# Patient Record
Sex: FEMALE | Race: BLACK OR AFRICAN AMERICAN | NOT HISPANIC OR LATINO | Employment: OTHER | URBAN - METROPOLITAN AREA
[De-identification: names, ages, dates, MRNs, and addresses within clinical notes are randomized per-mention and may not be internally consistent; named-entity substitution may affect disease eponyms.]

---

## 2017-03-02 ENCOUNTER — HISTORICAL (OUTPATIENT)
Dept: ADMINISTRATIVE | Facility: HOSPITAL | Age: 77
End: 2017-03-02

## 2017-08-07 ENCOUNTER — HISTORICAL (OUTPATIENT)
Dept: ADMINISTRATIVE | Facility: HOSPITAL | Age: 77
End: 2017-08-07

## 2017-08-07 LAB
ABS NEUT (OLG): 3.08 X10(3)/MCL (ref 2.1–9.2)
ALBUMIN SERPL-MCNC: 4 GM/DL (ref 3.4–5)
ALBUMIN/GLOB SERPL: 1 RATIO (ref 1–2)
ALP SERPL-CCNC: 96 UNIT/L (ref 45–117)
ALT SERPL-CCNC: 16 UNIT/L (ref 12–78)
AST SERPL-CCNC: 15 UNIT/L (ref 15–37)
BASOPHILS # BLD AUTO: 0.06 X10(3)/MCL
BASOPHILS NFR BLD AUTO: 1 % (ref 0–1)
BILIRUB SERPL-MCNC: 0.8 MG/DL (ref 0.2–1)
BILIRUBIN DIRECT+TOT PNL SERPL-MCNC: 0.2 MG/DL
BILIRUBIN DIRECT+TOT PNL SERPL-MCNC: 0.6 MG/DL
BUN SERPL-MCNC: 18 MG/DL (ref 7–18)
CALCIUM SERPL-MCNC: 9.6 MG/DL (ref 8.5–10.1)
CHLORIDE SERPL-SCNC: 106 MMOL/L (ref 98–107)
CO2 SERPL-SCNC: 27 MMOL/L (ref 21–32)
CREAT SERPL-MCNC: 1.1 MG/DL (ref 0.6–1.3)
EOSINOPHIL # BLD AUTO: 0.15 X10(3)/MCL
EOSINOPHIL NFR BLD AUTO: 3 % (ref 0–5)
ERYTHROCYTE [DISTWIDTH] IN BLOOD BY AUTOMATED COUNT: 11.8 % (ref 11.5–14.5)
GLOBULIN SER-MCNC: 4.3 GM/ML (ref 2.3–3.5)
GLUCOSE SERPL-MCNC: 129 MG/DL (ref 74–106)
HBV SURFACE AG SERPL QL IA: POSITIVE
HCT VFR BLD AUTO: 38.5 % (ref 35–46)
HGB BLD-MCNC: 12.6 GM/DL (ref 12–16)
IMM GRANULOCYTES # BLD AUTO: 0.02 10*3/UL
IMM GRANULOCYTES NFR BLD AUTO: 0 %
LYMPHOCYTES # BLD AUTO: 1.8 X10(3)/MCL
LYMPHOCYTES NFR BLD AUTO: 32 % (ref 15–40)
MCH RBC QN AUTO: 28.9 PG (ref 26–34)
MCHC RBC AUTO-ENTMCNC: 32.7 GM/DL (ref 31–37)
MCV RBC AUTO: 88.3 FL (ref 80–100)
MONOCYTES # BLD AUTO: 0.47 X10(3)/MCL
MONOCYTES NFR BLD AUTO: 8 % (ref 4–12)
NEUTROPHILS # BLD AUTO: 3.08 X10(3)/MCL
NEUTROPHILS NFR BLD AUTO: 55 X10(3)/MCL
PLATELET # BLD AUTO: 175 X10(3)/MCL (ref 130–400)
PMV BLD AUTO: 13.5 FL (ref 7.4–10.4)
POTASSIUM SERPL-SCNC: 4.5 MMOL/L (ref 3.5–5.1)
PROT SERPL-MCNC: 8.3 GM/DL (ref 6.4–8.2)
RBC # BLD AUTO: 4.36 X10(6)/MCL (ref 4–5.2)
SODIUM SERPL-SCNC: 141 MMOL/L (ref 136–145)
WBC # SPEC AUTO: 5.6 X10(3)/MCL (ref 4.5–11)

## 2017-09-18 ENCOUNTER — HISTORICAL (OUTPATIENT)
Dept: RADIOLOGY | Facility: HOSPITAL | Age: 77
End: 2017-09-18

## 2018-01-31 ENCOUNTER — HISTORICAL (OUTPATIENT)
Dept: INTERNAL MEDICINE | Facility: CLINIC | Age: 78
End: 2018-01-31

## 2018-02-09 LAB
COLOR STL: NORMAL
CONSISTENCY STL: NORMAL
HEMOCCULT SP1 STL QL: NEGATIVE

## 2018-02-11 LAB
COLOR STL: NORMAL
CONSISTENCY STL: NORMAL
HEMOCCULT SP2 STL QL: NEGATIVE

## 2018-02-12 ENCOUNTER — HISTORICAL (OUTPATIENT)
Dept: INTERNAL MEDICINE | Facility: CLINIC | Age: 78
End: 2018-02-12

## 2018-02-12 ENCOUNTER — HISTORICAL (OUTPATIENT)
Dept: ADMINISTRATIVE | Facility: HOSPITAL | Age: 78
End: 2018-02-12

## 2018-02-12 LAB
ABS NEUT (OLG): 3.15 X10(3)/MCL (ref 2.1–9.2)
ALBUMIN SERPL-MCNC: 3.8 GM/DL (ref 3.4–5)
ALBUMIN/GLOB SERPL: 1 RATIO (ref 1–2)
ALP SERPL-CCNC: 89 UNIT/L (ref 45–117)
ALT SERPL-CCNC: 14 UNIT/L (ref 12–78)
AST SERPL-CCNC: 16 UNIT/L (ref 15–37)
BASOPHILS # BLD AUTO: 0.04 X10(3)/MCL
BASOPHILS NFR BLD AUTO: 1 % (ref 0–1)
BILIRUB SERPL-MCNC: 0.8 MG/DL (ref 0.2–1)
BILIRUBIN DIRECT+TOT PNL SERPL-MCNC: 0.2 MG/DL
BILIRUBIN DIRECT+TOT PNL SERPL-MCNC: 0.6 MG/DL
BUN SERPL-MCNC: 17 MG/DL (ref 7–18)
CALCIUM SERPL-MCNC: 9 MG/DL (ref 8.5–10.1)
CHLORIDE SERPL-SCNC: 107 MMOL/L (ref 98–107)
CO2 SERPL-SCNC: 28 MMOL/L (ref 21–32)
COLOR STL: NORMAL
CONSISTENCY STL: NORMAL
CREAT SERPL-MCNC: 1 MG/DL (ref 0.6–1.3)
EOSINOPHIL # BLD AUTO: 0.17 X10(3)/MCL
EOSINOPHIL NFR BLD AUTO: 3 % (ref 0–5)
ERYTHROCYTE [DISTWIDTH] IN BLOOD BY AUTOMATED COUNT: 12 % (ref 11.5–14.5)
GLOBULIN SER-MCNC: 4.4 GM/ML (ref 2.3–3.5)
GLUCOSE SERPL-MCNC: 129 MG/DL (ref 74–106)
HBV SURFACE AG SERPL QL IA: POSITIVE
HCT VFR BLD AUTO: 37.6 % (ref 35–46)
HGB BLD-MCNC: 12.7 GM/DL (ref 12–16)
IMM GRANULOCYTES # BLD AUTO: 0.01 10*3/UL
IMM GRANULOCYTES NFR BLD AUTO: 0 %
LYMPHOCYTES # BLD AUTO: 1.4 X10(3)/MCL
LYMPHOCYTES NFR BLD AUTO: 27 % (ref 15–40)
MCH RBC QN AUTO: 30.2 PG (ref 26–34)
MCHC RBC AUTO-ENTMCNC: 33.8 GM/DL (ref 31–37)
MCV RBC AUTO: 89.5 FL (ref 80–100)
MONOCYTES # BLD AUTO: 0.41 X10(3)/MCL
MONOCYTES NFR BLD AUTO: 8 % (ref 4–12)
NEUTROPHILS # BLD AUTO: 3.15 X10(3)/MCL
NEUTROPHILS NFR BLD AUTO: 61 X10(3)/MCL
PLATELET # BLD AUTO: 150 X10(3)/MCL (ref 130–400)
PMV BLD AUTO: 13 FL (ref 7.4–10.4)
POTASSIUM SERPL-SCNC: 4.3 MMOL/L (ref 3.5–5.1)
PROT SERPL-MCNC: 8.2 GM/DL (ref 6.4–8.2)
RBC # BLD AUTO: 4.2 X10(6)/MCL (ref 4–5.2)
SODIUM SERPL-SCNC: 141 MMOL/L (ref 136–145)
WBC # SPEC AUTO: 5.2 X10(3)/MCL (ref 4.5–11)

## 2018-03-29 ENCOUNTER — HISTORICAL (OUTPATIENT)
Dept: ADMINISTRATIVE | Facility: HOSPITAL | Age: 78
End: 2018-03-29

## 2018-08-13 ENCOUNTER — HISTORICAL (OUTPATIENT)
Dept: ADMINISTRATIVE | Facility: HOSPITAL | Age: 78
End: 2018-08-13

## 2018-08-13 LAB
ABS NEUT (OLG): 3.53 X10(3)/MCL (ref 2.1–9.2)
ALBUMIN SERPL-MCNC: 3.7 GM/DL (ref 3.4–5)
ALBUMIN/GLOB SERPL: 1 RATIO (ref 1–2)
ALP SERPL-CCNC: 95 UNIT/L (ref 45–117)
ALT SERPL-CCNC: 15 UNIT/L (ref 12–78)
AST SERPL-CCNC: 17 UNIT/L (ref 15–37)
BASOPHILS # BLD AUTO: 0.03 X10(3)/MCL
BASOPHILS NFR BLD AUTO: 0 %
BILIRUB SERPL-MCNC: 0.6 MG/DL (ref 0.2–1)
BILIRUBIN DIRECT+TOT PNL SERPL-MCNC: 0.2 MG/DL
BILIRUBIN DIRECT+TOT PNL SERPL-MCNC: 0.4 MG/DL
BUN SERPL-MCNC: 13 MG/DL (ref 7–18)
CALCIUM SERPL-MCNC: 8.7 MG/DL (ref 8.5–10.1)
CHLORIDE SERPL-SCNC: 106 MMOL/L (ref 98–107)
CO2 SERPL-SCNC: 27 MMOL/L (ref 21–32)
CREAT SERPL-MCNC: 1.2 MG/DL (ref 0.6–1.3)
EOSINOPHIL # BLD AUTO: 0.15 X10(3)/MCL
EOSINOPHIL NFR BLD AUTO: 3 %
ERYTHROCYTE [DISTWIDTH] IN BLOOD BY AUTOMATED COUNT: 11.8 % (ref 11.5–14.5)
GLOBULIN SER-MCNC: 3.8 GM/ML (ref 2.3–3.5)
GLUCOSE SERPL-MCNC: 183 MG/DL (ref 74–106)
HBV SURFACE AG SERPL QL IA: POSITIVE
HCT VFR BLD AUTO: 36.7 % (ref 35–46)
HCV AB SERPL QL IA: NONREACTIVE
HGB BLD-MCNC: 12 GM/DL (ref 12–16)
HIV 1+2 AB+HIV1 P24 AG SERPL QL IA: NONREACTIVE
IMM GRANULOCYTES # BLD AUTO: 0.02 10*3/UL
IMM GRANULOCYTES NFR BLD AUTO: 0 %
INR PPP: 1.19 (ref 0.9–1.2)
LYMPHOCYTES # BLD AUTO: 1.45 X10(3)/MCL
LYMPHOCYTES NFR BLD AUTO: 26 % (ref 13–40)
MCH RBC QN AUTO: 29.6 PG (ref 26–34)
MCHC RBC AUTO-ENTMCNC: 32.7 GM/DL (ref 31–37)
MCV RBC AUTO: 90.6 FL (ref 80–100)
MONOCYTES # BLD AUTO: 0.44 X10(3)/MCL
MONOCYTES NFR BLD AUTO: 8 % (ref 4–12)
NEUTROPHILS # BLD AUTO: 3.53 X10(3)/MCL
NEUTROPHILS NFR BLD AUTO: 63 X10(3)/MCL
PLATELET # BLD AUTO: 159 X10(3)/MCL (ref 130–400)
PMV BLD AUTO: 13.2 FL (ref 7.4–10.4)
POTASSIUM SERPL-SCNC: 4.2 MMOL/L (ref 3.5–5.1)
PROT SERPL-MCNC: 7.5 GM/DL (ref 6.4–8.2)
PROTHROMBIN TIME: 14.3 SECOND(S) (ref 11.9–14.4)
RBC # BLD AUTO: 4.05 X10(6)/MCL (ref 4–5.2)
RPR SER QL: REACTIVE
SODIUM SERPL-SCNC: 140 MMOL/L (ref 136–145)
T PALLIDUM AB SER QL: REACTIVE
WBC # SPEC AUTO: 5.6 X10(3)/MCL (ref 4.5–11)

## 2018-10-05 ENCOUNTER — HISTORICAL (OUTPATIENT)
Dept: RADIOLOGY | Facility: HOSPITAL | Age: 78
End: 2018-10-05

## 2018-10-05 LAB
CHOLEST SERPL-MCNC: 145 MG/DL
CHOLEST/HDLC SERPL: 4.4 {RATIO} (ref 0–4.4)
DEPRECATED CALCIDIOL+CALCIFEROL SERPL-MC: 7.26 NG/ML (ref 30–80)
EST. AVERAGE GLUCOSE BLD GHB EST-MCNC: 143 MG/DL
HBA1C MFR BLD: 6.6 % (ref 4.2–6.3)
HDLC SERPL-MCNC: 33 MG/DL
LDLC SERPL CALC-MCNC: 89 MG/DL (ref 0–130)
TRIGL SERPL-MCNC: 114 MG/DL
VLDLC SERPL CALC-MCNC: 23 MG/DL

## 2018-11-05 ENCOUNTER — HISTORICAL (OUTPATIENT)
Dept: RADIOLOGY | Facility: HOSPITAL | Age: 78
End: 2018-11-05

## 2019-02-13 ENCOUNTER — HISTORICAL (OUTPATIENT)
Dept: ADMINISTRATIVE | Facility: HOSPITAL | Age: 79
End: 2019-02-13

## 2019-02-13 LAB
ABS NEUT (OLG): 3.8 X10(3)/MCL (ref 2.1–9.2)
ALBUMIN SERPL-MCNC: 3.9 GM/DL (ref 3.4–5)
ALBUMIN/GLOB SERPL: 0.9 RATIO (ref 1.1–2)
ALP SERPL-CCNC: 97 UNIT/L (ref 45–117)
ALT SERPL-CCNC: 16 UNIT/L (ref 12–78)
AST SERPL-CCNC: 13 UNIT/L (ref 15–37)
BASOPHILS # BLD AUTO: 0.04 X10(3)/MCL
BASOPHILS NFR BLD AUTO: 1 %
BILIRUB SERPL-MCNC: 0.9 MG/DL (ref 0.2–1)
BILIRUBIN DIRECT+TOT PNL SERPL-MCNC: 0.2 MG/DL
BILIRUBIN DIRECT+TOT PNL SERPL-MCNC: 0.7 MG/DL
BUN SERPL-MCNC: 20 MG/DL (ref 7–18)
CALCIUM SERPL-MCNC: 8.9 MG/DL (ref 8.5–10.1)
CHLORIDE SERPL-SCNC: 106 MMOL/L (ref 98–107)
CO2 SERPL-SCNC: 27 MMOL/L (ref 21–32)
CREAT SERPL-MCNC: 1.1 MG/DL (ref 0.6–1.3)
EOSINOPHIL # BLD AUTO: 0.11 X10(3)/MCL
EOSINOPHIL NFR BLD AUTO: 2 %
ERYTHROCYTE [DISTWIDTH] IN BLOOD BY AUTOMATED COUNT: 11.9 % (ref 11.5–14.5)
GLOBULIN SER-MCNC: 4.4 GM/ML (ref 2.3–3.5)
GLUCOSE SERPL-MCNC: 210 MG/DL (ref 74–106)
HBV SURFACE AG SERPL QL IA: POSITIVE
HCT VFR BLD AUTO: 39.9 % (ref 35–46)
HGB BLD-MCNC: 13 GM/DL (ref 12–16)
IMM GRANULOCYTES # BLD AUTO: 0.02 10*3/UL
IMM GRANULOCYTES NFR BLD AUTO: 0 %
LYMPHOCYTES # BLD AUTO: 1.45 X10(3)/MCL
LYMPHOCYTES NFR BLD AUTO: 25 % (ref 13–40)
MCH RBC QN AUTO: 29.6 PG (ref 26–34)
MCHC RBC AUTO-ENTMCNC: 32.6 GM/DL (ref 31–37)
MCV RBC AUTO: 90.9 FL (ref 80–100)
MONOCYTES # BLD AUTO: 0.46 X10(3)/MCL
MONOCYTES NFR BLD AUTO: 8 % (ref 4–12)
NEUTROPHILS # BLD AUTO: 3.8 X10(3)/MCL
NEUTROPHILS NFR BLD AUTO: 65 X10(3)/MCL
PLATELET # BLD AUTO: 171 X10(3)/MCL (ref 130–400)
PMV BLD AUTO: 13.6 FL (ref 7.4–10.4)
POTASSIUM SERPL-SCNC: 4.2 MMOL/L (ref 3.5–5.1)
PROT SERPL-MCNC: 8.3 GM/DL (ref 6.4–8.2)
RBC # BLD AUTO: 4.39 X10(6)/MCL (ref 4–5.2)
SODIUM SERPL-SCNC: 140 MMOL/L (ref 136–145)
WBC # SPEC AUTO: 5.9 X10(3)/MCL (ref 4.5–11)

## 2019-10-24 ENCOUNTER — HISTORICAL (OUTPATIENT)
Dept: ADMINISTRATIVE | Facility: HOSPITAL | Age: 79
End: 2019-10-24

## 2019-10-24 LAB
ABS NEUT (OLG): 3.83 X10(3)/MCL (ref 2.1–9.2)
ALBUMIN SERPL-MCNC: 3.8 GM/DL (ref 3.4–5)
ALBUMIN/GLOB SERPL: 0.8 RATIO (ref 1.1–2)
ALP SERPL-CCNC: 101 UNIT/L (ref 45–117)
ALT SERPL-CCNC: 16 UNIT/L (ref 12–78)
AST SERPL-CCNC: 14 UNIT/L (ref 15–37)
BASOPHILS # BLD AUTO: 0 X10(3)/MCL (ref 0–0.2)
BASOPHILS NFR BLD AUTO: 1 %
BILIRUB SERPL-MCNC: 0.7 MG/DL (ref 0.2–1)
BILIRUBIN DIRECT+TOT PNL SERPL-MCNC: 0.2 MG/DL (ref 0–0.2)
BILIRUBIN DIRECT+TOT PNL SERPL-MCNC: 0.5 MG/DL
BUN SERPL-MCNC: 18 MG/DL (ref 7–18)
CALCIUM SERPL-MCNC: 8.7 MG/DL (ref 8.5–10.1)
CHLORIDE SERPL-SCNC: 108 MMOL/L (ref 98–107)
CO2 SERPL-SCNC: 27 MMOL/L (ref 21–32)
CREAT SERPL-MCNC: 1.1 MG/DL (ref 0.6–1.3)
EOSINOPHIL # BLD AUTO: 0.1 X10(3)/MCL (ref 0–0.9)
EOSINOPHIL NFR BLD AUTO: 2 %
ERYTHROCYTE [DISTWIDTH] IN BLOOD BY AUTOMATED COUNT: 11.8 % (ref 11.5–14.5)
GLOBULIN SER-MCNC: 4.5 GM/ML (ref 2.3–3.5)
GLUCOSE SERPL-MCNC: 114 MG/DL (ref 74–106)
HCT VFR BLD AUTO: 39.1 % (ref 35–46)
HGB BLD-MCNC: 12.7 GM/DL (ref 12–16)
IMM GRANULOCYTES # BLD AUTO: 0.01 10*3/UL
IMM GRANULOCYTES NFR BLD AUTO: 0 %
LYMPHOCYTES # BLD AUTO: 1.7 X10(3)/MCL (ref 0.6–4.6)
LYMPHOCYTES NFR BLD AUTO: 27 %
MCH RBC QN AUTO: 30 PG (ref 26–34)
MCHC RBC AUTO-ENTMCNC: 32.5 GM/DL (ref 31–37)
MCV RBC AUTO: 92.4 FL (ref 80–100)
MONOCYTES # BLD AUTO: 0.6 X10(3)/MCL (ref 0.1–1.3)
MONOCYTES NFR BLD AUTO: 10 %
NEUTROPHILS # BLD AUTO: 3.83 X10(3)/MCL (ref 2.1–9.2)
NEUTROPHILS NFR BLD AUTO: 60 %
PLATELET # BLD AUTO: 130 X10(3)/MCL (ref 130–400)
PMV BLD AUTO: 14.3 FL (ref 7.4–10.4)
POTASSIUM SERPL-SCNC: 4 MMOL/L (ref 3.5–5.1)
PROT SERPL-MCNC: 8.3 GM/DL (ref 6.4–8.2)
RBC # BLD AUTO: 4.23 X10(6)/MCL (ref 4–5.2)
SODIUM SERPL-SCNC: 140 MMOL/L (ref 136–145)
WBC # SPEC AUTO: 6.3 X10(3)/MCL (ref 4.5–11)

## 2020-08-10 ENCOUNTER — HISTORICAL (OUTPATIENT)
Dept: ADMINISTRATIVE | Facility: HOSPITAL | Age: 80
End: 2020-08-10

## 2020-08-10 LAB
ABS NEUT (OLG): 3.63 X10(3)/MCL (ref 2.1–9.2)
ALBUMIN SERPL-MCNC: 3.6 GM/DL (ref 3.4–5)
ALBUMIN/GLOB SERPL: 0.9 RATIO (ref 1.1–2)
ALP SERPL-CCNC: 99 UNIT/L (ref 45–117)
ALT SERPL-CCNC: 15 UNIT/L (ref 12–78)
AST SERPL-CCNC: 10 UNIT/L (ref 15–37)
BASOPHILS # BLD AUTO: 0 X10(3)/MCL (ref 0–0.2)
BASOPHILS NFR BLD AUTO: 0 %
BILIRUB SERPL-MCNC: 0.7 MG/DL (ref 0.2–1)
BILIRUBIN DIRECT+TOT PNL SERPL-MCNC: 0.2 MG/DL (ref 0–0.2)
BILIRUBIN DIRECT+TOT PNL SERPL-MCNC: 0.5 MG/DL
BUN SERPL-MCNC: 22 MG/DL (ref 7–18)
CALCIUM SERPL-MCNC: 8.9 MG/DL (ref 8.5–10.1)
CHLORIDE SERPL-SCNC: 109 MMOL/L (ref 98–107)
CO2 SERPL-SCNC: 23 MMOL/L (ref 21–32)
CREAT SERPL-MCNC: 1.2 MG/DL (ref 0.6–1.3)
EOSINOPHIL # BLD AUTO: 0.1 X10(3)/MCL (ref 0–0.9)
EOSINOPHIL NFR BLD AUTO: 1 %
ERYTHROCYTE [DISTWIDTH] IN BLOOD BY AUTOMATED COUNT: 11.6 % (ref 11.5–14.5)
GLOBULIN SER-MCNC: 4.1 GM/ML (ref 2.3–3.5)
GLUCOSE SERPL-MCNC: 332 MG/DL (ref 74–106)
HCT VFR BLD AUTO: 36.1 % (ref 35–46)
HGB BLD-MCNC: 12.1 GM/DL (ref 12–16)
HIV 1+2 AB+HIV1 P24 AG SERPL QL IA: NONREACTIVE
IMM GRANULOCYTES # BLD AUTO: 0.01 10*3/UL
IMM GRANULOCYTES NFR BLD AUTO: 0 %
INR PPP: 1.18 (ref 0.9–1.2)
LYMPHOCYTES # BLD AUTO: 1.3 X10(3)/MCL (ref 0.6–4.6)
LYMPHOCYTES NFR BLD AUTO: 24 %
MCH RBC QN AUTO: 29.4 PG (ref 26–34)
MCHC RBC AUTO-ENTMCNC: 33.5 GM/DL (ref 31–37)
MCV RBC AUTO: 87.8 FL (ref 80–100)
MONOCYTES # BLD AUTO: 0.5 X10(3)/MCL (ref 0.1–1.3)
MONOCYTES NFR BLD AUTO: 8 %
NEUTROPHILS # BLD AUTO: 3.63 X10(3)/MCL (ref 2.1–9.2)
NEUTROPHILS NFR BLD AUTO: 66 %
PLATELET # BLD AUTO: 137 X10(3)/MCL (ref 130–400)
PMV BLD AUTO: 13.4 FL (ref 7.4–10.4)
POTASSIUM SERPL-SCNC: 4.3 MMOL/L (ref 3.5–5.1)
PROT SERPL-MCNC: 7.7 GM/DL (ref 6.4–8.2)
PROTHROMBIN TIME: 14.6 SECOND(S) (ref 11.9–14.4)
RBC # BLD AUTO: 4.11 X10(6)/MCL (ref 4–5.2)
RPR SER QL: REACTIVE
SODIUM SERPL-SCNC: 138 MMOL/L (ref 136–145)
T PALLIDUM AB SER QL: REACTIVE
WBC # SPEC AUTO: 5.5 X10(3)/MCL (ref 4.5–11)

## 2020-08-17 ENCOUNTER — HISTORICAL (OUTPATIENT)
Dept: ADMINISTRATIVE | Facility: HOSPITAL | Age: 80
End: 2020-08-17

## 2020-08-20 LAB — FINAL CULTURE: NORMAL

## 2021-03-19 ENCOUNTER — HISTORICAL (OUTPATIENT)
Dept: ADMINISTRATIVE | Facility: HOSPITAL | Age: 81
End: 2021-03-19

## 2021-03-19 LAB
ABS NEUT (OLG): 4.72 X10(3)/MCL (ref 2.1–9.2)
ALBUMIN SERPL-MCNC: 3.9 GM/DL (ref 3.4–4.8)
ALBUMIN/GLOB SERPL: 1 RATIO (ref 1.1–2)
ALP SERPL-CCNC: 62 UNIT/L (ref 40–150)
ALT SERPL-CCNC: 11 UNIT/L (ref 0–55)
AST SERPL-CCNC: 16 UNIT/L (ref 5–34)
BASOPHILS # BLD AUTO: 0 X10(3)/MCL (ref 0–0.2)
BASOPHILS NFR BLD AUTO: 0 %
BILIRUB SERPL-MCNC: 0.6 MG/DL
BILIRUBIN DIRECT+TOT PNL SERPL-MCNC: 0.3 MG/DL (ref 0–0.5)
BILIRUBIN DIRECT+TOT PNL SERPL-MCNC: 0.3 MG/DL (ref 0–0.8)
BUN SERPL-MCNC: 23.8 MG/DL (ref 9.8–20.1)
CALCIUM SERPL-MCNC: 9.1 MG/DL (ref 8.4–10.2)
CHLORIDE SERPL-SCNC: 108 MMOL/L (ref 98–107)
CO2 SERPL-SCNC: 26 MMOL/L (ref 23–31)
CREAT SERPL-MCNC: 1.24 MG/DL (ref 0.55–1.02)
EOSINOPHIL # BLD AUTO: 0.1 X10(3)/MCL (ref 0–0.9)
EOSINOPHIL NFR BLD AUTO: 2 %
ERYTHROCYTE [DISTWIDTH] IN BLOOD BY AUTOMATED COUNT: 11.9 % (ref 11.5–14.5)
GLOBULIN SER-MCNC: 3.9 GM/DL (ref 2.4–3.5)
GLUCOSE SERPL-MCNC: 86 MG/DL (ref 82–115)
HCT VFR BLD AUTO: 36.1 % (ref 35–46)
HGB BLD-MCNC: 11.5 GM/DL (ref 12–16)
IMM GRANULOCYTES # BLD AUTO: 0.02 10*3/UL
IMM GRANULOCYTES NFR BLD AUTO: 0 %
INR PPP: 1.15 (ref 0.9–1.2)
LYMPHOCYTES # BLD AUTO: 1.6 X10(3)/MCL (ref 0.6–4.6)
LYMPHOCYTES NFR BLD AUTO: 22 %
MCH RBC QN AUTO: 29.5 PG (ref 26–34)
MCHC RBC AUTO-ENTMCNC: 31.9 GM/DL (ref 31–37)
MCV RBC AUTO: 92.6 FL (ref 80–100)
MONOCYTES # BLD AUTO: 0.7 X10(3)/MCL (ref 0.1–1.3)
MONOCYTES NFR BLD AUTO: 10 %
NEUTROPHILS # BLD AUTO: 4.72 X10(3)/MCL (ref 2.1–9.2)
NEUTROPHILS NFR BLD AUTO: 66 %
PLATELET # BLD AUTO: 171 X10(3)/MCL (ref 130–400)
PMV BLD AUTO: 12.7 FL (ref 7.4–10.4)
POTASSIUM SERPL-SCNC: 4.6 MMOL/L (ref 3.5–5.1)
PROT SERPL-MCNC: 7.8 GM/DL (ref 5.8–7.6)
PROTHROMBIN TIME: 14.3 SECOND(S) (ref 11.9–14.4)
RBC # BLD AUTO: 3.9 X10(6)/MCL (ref 4–5.2)
SODIUM SERPL-SCNC: 140 MMOL/L (ref 136–145)
WBC # SPEC AUTO: 7.2 X10(3)/MCL (ref 4.5–11)

## 2021-07-28 ENCOUNTER — HISTORICAL (OUTPATIENT)
Dept: ADMINISTRATIVE | Facility: HOSPITAL | Age: 81
End: 2021-07-28

## 2021-07-28 LAB
ABS NEUT (OLG): 4.38 X10(3)/MCL (ref 2.1–9.2)
ALBUMIN SERPL-MCNC: 3.9 GM/DL (ref 3.4–4.8)
ALBUMIN/GLOB SERPL: 1.1 RATIO (ref 1.1–2)
ALP SERPL-CCNC: 57 UNIT/L (ref 40–150)
ALT SERPL-CCNC: 6 UNIT/L (ref 0–55)
AST SERPL-CCNC: 16 UNIT/L (ref 5–34)
BASOPHILS # BLD AUTO: 0 X10(3)/MCL (ref 0–0.2)
BASOPHILS NFR BLD AUTO: 0 %
BILIRUB SERPL-MCNC: 0.5 MG/DL
BILIRUBIN DIRECT+TOT PNL SERPL-MCNC: 0.2 MG/DL (ref 0–0.5)
BILIRUBIN DIRECT+TOT PNL SERPL-MCNC: 0.3 MG/DL (ref 0–0.8)
BUN SERPL-MCNC: 21.6 MG/DL (ref 9.8–20.1)
CALCIUM SERPL-MCNC: 9.6 MG/DL (ref 8.4–10.2)
CHLORIDE SERPL-SCNC: 105 MMOL/L (ref 98–107)
CO2 SERPL-SCNC: 25 MMOL/L (ref 23–31)
CREAT SERPL-MCNC: 1.2 MG/DL (ref 0.55–1.02)
EOSINOPHIL # BLD AUTO: 0.2 X10(3)/MCL (ref 0–0.9)
EOSINOPHIL NFR BLD AUTO: 3 %
ERYTHROCYTE [DISTWIDTH] IN BLOOD BY AUTOMATED COUNT: 12.2 % (ref 11.5–14.5)
GLOBULIN SER-MCNC: 3.5 GM/DL (ref 2.4–3.5)
GLUCOSE SERPL-MCNC: 138 MG/DL (ref 82–115)
HCT VFR BLD AUTO: 33.9 % (ref 35–46)
HGB BLD-MCNC: 10.9 GM/DL (ref 12–16)
IMM GRANULOCYTES # BLD AUTO: 0.03 10*3/UL
IMM GRANULOCYTES NFR BLD AUTO: 0 %
INR PPP: 1.12 (ref 0.9–1.2)
LYMPHOCYTES # BLD AUTO: 1.2 X10(3)/MCL (ref 0.6–4.6)
LYMPHOCYTES NFR BLD AUTO: 19 %
MCH RBC QN AUTO: 29.3 PG (ref 26–34)
MCHC RBC AUTO-ENTMCNC: 32.2 GM/DL (ref 31–37)
MCV RBC AUTO: 91.1 FL (ref 80–100)
MONOCYTES # BLD AUTO: 0.5 X10(3)/MCL (ref 0.1–1.3)
MONOCYTES NFR BLD AUTO: 8 %
NEUTROPHILS # BLD AUTO: 4.38 X10(3)/MCL (ref 2.1–9.2)
NEUTROPHILS NFR BLD AUTO: 70 %
NRBC BLD AUTO-RTO: 0 % (ref 0–0.2)
PLATELET # BLD AUTO: 175 X10(3)/MCL (ref 130–400)
PMV BLD AUTO: 13.1 FL (ref 7.4–10.4)
POTASSIUM SERPL-SCNC: 4.6 MMOL/L (ref 3.5–5.1)
PROT SERPL-MCNC: 7.4 GM/DL (ref 5.8–7.6)
PROTHROMBIN TIME: 14.2 SECOND(S) (ref 11.9–14.4)
RBC # BLD AUTO: 3.72 X10(6)/MCL (ref 4–5.2)
SODIUM SERPL-SCNC: 137 MMOL/L (ref 136–145)
WBC # SPEC AUTO: 6.3 X10(3)/MCL (ref 4.5–11)

## 2021-10-22 ENCOUNTER — HISTORICAL (OUTPATIENT)
Dept: RADIOLOGY | Facility: HOSPITAL | Age: 81
End: 2021-10-22

## 2021-11-04 ENCOUNTER — HISTORICAL (OUTPATIENT)
Dept: ADMINISTRATIVE | Facility: HOSPITAL | Age: 81
End: 2021-11-04

## 2021-11-04 LAB
ABS NEUT (OLG): 3.42 X10(3)/MCL (ref 2.1–9.2)
ALBUMIN SERPL-MCNC: 4.1 GM/DL (ref 3.4–4.8)
ALBUMIN/GLOB SERPL: 1.1 RATIO (ref 1.1–2)
ALP SERPL-CCNC: 67 UNIT/L (ref 40–150)
ALT SERPL-CCNC: 9 UNIT/L (ref 0–55)
AST SERPL-CCNC: 14 UNIT/L (ref 5–34)
BASOPHILS # BLD AUTO: 0 X10(3)/MCL (ref 0–0.2)
BASOPHILS NFR BLD AUTO: 1 %
BILIRUB SERPL-MCNC: 0.7 MG/DL
BILIRUBIN DIRECT+TOT PNL SERPL-MCNC: 0.3 MG/DL (ref 0–0.5)
BILIRUBIN DIRECT+TOT PNL SERPL-MCNC: 0.4 MG/DL (ref 0–0.8)
BUN SERPL-MCNC: 13.2 MG/DL (ref 9.8–20.1)
CALCIUM SERPL-MCNC: 9.7 MG/DL (ref 8.7–10.5)
CHLORIDE SERPL-SCNC: 108 MMOL/L (ref 98–107)
CO2 SERPL-SCNC: 26 MMOL/L (ref 23–31)
CREAT SERPL-MCNC: 1.05 MG/DL (ref 0.55–1.02)
EOSINOPHIL # BLD AUTO: 0.2 X10(3)/MCL (ref 0–0.9)
EOSINOPHIL NFR BLD AUTO: 3 %
ERYTHROCYTE [DISTWIDTH] IN BLOOD BY AUTOMATED COUNT: 12.7 % (ref 11.5–14.5)
GLOBULIN SER-MCNC: 3.9 GM/DL (ref 2.4–3.5)
GLUCOSE SERPL-MCNC: 110 MG/DL (ref 82–115)
HCT VFR BLD AUTO: 36.5 % (ref 35–46)
HGB BLD-MCNC: 12 GM/DL (ref 12–16)
IMM GRANULOCYTES # BLD AUTO: 0.02 10*3/UL
IMM GRANULOCYTES NFR BLD AUTO: 0 %
INR PPP: 1.15 (ref 0.9–1.2)
LYMPHOCYTES # BLD AUTO: 1.1 X10(3)/MCL (ref 0.6–4.6)
LYMPHOCYTES NFR BLD AUTO: 21 %
MCH RBC QN AUTO: 29.7 PG (ref 26–34)
MCHC RBC AUTO-ENTMCNC: 32.9 GM/DL (ref 31–37)
MCV RBC AUTO: 90.3 FL (ref 80–100)
MONOCYTES # BLD AUTO: 0.5 X10(3)/MCL (ref 0.1–1.3)
MONOCYTES NFR BLD AUTO: 9 %
NEUTROPHILS # BLD AUTO: 3.42 X10(3)/MCL (ref 2.1–9.2)
NEUTROPHILS NFR BLD AUTO: 65 %
NRBC BLD AUTO-RTO: 0 % (ref 0–0.2)
PLATELET # BLD AUTO: 152 X10(3)/MCL (ref 130–400)
PMV BLD AUTO: 13.5 FL (ref 7.4–10.4)
POTASSIUM SERPL-SCNC: 4.4 MMOL/L (ref 3.5–5.1)
PROT SERPL-MCNC: 8 GM/DL (ref 5.8–7.6)
PROTHROMBIN TIME: 14.5 SECOND(S) (ref 11.9–14.4)
RBC # BLD AUTO: 4.04 X10(6)/MCL (ref 4–5.2)
SODIUM SERPL-SCNC: 141 MMOL/L (ref 136–145)
WBC # SPEC AUTO: 5.2 X10(3)/MCL (ref 4.5–11)

## 2022-04-11 ENCOUNTER — HISTORICAL (OUTPATIENT)
Dept: ADMINISTRATIVE | Facility: HOSPITAL | Age: 82
End: 2022-04-11
Payer: MEDICARE

## 2022-04-29 VITALS
SYSTOLIC BLOOD PRESSURE: 143 MMHG | OXYGEN SATURATION: 98 % | BODY MASS INDEX: 31.34 KG/M2 | DIASTOLIC BLOOD PRESSURE: 79 MMHG | HEIGHT: 61 IN | WEIGHT: 166 LBS

## 2022-07-18 ENCOUNTER — HOSPITAL ENCOUNTER (OUTPATIENT)
Facility: HOSPITAL | Age: 82
Discharge: HOME HEALTH | End: 2022-07-20
Attending: EMERGENCY MEDICINE | Admitting: INTERNAL MEDICINE
Payer: MEDICARE

## 2022-07-18 DIAGNOSIS — I63.9 STROKE: ICD-10-CM

## 2022-07-18 DIAGNOSIS — N17.9 ACUTE KIDNEY INJURY SUPERIMPOSED ON CHRONIC KIDNEY DISEASE: ICD-10-CM

## 2022-07-18 DIAGNOSIS — R41.82 ALTERED MENTAL STATUS: ICD-10-CM

## 2022-07-18 DIAGNOSIS — R47.01 EXPRESSIVE APHASIA: ICD-10-CM

## 2022-07-18 DIAGNOSIS — R41.0 DELIRIUM: Primary | ICD-10-CM

## 2022-07-18 DIAGNOSIS — N18.9 ACUTE KIDNEY INJURY SUPERIMPOSED ON CHRONIC KIDNEY DISEASE: ICD-10-CM

## 2022-07-18 LAB — POCT GLUCOSE: 167 MG/DL (ref 70–110)

## 2022-07-18 PROCEDURE — 99285 EMERGENCY DEPT VISIT HI MDM: CPT | Mod: 25

## 2022-07-18 PROCEDURE — 82962 GLUCOSE BLOOD TEST: CPT

## 2022-07-19 PROBLEM — R47.01 EXPRESSIVE APHASIA: Status: ACTIVE | Noted: 2022-07-19

## 2022-07-19 LAB
ALBUMIN SERPL-MCNC: 3.9 GM/DL (ref 3.4–4.8)
ALBUMIN/GLOB SERPL: 1 RATIO (ref 1.1–2)
ALP SERPL-CCNC: 66 UNIT/L (ref 40–150)
ALT SERPL-CCNC: 9 UNIT/L (ref 0–55)
APPEARANCE UR: CLEAR
AST SERPL-CCNC: 14 UNIT/L (ref 5–34)
BACTERIA #/AREA URNS AUTO: ABNORMAL /HPF
BASOPHILS # BLD AUTO: 0.03 X10(3)/MCL (ref 0–0.2)
BASOPHILS NFR BLD AUTO: 0.3 %
BILIRUB UR QL STRIP.AUTO: NEGATIVE MG/DL
BILIRUBIN DIRECT+TOT PNL SERPL-MCNC: 0.9 MG/DL
BUN SERPL-MCNC: 32 MG/DL (ref 9.8–20.1)
CALCIUM SERPL-MCNC: 9.2 MG/DL (ref 8.4–10.2)
CHLORIDE SERPL-SCNC: 100 MMOL/L (ref 98–107)
CHOLEST SERPL-MCNC: 138 MG/DL
CHOLEST/HDLC SERPL: 5 {RATIO} (ref 0–5)
CO2 SERPL-SCNC: 24 MMOL/L (ref 23–31)
COLOR UR AUTO: YELLOW
CREAT SERPL-MCNC: 1.82 MG/DL (ref 0.55–1.02)
CRP SERPL HS-MCNC: 26.34 MG/L
D DIMER PPP IA.FEU-MCNC: 0.98 UG/ML FEU (ref 0–0.5)
EOSINOPHIL # BLD AUTO: 0.01 X10(3)/MCL (ref 0–0.9)
EOSINOPHIL NFR BLD AUTO: 0.1 %
ERYTHROCYTE [DISTWIDTH] IN BLOOD BY AUTOMATED COUNT: 12.5 % (ref 11.5–17)
ERYTHROCYTE [SEDIMENTATION RATE] IN BLOOD: 14 MM/HR (ref 0–20)
FERRITIN SERPL-MCNC: 166.98 NG/ML (ref 4.63–204)
GLOBULIN SER-MCNC: 4 GM/DL (ref 2.4–3.5)
GLUCOSE SERPL-MCNC: 177 MG/DL (ref 82–115)
GLUCOSE UR QL STRIP.AUTO: NEGATIVE MG/DL
HCT VFR BLD AUTO: 35.2 % (ref 37–47)
HDLC SERPL-MCNC: 26 MG/DL (ref 35–60)
HGB BLD-MCNC: 12 GM/DL (ref 12–16)
IMM GRANULOCYTES # BLD AUTO: 0.06 X10(3)/MCL (ref 0–0.04)
IMM GRANULOCYTES NFR BLD AUTO: 0.5 %
KETONES UR QL STRIP.AUTO: NEGATIVE MG/DL
LDH SERPL-CCNC: 98 U/L (ref 125–220)
LDLC SERPL CALC-MCNC: 89 MG/DL (ref 50–140)
LEUKOCYTE ESTERASE UR QL STRIP.AUTO: ABNORMAL UNIT/L
LYMPHOCYTES # BLD AUTO: 0.56 X10(3)/MCL (ref 0.6–4.6)
LYMPHOCYTES NFR BLD AUTO: 5 %
MCH RBC QN AUTO: 29.9 PG (ref 27–31)
MCHC RBC AUTO-ENTMCNC: 34.1 MG/DL (ref 33–36)
MCV RBC AUTO: 87.6 FL (ref 80–94)
MONOCYTES # BLD AUTO: 0.78 X10(3)/MCL (ref 0.1–1.3)
MONOCYTES NFR BLD AUTO: 6.9 %
NEUTROPHILS # BLD AUTO: 9.8 X10(3)/MCL (ref 2.1–9.2)
NEUTROPHILS NFR BLD AUTO: 87.2 %
NITRITE UR QL STRIP.AUTO: NEGATIVE
NRBC BLD AUTO-RTO: 0 %
PH UR STRIP.AUTO: 5.5 [PH]
PLATELET # BLD AUTO: 169 X10(3)/MCL (ref 130–400)
PMV BLD AUTO: 13.5 FL (ref 7.4–10.4)
POCT GLUCOSE: 133 MG/DL (ref 70–110)
POTASSIUM SERPL-SCNC: 4.1 MMOL/L (ref 3.5–5.1)
PROT SERPL-MCNC: 7.9 GM/DL (ref 5.8–7.6)
PROT UR QL STRIP.AUTO: NEGATIVE MG/DL
RBC # BLD AUTO: 4.02 X10(6)/MCL (ref 4.2–5.4)
RBC #/AREA URNS AUTO: ABNORMAL /HPF
RBC UR QL AUTO: NEGATIVE UNIT/L
SARS-COV-2 RDRP RESP QL NAA+PROBE: POSITIVE
SODIUM SERPL-SCNC: 135 MMOL/L (ref 136–145)
SP GR UR STRIP.AUTO: 1.01 (ref 1–1.03)
SQUAMOUS #/AREA URNS AUTO: ABNORMAL /HPF
TRIGL SERPL-MCNC: 116 MG/DL (ref 37–140)
TROPONIN I SERPL-MCNC: 0.01 NG/ML (ref 0–0.04)
TSH SERPL-ACNC: 1.05 UIU/ML (ref 0.35–4.94)
UROBILINOGEN UR STRIP-ACNC: 1 MG/DL
VLDLC SERPL CALC-MCNC: 23 MG/DL
WBC # SPEC AUTO: 11.2 X10(3)/MCL (ref 4.5–11.5)
WBC #/AREA URNS AUTO: ABNORMAL /HPF

## 2022-07-19 PROCEDURE — G0378 HOSPITAL OBSERVATION PER HR: HCPCS

## 2022-07-19 PROCEDURE — 97166 OT EVAL MOD COMPLEX 45 MIN: CPT

## 2022-07-19 PROCEDURE — 83615 LACTATE (LD) (LDH) ENZYME: CPT | Performed by: NURSE PRACTITIONER

## 2022-07-19 PROCEDURE — 87040 BLOOD CULTURE FOR BACTERIA: CPT | Mod: 59 | Performed by: NURSE PRACTITIONER

## 2022-07-19 PROCEDURE — 83718 ASSAY OF LIPOPROTEIN: CPT | Performed by: NURSE PRACTITIONER

## 2022-07-19 PROCEDURE — 36415 COLL VENOUS BLD VENIPUNCTURE: CPT | Performed by: NURSE PRACTITIONER

## 2022-07-19 PROCEDURE — 99204 OFFICE O/P NEW MOD 45 MIN: CPT | Mod: ,,, | Performed by: PSYCHIATRY & NEUROLOGY

## 2022-07-19 PROCEDURE — 86141 C-REACTIVE PROTEIN HS: CPT | Performed by: NURSE PRACTITIONER

## 2022-07-19 PROCEDURE — 96372 THER/PROPH/DIAG INJ SC/IM: CPT | Mod: 59 | Performed by: NURSE PRACTITIONER

## 2022-07-19 PROCEDURE — 25000003 PHARM REV CODE 250: Performed by: NURSE PRACTITIONER

## 2022-07-19 PROCEDURE — 87635 SARS-COV-2 COVID-19 AMP PRB: CPT | Performed by: EMERGENCY MEDICINE

## 2022-07-19 PROCEDURE — 25500020 PHARM REV CODE 255: Performed by: INTERNAL MEDICINE

## 2022-07-19 PROCEDURE — 99204 PR OFFICE/OUTPT VISIT, NEW, LEVL IV, 45-59 MIN: ICD-10-PCS | Mod: ,,, | Performed by: PSYCHIATRY & NEUROLOGY

## 2022-07-19 PROCEDURE — 85379 FIBRIN DEGRADATION QUANT: CPT | Performed by: NURSE PRACTITIONER

## 2022-07-19 PROCEDURE — 84484 ASSAY OF TROPONIN QUANT: CPT | Performed by: STUDENT IN AN ORGANIZED HEALTH CARE EDUCATION/TRAINING PROGRAM

## 2022-07-19 PROCEDURE — 80053 COMPREHEN METABOLIC PANEL: CPT | Performed by: EMERGENCY MEDICINE

## 2022-07-19 PROCEDURE — 63600175 PHARM REV CODE 636 W HCPCS: Performed by: NURSE PRACTITIONER

## 2022-07-19 PROCEDURE — 87077 CULTURE AEROBIC IDENTIFY: CPT | Performed by: NURSE PRACTITIONER

## 2022-07-19 PROCEDURE — 85025 COMPLETE CBC W/AUTO DIFF WBC: CPT | Performed by: EMERGENCY MEDICINE

## 2022-07-19 PROCEDURE — 85651 RBC SED RATE NONAUTOMATED: CPT | Performed by: NURSE PRACTITIONER

## 2022-07-19 PROCEDURE — 81001 URINALYSIS AUTO W/SCOPE: CPT | Performed by: EMERGENCY MEDICINE

## 2022-07-19 PROCEDURE — 97162 PT EVAL MOD COMPLEX 30 MIN: CPT

## 2022-07-19 PROCEDURE — 96360 HYDRATION IV INFUSION INIT: CPT

## 2022-07-19 PROCEDURE — 36415 COLL VENOUS BLD VENIPUNCTURE: CPT | Performed by: EMERGENCY MEDICINE

## 2022-07-19 PROCEDURE — 82962 GLUCOSE BLOOD TEST: CPT

## 2022-07-19 PROCEDURE — 84443 ASSAY THYROID STIM HORMONE: CPT | Performed by: NURSE PRACTITIONER

## 2022-07-19 PROCEDURE — 82728 ASSAY OF FERRITIN: CPT | Performed by: NURSE PRACTITIONER

## 2022-07-19 PROCEDURE — 96361 HYDRATE IV INFUSION ADD-ON: CPT

## 2022-07-19 PROCEDURE — 87633 RESP VIRUS 12-25 TARGETS: CPT | Performed by: INTERNAL MEDICINE

## 2022-07-19 RX ORDER — CLOPIDOGREL BISULFATE 75 MG/1
75 TABLET ORAL DAILY
Status: DISCONTINUED | OUTPATIENT
Start: 2022-07-19 | End: 2022-07-19

## 2022-07-19 RX ORDER — ASPIRIN 81 MG/1
81 TABLET ORAL DAILY
Status: DISCONTINUED | OUTPATIENT
Start: 2022-07-20 | End: 2022-07-19

## 2022-07-19 RX ORDER — PANTOPRAZOLE SODIUM 40 MG/1
TABLET, DELAYED RELEASE ORAL
COMMUNITY
End: 2023-01-31

## 2022-07-19 RX ORDER — METOPROLOL TARTRATE 1 MG/ML
5 INJECTION, SOLUTION INTRAVENOUS EVERY 5 MIN PRN
Status: DISCONTINUED | OUTPATIENT
Start: 2022-07-19 | End: 2022-07-20 | Stop reason: HOSPADM

## 2022-07-19 RX ORDER — ENOXAPARIN SODIUM 100 MG/ML
30 INJECTION SUBCUTANEOUS EVERY 24 HOURS
Status: DISCONTINUED | OUTPATIENT
Start: 2022-07-19 | End: 2022-07-20 | Stop reason: HOSPADM

## 2022-07-19 RX ORDER — PREDNISOLONE ACETATE 10 MG/ML
SUSPENSION/ DROPS OPHTHALMIC
COMMUNITY
Start: 2022-03-22 | End: 2023-01-31

## 2022-07-19 RX ORDER — TENOFOVIR ALAFENAMIDE 25 MG/1
TABLET ORAL
COMMUNITY
Start: 2021-11-04 | End: 2022-12-09 | Stop reason: SDUPTHER

## 2022-07-19 RX ORDER — TAMSULOSIN HYDROCHLORIDE 0.4 MG/1
CAPSULE ORAL
COMMUNITY
Start: 2022-03-22 | End: 2023-01-31

## 2022-07-19 RX ORDER — PANTOPRAZOLE SODIUM 40 MG/1
40 TABLET, DELAYED RELEASE ORAL DAILY
Status: DISCONTINUED | OUTPATIENT
Start: 2022-07-19 | End: 2022-07-20 | Stop reason: HOSPADM

## 2022-07-19 RX ORDER — INSULIN ASPART 100 [IU]/ML
1-10 INJECTION, SOLUTION INTRAVENOUS; SUBCUTANEOUS EVERY 6 HOURS PRN
Status: DISCONTINUED | OUTPATIENT
Start: 2022-07-19 | End: 2022-07-20 | Stop reason: HOSPADM

## 2022-07-19 RX ORDER — LABETALOL HYDROCHLORIDE 5 MG/ML
10 INJECTION, SOLUTION INTRAVENOUS
Status: DISCONTINUED | OUTPATIENT
Start: 2022-07-19 | End: 2022-07-20 | Stop reason: HOSPADM

## 2022-07-19 RX ORDER — METFORMIN HYDROCHLORIDE 500 MG/1
TABLET ORAL
COMMUNITY
End: 2023-05-15

## 2022-07-19 RX ORDER — FUROSEMIDE 40 MG/1
TABLET ORAL
Status: ON HOLD | COMMUNITY
Start: 2022-07-11 | End: 2023-10-20

## 2022-07-19 RX ORDER — ASPIRIN 81 MG/1
81 TABLET ORAL DAILY
Status: DISCONTINUED | OUTPATIENT
Start: 2022-07-20 | End: 2022-07-20 | Stop reason: HOSPADM

## 2022-07-19 RX ORDER — ASPIRIN 81 MG/1
81 TABLET ORAL ONCE
Status: COMPLETED | OUTPATIENT
Start: 2022-07-19 | End: 2022-07-19

## 2022-07-19 RX ORDER — METOPROLOL TARTRATE 25 MG/1
TABLET, FILM COATED ORAL
COMMUNITY
End: 2023-05-15 | Stop reason: ALTCHOICE

## 2022-07-19 RX ORDER — AMLODIPINE BESYLATE 5 MG/1
TABLET ORAL
Status: ON HOLD | COMMUNITY
End: 2023-12-01 | Stop reason: HOSPADM

## 2022-07-19 RX ORDER — ATORVASTATIN CALCIUM 10 MG/1
10 TABLET, FILM COATED ORAL DAILY
Status: DISCONTINUED | OUTPATIENT
Start: 2022-07-19 | End: 2022-07-20 | Stop reason: HOSPADM

## 2022-07-19 RX ORDER — SIMVASTATIN 20 MG/1
TABLET, FILM COATED ORAL
Status: ON HOLD | COMMUNITY
End: 2023-12-01 | Stop reason: SDUPTHER

## 2022-07-19 RX ORDER — GLUCAGON 1 MG
1 KIT INJECTION
Status: DISCONTINUED | OUTPATIENT
Start: 2022-07-19 | End: 2022-07-20 | Stop reason: HOSPADM

## 2022-07-19 RX ORDER — OXYBUTYNIN CHLORIDE 5 MG/1
TABLET, EXTENDED RELEASE ORAL
COMMUNITY
End: 2023-01-31

## 2022-07-19 RX ORDER — SODIUM CHLORIDE 9 MG/ML
INJECTION, SOLUTION INTRAVENOUS CONTINUOUS
Status: DISCONTINUED | OUTPATIENT
Start: 2022-07-19 | End: 2022-07-20 | Stop reason: HOSPADM

## 2022-07-19 RX ORDER — IBUPROFEN 200 MG
24 TABLET ORAL
Status: DISCONTINUED | OUTPATIENT
Start: 2022-07-19 | End: 2022-07-20 | Stop reason: HOSPADM

## 2022-07-19 RX ORDER — ACETAMINOPHEN 325 MG/1
650 TABLET ORAL EVERY 6 HOURS PRN
Status: DISCONTINUED | OUTPATIENT
Start: 2022-07-19 | End: 2022-07-20 | Stop reason: HOSPADM

## 2022-07-19 RX ORDER — ONDANSETRON 2 MG/ML
4 INJECTION INTRAMUSCULAR; INTRAVENOUS EVERY 4 HOURS PRN
Status: DISCONTINUED | OUTPATIENT
Start: 2022-07-19 | End: 2022-07-20 | Stop reason: HOSPADM

## 2022-07-19 RX ORDER — OMEPRAZOLE 40 MG/1
CAPSULE, DELAYED RELEASE ORAL
COMMUNITY
End: 2023-01-31

## 2022-07-19 RX ORDER — ASPIRIN 81 MG/1
TABLET ORAL
Status: ON HOLD | COMMUNITY
End: 2023-12-01 | Stop reason: HOSPADM

## 2022-07-19 RX ORDER — IBUPROFEN 200 MG
16 TABLET ORAL
Status: DISCONTINUED | OUTPATIENT
Start: 2022-07-19 | End: 2022-07-20 | Stop reason: HOSPADM

## 2022-07-19 RX ADMIN — CLOPIDOGREL 75 MG: 75 TABLET, FILM COATED ORAL at 08:07

## 2022-07-19 RX ADMIN — ACETAMINOPHEN 325MG 650 MG: 325 TABLET ORAL at 02:07

## 2022-07-19 RX ADMIN — ATORVASTATIN CALCIUM 10 MG: 10 TABLET, FILM COATED ORAL at 12:07

## 2022-07-19 RX ADMIN — ENOXAPARIN SODIUM 30 MG: 30 INJECTION SUBCUTANEOUS at 05:07

## 2022-07-19 RX ADMIN — IOPAMIDOL 100 ML: 755 INJECTION, SOLUTION INTRAVENOUS at 04:07

## 2022-07-19 RX ADMIN — ASPIRIN 81 MG: 81 TABLET, COATED ORAL at 05:07

## 2022-07-19 RX ADMIN — SODIUM CHLORIDE: 9 INJECTION, SOLUTION INTRAVENOUS at 05:07

## 2022-07-19 RX ADMIN — PANTOPRAZOLE SODIUM 40 MG: 40 TABLET, DELAYED RELEASE ORAL at 12:07

## 2022-07-19 NOTE — PLAN OF CARE
Problem: Occupational Therapy  Goal: Occupational Therapy Goal  Description: Goals to be met by: 08/02/2022     Patient will increase functional independence with ADLs by performing:    UE Dressing with Modified [place].  LE Dressing with Modified [place].  Grooming while standing with Modified [place].  Toileting from toilet with Modified [place] for hygiene and clothing management.   Toilet transfer to toilet with Modified [place].    Outcome: Ongoing, Progressing

## 2022-07-19 NOTE — H&P
[facility] Hospital Medicine   History & Physical Note      Patient Name: Tonie Tejada  : 1940  MRN: [id number]  Admission Date: 2022   Admitting Service: Hospital Medicine  Attending Physician: Dr. Casey MD  PCP: KUSHAL Uriostegui  Source of history: Patient, patient's family, and EMR.   Code status: Full    Chief Complaint   Altered Mental Status (CONFUSION ONSET . HX HTN AND DM. PT SLOW TO RESPOND BUT AT THIS TIME PT IS GCS 14. PT ALSO C/O CHEST PAIN. )      History of Present Illness   Ms. Tejada is an 81-year-old female with pmhx HTN, DMII and ZAID previously on Plavix who presents to the ED with AMS that started around 9:00 p.m. yesterday evening.  Daughter is at bedside states the patient was very confused yesterday evening and had some episodes of expressive aphasia she was concerned and she brought her to the ED for further evaluation. At baseline she is oriented x4 independent of ADLs. No reports of unilateral weakness, slurred speech, facial droop or hx of TIA/CVA.  Patient was admitted back in 2021 after a syncopal episode and she underwent a carotid ultrasound that showed 50-69% stenosis in the left ICA and was placed on Plavix however at her follow-up appointment she states that she was only on Plavix for 1 month and that was stopped.  She does take a baby aspirin daily.    Initial ED VS: Temp 98.8°, heart rate 84, respirations 18, blood pressure 135/71, oxygenation 97% room air.  COVID-19 positive, patient does report a nonproductive cough x1 day.  No fever chills or known sick contacts.  She did receive COVID vaccine however unable to determine if she received her booster.  Her chest x-ray was unremarkable and his PO2 was 100% on room air.  Her CT of her head was negative for acute intracranial findings and she was being admitted to the hospitalist service for stroke workup     ROS   Except as documented, all other systems reviewed and  negative     Past Medical History   Essential hypertension  Hyperlipidemia  ZAID    Past Surgical History   No past surgical history on file.    Social History   Denies alcohol, tobacco or illicit drug use    Family History   Mother with HTN    Allergies   Patient has no known allergies.    Home Medications     Prior to Admission medications    Not on File        Inpatient Medications   Scheduled Meds   enoxaparin  30 mg Subcutaneous Daily      [START ON 7/20/2022] aspirin  81 mg Oral Daily    clopidogreL  75 mg Oral Daily    enoxaparin  30 mg Subcutaneous Daily     Continuous Infusions   sodium chloride 0.9% 75 mL/hr at 07/19/22 0545     PRN Meds  acetaminophen, labetalol, metoprolol, ondansetron    Physical Exam   Vital Signs  Temp:  [98.5 °F (36.9 °C)-98.8 °F (37.1 °C)]   Pulse:  [84-93]   Resp:  [16-20]   BP: (121-151)/(65-86)   SpO2:  [96 %-97 %]       General: Appears comfortable, disoriented to person, place and time  HEENT: NC/AT  Neck:  No JVD  Chest: CTABL  CVS: Regular rhythm. Normal S1/S2.  Abdomen: nondistended, normoactive BS, soft and non-tender.  MSK: No obvious deformity or joint swelling  Skin: Warm and dry  Neuro: motor strength 5/5 BUE and BLE, no pronator drift, confused. Disoriented to person, place and time. Able to answer some questions appropriately  Psych: Cooperative    Labs     Recent Labs     07/19/22  0005   WBC 11.2   RBC 4.02*   HGB 12.0   HCT 35.2*   MCV 87.6   MCH 29.9   MCHC 34.1   RDW 12.5        No results for input(s): LACTIC in the last 72 hours.  No results for input(s): INR, APTT, D-DIMER in the last 72 hours.  Recent Labs     07/19/22  0442   CHOL 138   TRIG 116   LDL 89.00   VLDL 23   HDL 26*      Recent Labs     07/19/22  0005 07/19/22  0442   *  --    K 4.1  --    CHLORIDE 100  --    CO2 24  --    BUN 32.0*  --    CREATININE 1.82*  --    GLUCOSE 177*  --    CALCIUM 9.2  --    ALBUMIN 3.9  --    GLOBULIN 4.0*  --    ALKPHOS 66  --    ALT 9  --    AST 14   --    BILITOT 0.9  --    TSH  --  1.0540     Recent Labs     07/19/22  0005   TROPONINI 0.013          Microbiology Results (last 7 days)     ** No results found for the last 168 hours. **         Imaging     CT head (if fall & altered, LOC>2, on Warfarin or other anticoagulants)   Final Result      No acute intracranial abnormality identified.  Findings of chronic microvascular ischemic disease.         Electronically signed by: Rory Zambrano   Date:    07/19/2022   Time:    07:30      X-Ray Chest 1 View   Final Result      No acute cardiopulmonary process.         Electronically signed by: Rory Zambrano   Date:    07/19/2022   Time:    07:22      CTA Head and Neck (xpd)    (Results Pending)   MRI Brain Limited Without Contrast    (Results Pending)     Assessment & Plan   Acute Encephalopathy with reported expressive aphasia  Possible TIA vs CVA  Acute Kidney Injury  NIDDM  Essential HTN  ZAID previosly on Plavix  COVID-19 Infection    PLAN:  - BCX2, UC pending. Resp PCR. Procalcitonin, LDH, ferritin, BNP Pt with non-productive cough, CXR negative. Continue supportive care, consider starting Remdesivir pending further workup.    - Stroke Protocol, CTA Head and Neck negative for LVO pending. MRI Brain w/o, done results pending. Echocardiogram w/ BS and CUS  - Telemetry. Neurology consult pending  - LR @ 125cc/hr. Avoid nephrotoxic agents. HOLD metformin for now  - HOLD home antihypertensives and allow for permissive hypertension at this time.   - ASA 81mg daily, Plavix restarted.   - Check A1c and Lipids.resume home statin  -  -VTE Prophylaxis: Lovenox  - CBC, CMP, Mag, phos in AM    I, ROBI NguyenP-C have discussed this patients case with Dr. Peña who agrees with the diagnosis     and treatment plan.    Patient was seen and examined and I agree above note    82 years old female history of hypertension presented to emergency room secondary to expressive aphasia and confusion for 1 day    physical exam shows  generally alert and oriented, along bilateral clear to auscultation, heart regular S1-S2, extremity no edema,  bilateral arms motor 5/5, bilateral legs more 5 a 5, no slurred speech    Lab work showed WBC 65766, creatinine 1.8, baseline creatinine 1.1, , COVID-19 test was positive and patient got 2 shots of the COVID vaccine    CT shows no brain hemorrhage, a CTA of the brain shows no blockage, brain MRI was unremarkable    The patient was admitted the hospital for TIA and acute on chronic renal failure    We will consult physical therapy and speech therapy and hydrate patient with lactated Ringer solution 125 cc per

## 2022-07-19 NOTE — PT/OT/SLP EVAL
Physical Therapy Evaluation    Patient Name:  Tonie Tejada   MRN:  [id number]    Recommendations:     Discharge Recommendations:  home health PT   Discharge Equipment Recommendations: none   Barriers to discharge: None    Assessment:     Tonie Tejada is a 81 y.o. female admitted with a medical diagnosis of Expressive aphasia. MRI was negative for acute intracranial infarct. She is COVID +. Patient reports living with spouse in house with 4 steps (rail on left) to enter. She is independent and ambulates with RW.  She presents with the following impairments/functional limitations:  weakness, impaired endurance, impaired self care skills, impaired functional mobility, gait instability, impaired balance, decreased safety awareness. LLE slightly weaker than RLE. She required MIN A for bed mobility. Ambulated 4 steps forwards/backwards/side-to-side with CGA. Patient would benefit from PT while hospitalized to improve strength and mobility. She will most likely need HH PT at discharge. Progress patient as tolerated.     Rehab Prognosis: Good; patient would benefit from acute skilled PT services to address these deficits and reach maximum level of function.    Recent Surgery: * No surgery found *      Plan:     During this hospitalization, patient to be seen 5 x/week to address the identified rehab impairments via gait training, therapeutic activities, therapeutic exercises, neuromuscular re-education and progress toward the following goals:    · Plan of Care Expires:  08/19/22    Subjective     Chief Complaint: none  Patient/Family Comments/goals: none  Pain/Comfort:  · Pain Rating 1: 0/10    Patients cultural, spiritual, Tenriism conflicts given the current situation: no    Living Environment:  Lives with spouse in single level home with 4 steps (rail on left) to enter.   Prior to admission, patients level of function was independent.  Equipment used at home: walker, rolling.  DME owned (not currently used):  none.  Upon discharge, patient will have assistance from family.    Objective:     Communicated with nurse prior to session.  Patient found supine with telemetry  upon PT entry to room.    General Precautions: Standard, droplet, fall, contact, airborne (COVID +)   Orthopedic Precautions:N/A   Braces: N/A  Respiratory Status: Room air    Exams:  · Cognitive Exam:  Patient is oriented to Person, Place, Time and Situation  · Sensation:    · -       Intact  · RLE ROM: WFL  · RLE Strength: 4/5 grossly  · LLE ROM: WFL  · LLE Strength: -4/5 grossly    Functional Mobility:  · Bed Mobility:     · Supine to Sit: minimum assistance  · Sit to Supine: minimum assistance  · Transfers:     · Sit to Stand:  minimum assistance with rolling walker  · Gait: 4 steps forwards/backwards/side-to-side with CGA.  · Balance: fair   ·   AM-PAC 6 CLICK MOBILITY  Total Score:18     Patient left supine with all lines intact, call button in reach and daughter present.    GOALS:   Multidisciplinary Problems     Physical Therapy Goals        Problem: Physical Therapy    Goal Priority Disciplines Outcome Goal Variances Interventions   Physical Therapy Goal     PT, PT/OT Ongoing, Progressing     Description: Goals to be met by: 22     Patient will increase functional independence with mobility by performin. Supine to sit with Set-up [place]  2. Sit to supine with Set-up [place]  3. Sit to stand transfer with Supervision  4. Gait  x 300 feet with Supervision using Rolling Walker.                      History:     No past medical history on file.    No past surgical history on file.    Time Tracking:     PT Received On: 22  PT Start Time: 1150     PT Stop Time: 1212  PT Total Time (min): 22 min     Billable Minutes:Moderate Complexity Evaluation 22 minutes      2022

## 2022-07-19 NOTE — ED PROVIDER NOTES
Encounter Date: 7/18/2022    SCRIBE #1 NOTE: I, Mell Booker, am scribing for, and in the presence of,  Nuria Tamela. I have scribed the following portions of the note - Other sections scribed: HPI, ROS, Physical Exam, EKG.       History     Chief Complaint   Patient presents with    Altered Mental Status     CONFUSION ONSET 2115. HX HTN AND DM. PT SLOW TO RESPOND BUT AT THIS TIME PT IS GCS 14. PT ALSO C/O CHEST PAIN.      82 y/o female with hx of HTN and DM presents to the ED due to altered mental status starting at 2115. Daughter reports the patient has had difficulties finding her words, slow cognition, and slow movement. Daughter says her baseline is GCS 14. Pt says she has had a cough with sputum for 1 day. Triage note says she had chest pain, but upon examination she denies any pain or fever. Daughter notes in November she had a similar episode and was told she had an arterial blockage in her neck. Pt was on Plavix for 1mth after episode. She is no longer on blood thinners. HPI and ROS limited due to pt condition.    The history is provided by the patient and a relative (Daughter).   Altered Mental Status  This is a new problem. The problem occurs constantly. The problem has not changed since onset.Pertinent negatives include no headaches and no shortness of breath. Nothing aggravates the symptoms. Nothing relieves the symptoms.     Review of patient's allergies indicates:  No Known Allergies  Past Medical History:   Diagnosis Date    DM (diabetes mellitus)     HLD (hyperlipidemia)     HTN (hypertension)     TIA (transient ischemic attack)      History reviewed. No pertinent surgical history.  History reviewed. No pertinent family history.     Review of Systems   Unable to perform ROS: Mental status change   Respiratory: Positive for cough. Negative for shortness of breath.    Neurological: Negative for headaches.   Psychiatric/Behavioral: Positive for confusion.       Physical Exam     Initial Vitals  [07/18/22 2326]   BP Pulse Resp Temp SpO2   135/71 84 18 98.8 °F (37.1 °C) 97 %      MAP       --         Physical Exam    Nursing note and vitals reviewed.  Constitutional: No distress.   HENT:   Head: Normocephalic and atraumatic.   Mouth/Throat: Oropharynx is clear and moist.   Eyes: Conjunctivae are normal.   Neck: Neck supple.   Normal range of motion.  Cardiovascular: Normal rate and regular rhythm.   No murmur heard.  Pulmonary/Chest: Breath sounds normal. No respiratory distress. She exhibits no tenderness.   Mild non-productive cough   Abdominal: Abdomen is soft. Bowel sounds are normal. She exhibits no distension. There is no abdominal tenderness.   Musculoskeletal:         General: Normal range of motion.      Cervical back: Normal range of motion and neck supple.      Lumbar back: Normal. Normal range of motion.     Neurological: She is alert and oriented to person, place, and time. She has normal strength. No cranial nerve deficit or sensory deficit.   No focal deficit and no facial droop.  Motor strength normal.  Able to name objects but slow to respond.     Skin: Skin is warm and dry.         ED Course   Procedures  Labs Reviewed   COMPREHENSIVE METABOLIC PANEL - Abnormal; Notable for the following components:       Result Value    Sodium Level 135 (*)     Glucose Level 177 (*)     Blood Urea Nitrogen 32.0 (*)     Creatinine 1.82 (*)     Protein Total 7.9 (*)     Globulin 4.0 (*)     Albumin/Globulin Ratio 1.0 (*)     All other components within normal limits   URINALYSIS, REFLEX TO URINE CULTURE - Abnormal; Notable for the following components:    Leukocyte Esterase, UA Trace (*)     All other components within normal limits   CBC WITH DIFFERENTIAL - Abnormal; Notable for the following components:    RBC 4.02 (*)     Hct 35.2 (*)     MPV 13.5 (*)     Lymph # 0.56 (*)     Neut # 9.8 (*)     IG# 0.06 (*)     All other components within normal limits   URINALYSIS, MICROSCOPIC - Abnormal; Notable for the  following components:    Squamous Epithelial Cells, UA 8-12 (*)     All other components within normal limits   POCT GLUCOSE - Abnormal; Notable for the following components:    POCT Glucose 167 (*)     All other components within normal limits   TROPONIN I - Normal   CBC W/ AUTO DIFFERENTIAL    Narrative:     The following orders were created for panel order CBC auto differential.  Procedure                               Abnormality         Status                     ---------                               -----------         ------                     CBC with Differential[[id number]]        Abnormal            Final result                 Please view results for these tests on the individual orders.   SARS-COV-2 RNA AMPLIFICATION, QUAL   POCT GLUCOSE MONITORING CONTINUOUS     EKG Readings: (Independently Interpreted)   Rhythm: Normal Sinus Rhythm. Heart Rate: 85. Ectopy: No Ectopy. Conduction: RBBB. ST Segments: Normal ST Segments. Clinical Impression: Normal Sinus Rhythm   2327  No previous EKG to compare  T wave inversion diffusely                          CT head (if fall & altered, LOC>2, on Warfarin or other anticoagulants) (Final result)  Result time 07/19/22 07:30:02    Final result by Rory Zambrano MD (07/19/22 07:30:02)                 Impression:      No acute intracranial abnormality identified.  Findings of chronic microvascular ischemic disease.      Electronically signed by: Rory Zambrano  Date:    07/19/2022  Time:    07:30             Narrative:    EXAMINATION:  CT HEAD WITHOUT CONTRAST    CLINICAL HISTORY:  Altered mental status, nontraumatic (Ped 0-18y);    TECHNIQUE:  Low dose axial images were obtained through the head.  Coronal and sagittal reformations were also performed. Contrast was not administered.    Automatic exposure control was utilized to reduce the patient's radiation dose.    DLP= 871    COMPARISON:  None.    FINDINGS:  No acute intracranial hemorrhage, edema or mass. No  acute parenchymal abnormality.    Diffuse cerebral atrophy with concordant ventricular enlargement    Scattered hypodensities throughout the deep periventricular white matter..    The osseous structures are normal.    The mastoid air cells are clear.    The auditory canals are patent bilaterally.    The globes and orbital contents are normal bilaterally.    The visualized maxillary, ethmoid and sphenoid sinuses are clear.                    Preliminary result by Rory Zambrano MD (07/19/22 00:48:38)                 Narrative:    START OF REPORT:  Technique: CT of the head was performed without intravenous contrast with axial as well as coronal and sagittal images.    Comparison: None.    Dosage Information: Automated exposure control was utilized.    Clinical history: Fall, ams.    Findings:  Hemorrhage: No acute intracranial hemorrhage is seen.  CSF spaces: The ventricles, sulci and basal cisterns all appear mildly prominent suggesting an element of global cerebral atrophy.  Brain parenchyma: There is preservation of the grey white junction throughout. Moderate microvascular change is seen in portions of the periventricular and deep white matter tracts.  Cerebellum: Unremarkable.  Sella and skull base: The sella appears to be within normal limits for age.  Intracranial calcifications: Incidental note is made of bilateral choroid plexus calcification. Incidental note is made of some pineal region calcification.  Calvarium: No acute linear or depressed skull fracture is seen.    Maxillofacial Structures:  Paranasal sinuses: The visualized paranasal sinuses appear clear with no mucoperiosteal thickening or air fluid levels identified.  Orbits: The orbits appear unremarkable.  Zygomatic arches: The zygomatic arches are intact and unremarkable.  Temporal bones and mastoids: The temporal bones and mastoids appear unremarkable.  TMJ: Degenerative changes left TMJ.      Impression:  1. No acute intracranial process  identified. Details and findings as noted above.                                 X-Ray Chest 1 View (Final result)  Result time 07/19/22 07:22:44    Final result by Rory Zambrano MD (07/19/22 07:22:44)                 Impression:      No acute cardiopulmonary process.      Electronically signed by: Rory Zambrano  Date:    07/19/2022  Time:    07:22             Narrative:    EXAMINATION:  XR CHEST 1 VIEW    CLINICAL HISTORY:  ALTERED MENTAL STATUS;    TECHNIQUE:  Single view of the chest    COMPARISON:  11/25/2021    FINDINGS:  No focal opacification, pleural effusion, or pneumothorax.    The cardiomediastinal silhouette is within normal limits.    No acute osseous abnormality.                                 Medications - No data to display  Medical Decision Making:   Initial Assessment:   Ms. Tejada presented for evaluation of altered mental status/slowed cognition w/ word finding difficulty. Also c/o cough. Will obtain CT head/stroke work up as well as CXR, labs including COVID testing and reassess.   Differential Diagnosis:   Delirium, CVA, TIA, UTI, occult infection, pneumonia, viral syndrome  Independently Interpreted Test(s):   I have ordered and independently interpreted EKG Reading(s) - see prior notes  Clinical Tests:   Lab Tests: Ordered and Reviewed  Radiological Study: Ordered and Reviewed  Medical Tests: Ordered and Reviewed  ED Management:  ED work up unrevealing; however, given new onset expressive aphasia/cognitive changes, will admit for further work up/TIA evaluation. Findings and plan discussed with the patient and her family member and they are agreeable to admission at this time.             Scribe Attestation:   Scribe #1: I performed the above scribed service and the documentation accurately describes the services I performed. I attest to the accuracy of the note.    Attending Attestation:           Physician Attestation for Scribe:  Physician Attestation Statement for Scribe #1: I,  Nuria Rapp MD, reviewed documentation, as scribed by Mell Booker in my presence, and it is both accurate and complete.             ED Course as of 07/19/22 0350   Tue Jul 19, 2022   0336 Hospitalist Consult [SS]      ED Course User Index  [SS] Mell Booker             Clinical Impression:   Final diagnoses:  [R47.01] Expressive aphasia  [R41.0] Delirium (Primary)  [N17.9, N18.9] Acute kidney injury superimposed on chronic kidney disease          ED Disposition Condition    Observation               Nuria Rapp MD  08/10/22 2172

## 2022-07-19 NOTE — PT/OT/SLP PROGRESS
SLP orders received, chart reviewed, and nursing consulted. Attempted to see pt for initial speech/language/cognitive evaluation, however pt unavailable due to testing.  Will reattempt as schedule allows.

## 2022-07-19 NOTE — SUBJECTIVE & OBJECTIVE
No past medical history on file.    No past surgical history on file.    Review of patient's allergies indicates:  No Known Allergies      No current facility-administered medications on file prior to encounter.     No current outpatient medications on file prior to encounter.     Family History    None       Tobacco Use    Smoking status: Not on file    Smokeless tobacco: Not on file   Substance and Sexual Activity    Alcohol use: Not on file    Drug use: Not on file    Sexual activity: Not on file     Review of Systems   Unable to perform ROS: Other (ROS deferred to MD due to COVID-19 status)     Objective:     Vital Signs (Most Recent):  Temp: 98.5 °F (36.9 °C) (07/19/22 0700)  Pulse: 93 (07/19/22 0700)  Resp: 20 (07/19/22 0700)  BP: 121/65 (07/19/22 0700)  SpO2: 97 % (07/19/22 0700) Vital Signs (24h Range):  Temp:  [98.5 °F (36.9 °C)-98.8 °F (37.1 °C)] 98.5 °F (36.9 °C)  Pulse:  [84-93] 93  Resp:  [16-20] 20  SpO2:  [96 %-97 %] 97 %  BP: (121-151)/(65-86) 121/65     Weight: 76.2 kg (168 lb)  Body mass index is 30.73 kg/m².    Physical Exam  PE deferred to MD due to COVID-19 status    Significant Labs: BMP:   Recent Labs   Lab 07/19/22  0005   *   K 4.1   CO2 24   BUN 32.0*   CREATININE 1.82*   CALCIUM 9.2     CBC:   Recent Labs   Lab 07/19/22  0005   WBC 11.2   HGB 12.0   HCT 35.2*          Significant Imaging: I have reviewed all pertinent imaging results/findings within the past 24 hours.

## 2022-07-19 NOTE — ASSESSMENT & PLAN NOTE
Expressive aphasia - r/o stroke    MRI brain negative for acute infarct  Continue ASA and Plavix  Continue Atorvastatin 10mg daily        Antithrombotics for secondary stroke prevention: Antiplatelets: Aspirin: 81 mg daily, Clopidogrel: 75 mg daily    Statins for secondary stroke prevention and hyperlipidemia, if present: Statins: Atorvastatin- 10 mg daily    Aggressive risk factor modification: HTN, HLD, carotid artery stenosis        Rehab efforts: The patient has been evaluated by a stroke team provider and the therapy needs have been fully considered based off the presenting complaints and exam findings. The following therapy evaluations are needed: PT evaluate and treat, OT evaluate and treat, SLP evaluate and treat    Diagnostics ordered/pending: None   Stroke workup negative for acute infarct  -CTh: no acute intracranial abnormality  -CTA h/n: no LVO or flow-limiting stenosis  -MRI brain: no acute infarct identified  -ECHO: completed, interpretation pending  -CUS: completed, interpretation pending  -LDL: 89  -TSH: 1.0540  -CRP: 26.34  -not on antiplatelet, anticoagulation, or statin therapy at home    VTE prophylaxis: Enoxaparin (CrCl < 30 ml/min) 30 mg SQ every 24 hours, Mechanical prophylaxis: Place SCDs    BP parameters: TIA?: normalize blood pressure

## 2022-07-19 NOTE — PLAN OF CARE
Problem: Physical Therapy  Goal: Physical Therapy Goal  Description: Goals to be met by: 22     Patient will increase functional independence with mobility by performin. Supine to sit with Set-up [place]  2. Sit to supine with Set-up [place]  3. Sit to stand transfer with Supervision  4. Gait  x 300 feet with Supervision using Rolling Walker.   5. Patient will ascend/descend 4 steps with CGA    Outcome: Ongoing, Progressing

## 2022-07-19 NOTE — PT/OT/SLP EVAL
Occupational Therapy   Evaluation    Name: Tonie Tejada  MRN: [id number]  Admitting Diagnosis:  Expressive aphasia, COVID+  Recent Surgery: * No surgery found *      Recommendations:     Discharge Recommendations: home with home health  Discharge Equipment Recommendations:  none  Barriers to discharge:  None    Assessment:     Tonie Tejada is a 81 y.o. female with a medical diagnosis of Expressive aphasia, COVID+. Pt lives w/  in SS home w/ 4 steps to enter. Pt is IND w/ ADLs and utilizes RW for mobility. Performance deficits affecting function: weakness, gait instability, impaired endurance, impaired balance, impaired self care skills, impaired functional mobility, impaired cognition, decreased coordination. Pt diagnosed w/ expressive aphasia, however MRI noted to be negative. Pt alert and oriented X 4, w/ some delayed responses. Pt noted w/ some weakness to L side, and impaired coordination to L side. Pt reports intact light touch sensation to BUEs, overshooting noted during visual tracking assessment. Pt performed bed mobility w/ min A and performed side steps w/ CGA and HHA. OT recs for d/c include home w/ HH services and family care.     Rehab Prognosis: Good; patient would benefit from acute skilled OT services to address these deficits and reach maximum level of function.       Plan:     Patient to be seen 5 x/week, 3 x/week to address the above listed problems via self-care/home management, therapeutic activities, therapeutic exercises  · Plan of Care Expires: 08/02/22  · Plan of Care Reviewed with: patient, daughter    Subjective     Patient/Family Comments/goals: To get well    Occupational Profile:  Living Environment: Lives w/  in SS home, 4 steps to enter, tub/shower  Previous level of function: IND w/ ADLs, ambulates w/ RW  Equipment Used at Home:  walker, rolling, shower chair  Assistance upon Discharge: Self, assistance from family as needed    Pain/Comfort:  · Pain Rating 1:  0/10    Objective:     Communicated with: RN prior to session.  Patient found supine with telemetry, PureWick, pulse ox (continuous), blood pressure cuff upon OT entry to room.    General Precautions: Standard, droplet, contact, fall, airborne   Respiratory Status: Room air, O2 WNL throughout OT eval    Occupational Performance:    Bed Mobility:    · Patient completed Supine to Sit with minimum assistance  · Patient completed Sit to Supine with minimum assistance    Functional Mobility/Transfers:  · Patient completed Sit <> Stand Transfer with contact guard assistance  with  hand-held assist   · Functional Mobility: Pt performed side and forward stepping w/ CGA and HHA    Activities of Daily Living:  · Not performed at this time    Cognitive/Visual Perceptual:  Cognitive/Psychosocial Skills:     -       Oriented to: Person, Place and Time   -       Follows Commands/attention:Follows two-step commands  -       Communication: Delayed responses, appropriate   -       Safety awareness/insight to disability: impaired   Visual/Perceptual:      -Impaired  tracking w/ increased overshooting noted    Physical Exam:  Sensation:    -       Intact  light/touch BUEs  Upper Extremity Range of Motion:     -       Right Upper Extremity: WNL  -       Left Upper Extremity: WFL  Upper Extremity Strength:    -       Right Upper Extremity: 4-/5  -       Left Upper Extremity: 3+/5  Fine Motor Coordination:    -       Impaired  Left hand thumb/finger opposition skills     AMPAC 6 Click ADL:  AMPAC Total Score: 18  Education:    Patient left HOB elevated with all lines intact, call button in reach, RN notified and daughter present    GOALS:   Multidisciplinary Problems     Occupational Therapy Goals        Problem: Occupational Therapy    Goal Priority Disciplines Outcome Interventions   Occupational Therapy Goal     OT, PT/OT Ongoing, Progressing    Description: Goals to be met by: 08/02/2022     Patient will increase functional  independence with ADLs by performing:    UE Dressing with Modified [place].  LE Dressing with Modified [place].  Grooming while standing with Modified [place].  Toileting from toilet with Modified [place] for hygiene and clothing management.   Toilet transfer to toilet with Modified [place].                     History:     No past medical history on file.    No past surgical history on file.    Time Tracking:     OT Date of Treatment: 07/19/22  OT Start Time: 1145  OT Stop Time: 1200  OT Total Time (min): 15 min    Billable Minutes:Evaluation 15 Minutes Mod Complex    7/19/2022

## 2022-07-19 NOTE — HPI
Ms Tejada is a 81-year-old female with past medical history of HTN, DM, carotid artery stenosis (previously on DAPT), presented to ED on 7/19 with reports of difficulty finding her words, slow cognition, and slow movement.  She also reported productive cough x 1 day.  CTh showed no acute intracranial abnormalities.  She was admitted to Hospitalist service and neurology was consulted for stroke workup.

## 2022-07-19 NOTE — CONSULTS
[facility] - Emergency Dept  Neurology  Consult Note    Patient Name: Tonie Tejada  MRN: [id number]  Admission Date: 7/18/2022  Hospital Length of Stay: 0 days  Code Status: Full Code   Attending Provider: Lashawn Chaves MD   Consulting Provider: Nancy Hodgson MD  Primary Care Physician: KUSHAL Uriostegui  Principal Problem:Expressive aphasia    Inpatient consult to Vascular (Stroke) Neurology  Consult performed by: KUSHAL Hinds  Consult ordered by: Raven Llanes, [facility]         Subjective:     Chief Complaint:    Chief Complaint   Patient presents with    Altered Mental Status     CONFUSION ONSET 2115. HX HTN AND DM. PT SLOW TO RESPOND BUT AT THIS TIME PT IS GCS 14. PT ALSO C/O CHEST PAIN.          HPI:   Ms Tejada is a 81-year-old female with past medical history of HTN, DM, carotid artery stenosis (previously on DAPT), presented to ED on 7/19 with reports of difficulty finding her words, slow cognition, and slow movement.  She also reported productive cough x 1 day.  CTh showed no acute intracranial abnormalities.  She was admitted to Hospitalist service and neurology was consulted for stroke workup.       No past medical history on file.    No past surgical history on file.    Review of patient's allergies indicates:  No Known Allergies      No current facility-administered medications on file prior to encounter.     No current outpatient medications on file prior to encounter.     Family History    None       Tobacco Use    Smoking status: Not on file    Smokeless tobacco: Not on file   Substance and Sexual Activity    Alcohol use: Not on file    Drug use: Not on file    Sexual activity: Not on file     Review of Systems   Unable to perform ROS: Other (ROS deferred to MD due to COVID-19 status)     Objective:     Vital Signs (Most Recent):  Temp: 98.5 °F (36.9 °C) (07/19/22 0700)  Pulse: 93 (07/19/22 0700)  Resp: 20 (07/19/22 0700)  BP: 121/65 (07/19/22 0700)  SpO2: 97  % (07/19/22 0700) Vital Signs (24h Range):  Temp:  [98.5 °F (36.9 °C)-98.8 °F (37.1 °C)] 98.5 °F (36.9 °C)  Pulse:  [84-93] 93  Resp:  [16-20] 20  SpO2:  [96 %-97 %] 97 %  BP: (121-151)/(65-86) 121/65     Weight: 76.2 kg (168 lb)  Body mass index is 30.73 kg/m².    Physical Exam  PE deferred to MD due to COVID-19 status    Significant Labs: BMP:   Recent Labs   Lab 07/19/22  0005   *   K 4.1   CO2 24   BUN 32.0*   CREATININE 1.82*   CALCIUM 9.2     CBC:   Recent Labs   Lab 07/19/22  0005   WBC 11.2   HGB 12.0   HCT 35.2*          Significant Imaging: I have reviewed all pertinent imaging results/findings within the past 24 hours.    Assessment and Plan:     * Expressive aphasia  Expressive aphasia - r/o stroke    MRI brain negative for acute infarct  Continue ASA   Ok to stop Plavix  Continue Atorvastatin 10mg daily  EEG today        Antithrombotics for secondary stroke prevention: Antiplatelets: Aspirin: 81 mg daily, Clopidogrel: 75 mg daily    Statins for secondary stroke prevention and hyperlipidemia, if present: Statins: Atorvastatin- 10 mg daily    Aggressive risk factor modification: HTN, HLD, carotid artery stenosis        Rehab efforts: The patient has been evaluated by a stroke team provider and the therapy needs have been fully considered based off the presenting complaints and exam findings. The following therapy evaluations are needed: PT evaluate and treat, OT evaluate and treat, SLP evaluate and treat    Diagnostics ordered/pending: None   Stroke workup negative for acute infarct  -CTh: no acute intracranial abnormality  -CTA h/n: no LVO or flow-limiting stenosis  -MRI brain: no acute infarct identified  -ECHO: completed, interpretation pending  -CUS: completed, interpretation pending  -LDL: 89  -TSH: 1.0540  -CRP: 26.34  -not on antiplatelet, anticoagulation, or statin therapy at home    VTE prophylaxis: Enoxaparin (CrCl < 30 ml/min) 30 mg SQ every 24 hours, Mechanical prophylaxis: Place  SCDs    BP parameters: TIA?: normalize blood pressure          Thank you for your consult. Further recommendations to follow by MD.    KUSHAL Hinds  Neurology  [facility] - Emergency Dept

## 2022-07-20 VITALS
TEMPERATURE: 98 F | BODY MASS INDEX: 30.91 KG/M2 | WEIGHT: 168 LBS | HEART RATE: 91 BPM | HEIGHT: 62 IN | SYSTOLIC BLOOD PRESSURE: 141 MMHG | DIASTOLIC BLOOD PRESSURE: 67 MMHG | OXYGEN SATURATION: 99 % | RESPIRATION RATE: 18 BRPM

## 2022-07-20 DIAGNOSIS — U07.1 COVID-19 VIRUS DETECTED: ICD-10-CM

## 2022-07-20 LAB
ABS NEUT (OLG): 3.13 X10(3)/MCL (ref 2.1–9.2)
ALBUMIN SERPL-MCNC: 3.5 GM/DL (ref 3.4–4.8)
ALBUMIN/GLOB SERPL: 1.1 RATIO (ref 1.1–2)
ALP SERPL-CCNC: 54 UNIT/L (ref 40–150)
ALT SERPL-CCNC: 9 UNIT/L (ref 0–55)
ANISOCYTOSIS BLD QL SMEAR: ABNORMAL
AST SERPL-CCNC: 22 UNIT/L (ref 5–34)
AV INDEX (PROSTH): 0.77
AV PEAK GRADIENT: NORMAL MMHG
AV VALVE AREA: 2.43 CM2
AV VELOCITY RATIO: 0.68
BASOPHILS NFR BLD MANUAL: 0.09 X10(3)/MCL (ref 0–0.2)
BASOPHILS NFR BLD MANUAL: 2 %
BILIRUBIN DIRECT+TOT PNL SERPL-MCNC: 0.9 MG/DL
BSA FOR ECHO PROCEDURE: 1.83 M2
BUN SERPL-MCNC: 29.5 MG/DL (ref 9.8–20.1)
BURR CELLS (OLG): ABNORMAL
CALCIUM SERPL-MCNC: 8.7 MG/DL (ref 8.4–10.2)
CHLORIDE SERPL-SCNC: 105 MMOL/L (ref 98–107)
CO2 SERPL-SCNC: 21 MMOL/L (ref 23–31)
CREAT SERPL-MCNC: 1.47 MG/DL (ref 0.55–1.02)
DOP CALC AO PEAK VEL: 152 M/S
DOP CALC AO VTI: 22.2 CM
DOP CALC LVOT AREA: 3.1 CM2
DOP CALC LVOT DIAMETER: 2 CM
DOP CALC LVOT PEAK VEL: 104 M/S
DOP CALC LVOT STROKE VOLUME: 54.01 CM3
DOP CALC MV VTI: 26 CM
DOP CALCLVOT PEAK VEL VTI: 17.2 CM
EJECTION FRACTION: 55 %
EOSINOPHIL NFR BLD MANUAL: 0.23 X10(3)/MCL (ref 0–0.9)
EOSINOPHIL NFR BLD MANUAL: 5 %
ERYTHROCYTE [DISTWIDTH] IN BLOOD BY AUTOMATED COUNT: 12.5 % (ref 11.5–17)
FRACTIONAL SHORTENING: 18 % (ref 28–44)
GLOBULIN SER-MCNC: 3.2 GM/DL (ref 2.4–3.5)
GLUCOSE SERPL-MCNC: 98 MG/DL (ref 82–115)
HCT VFR BLD AUTO: 34.1 % (ref 37–47)
HGB BLD-MCNC: 10.8 GM/DL (ref 12–16)
IMM GRANULOCYTES # BLD AUTO: 0.01 X10(3)/MCL (ref 0–0.04)
IMM GRANULOCYTES NFR BLD AUTO: 0.2 %
INSTRUMENT WBC (OLG): 4.6 X10(3)/MCL
INTERVENTRICULAR SEPTUM: 1.08 CM (ref 0.6–1.1)
LEFT ATRIUM SIZE: 2.8 CM
LEFT INTERNAL DIMENSION IN SYSTOLE: 2.12 CM (ref 2.1–4)
LEFT VENTRICULAR INTERNAL DIMENSION IN DIASTOLE: 2.6 CM (ref 3.5–6)
LYMPHOCYTES NFR BLD MANUAL: 0.55 X10(3)/MCL
LYMPHOCYTES NFR BLD MANUAL: 12 %
MAGNESIUM SERPL-MCNC: 1.8 MG/DL (ref 1.6–2.6)
MCH RBC QN AUTO: 29 PG (ref 27–31)
MCHC RBC AUTO-ENTMCNC: 31.7 MG/DL (ref 33–36)
MCV RBC AUTO: 91.4 FL (ref 80–94)
MONOCYTES NFR BLD MANUAL: 0.64 X10(3)/MCL (ref 0.1–1.3)
MONOCYTES NFR BLD MANUAL: 14 %
MV MEAN GRADIENT: 2 MMHG
MV STENOSIS PRESSURE HALF TIME: 68 MS
MV VALVE AREA BY CONTINUITY EQUATION: 2.08 CM2
MV VALVE AREA P 1/2 METHOD: 3.24 CM2
NEUTROPHILS NFR BLD MANUAL: 68 %
NRBC BLD AUTO-RTO: 0 %
PHOSPHATE SERPL-MCNC: 3.9 MG/DL (ref 2.3–4.7)
PISA TR MAX VEL: 19 M/S
PLATELET # BLD AUTO: 123 X10(3)/MCL (ref 130–400)
PLATELET # BLD EST: ABNORMAL 10*3/UL
PMV BLD AUTO: 13.8 FL (ref 7.4–10.4)
POCT GLUCOSE: 104 MG/DL (ref 70–110)
POCT GLUCOSE: 146 MG/DL (ref 70–110)
POIKILOCYTOSIS BLD QL SMEAR: ABNORMAL
POTASSIUM SERPL-SCNC: 4.2 MMOL/L (ref 3.5–5.1)
PROT SERPL-MCNC: 6.7 GM/DL (ref 5.8–7.6)
RA PRESSURE: 3 MMHG
RBC # BLD AUTO: 3.73 X10(6)/MCL (ref 4.2–5.4)
RBC MORPH BLD: ABNORMAL
SODIUM SERPL-SCNC: 137 MMOL/L (ref 136–145)
TR MAX PG: 1444 MMHG
TV REST PULMONARY ARTERY PRESSURE: 1447 MMHG
WBC # SPEC AUTO: 4.6 X10(3)/MCL (ref 4.5–11.5)

## 2022-07-20 PROCEDURE — 92523 SPEECH SOUND LANG COMPREHEN: CPT

## 2022-07-20 PROCEDURE — 25000242 PHARM REV CODE 250 ALT 637 W/ HCPCS: Performed by: NURSE PRACTITIONER

## 2022-07-20 PROCEDURE — 80053 COMPREHEN METABOLIC PANEL: CPT | Performed by: NURSE PRACTITIONER

## 2022-07-20 PROCEDURE — 25000003 PHARM REV CODE 250: Performed by: NURSE PRACTITIONER

## 2022-07-20 PROCEDURE — G0378 HOSPITAL OBSERVATION PER HR: HCPCS

## 2022-07-20 PROCEDURE — 85025 COMPLETE CBC W/AUTO DIFF WBC: CPT | Performed by: NURSE PRACTITIONER

## 2022-07-20 PROCEDURE — 36415 COLL VENOUS BLD VENIPUNCTURE: CPT | Performed by: NURSE PRACTITIONER

## 2022-07-20 PROCEDURE — 96361 HYDRATE IV INFUSION ADD-ON: CPT

## 2022-07-20 PROCEDURE — 83735 ASSAY OF MAGNESIUM: CPT | Performed by: NURSE PRACTITIONER

## 2022-07-20 PROCEDURE — 97530 THERAPEUTIC ACTIVITIES: CPT | Mod: CQ

## 2022-07-20 PROCEDURE — 97116 GAIT TRAINING THERAPY: CPT | Mod: CQ

## 2022-07-20 PROCEDURE — 84100 ASSAY OF PHOSPHORUS: CPT | Performed by: NURSE PRACTITIONER

## 2022-07-20 RX ORDER — FLUTICASONE PROPIONATE 50 MCG
2 SPRAY, SUSPENSION (ML) NASAL DAILY
Status: DISCONTINUED | OUTPATIENT
Start: 2022-07-20 | End: 2022-07-20 | Stop reason: HOSPADM

## 2022-07-20 RX ORDER — ACETAMINOPHEN 325 MG/1
650 TABLET ORAL EVERY 6 HOURS PRN
Status: DISCONTINUED | OUTPATIENT
Start: 2022-07-20 | End: 2022-07-20 | Stop reason: HOSPADM

## 2022-07-20 RX ORDER — GUAIFENESIN 100 MG/5ML
200 SOLUTION ORAL EVERY 4 HOURS PRN
Status: DISCONTINUED | OUTPATIENT
Start: 2022-07-20 | End: 2022-07-20 | Stop reason: HOSPADM

## 2022-07-20 RX ADMIN — ASPIRIN 81 MG: 81 TABLET, COATED ORAL at 09:07

## 2022-07-20 RX ADMIN — GUAIFENESIN 200 MG: 200 SOLUTION ORAL at 01:07

## 2022-07-20 RX ADMIN — FLUTICASONE PROPIONATE 100 MCG: 50 SPRAY, METERED NASAL at 09:07

## 2022-07-20 RX ADMIN — ATORVASTATIN CALCIUM 10 MG: 10 TABLET, FILM COATED ORAL at 09:07

## 2022-07-20 RX ADMIN — GUAIFENESIN 200 MG: 200 SOLUTION ORAL at 09:07

## 2022-07-20 RX ADMIN — PANTOPRAZOLE SODIUM 40 MG: 40 TABLET, DELAYED RELEASE ORAL at 09:07

## 2022-07-20 RX ADMIN — ACETAMINOPHEN 325MG 650 MG: 325 TABLET ORAL at 01:07

## 2022-07-20 RX ADMIN — SODIUM CHLORIDE: 9 INJECTION, SOLUTION INTRAVENOUS at 09:07

## 2022-07-20 NOTE — PLAN OF CARE
Problem: Infection  Goal: Absence of Infection Signs and Symptoms  Outcome: Ongoing, Progressing     Problem: Adjustment to Illness (Stroke, Ischemic/Transient Ischemic Attack)  Goal: Optimal Coping  Outcome: Ongoing, Progressing     Problem: Bowel Elimination Impaired (Stroke, Ischemic/Transient Ischemic Attack)  Goal: Effective Bowel Elimination  Outcome: Ongoing, Progressing     Problem: Cerebral Tissue Perfusion (Stroke, Ischemic/Transient Ischemic Attack)  Goal: Optimal Cerebral Tissue Perfusion  Outcome: Ongoing, Progressing     Problem: Cognitive Impairment (Stroke, Ischemic/Transient Ischemic Attack)  Goal: Optimal Cognitive Function  Outcome: Ongoing, Progressing     Problem: Communication Impairment (Stroke, Ischemic/Transient Ischemic Attack)  Goal: Improved Communication Skills  Outcome: Ongoing, Progressing     Problem: Functional Ability Impaired (Stroke, Ischemic/Transient Ischemic Attack)  Goal: Optimal Functional Ability  Outcome: Ongoing, Progressing     Problem: Respiratory Compromise (Stroke, Ischemic/Transient Ischemic Attack)  Goal: Effective Oxygenation and Ventilation  Outcome: Ongoing, Progressing     Problem: Sensorimotor Impairment (Stroke, Ischemic/Transient Ischemic Attack)  Goal: Improved Sensorimotor Function  Outcome: Ongoing, Progressing     Problem: Swallowing Impairment (Stroke, Ischemic/Transient Ischemic Attack)  Goal: Optimal Eating and Swallowing without Aspiration  Outcome: Ongoing, Progressing     Problem: Urinary Elimination Impaired (Stroke, Ischemic/Transient Ischemic Attack)  Goal: Effective Urinary Elimination  Outcome: Ongoing, Progressing     Problem: Adult Inpatient Plan of Care  Goal: Plan of Care Review  Outcome: Ongoing, Progressing  Goal: Patient-Specific Goal (Individualized)  Outcome: Ongoing, Progressing  Goal: Absence of Hospital-Acquired Illness or Injury  Outcome: Ongoing, Progressing  Goal: Optimal Comfort and Wellbeing  Outcome: Ongoing,  Progressing  Goal: Readiness for Transition of Care  Outcome: Ongoing, Progressing

## 2022-07-20 NOTE — DISCHARGE SUMMARY
[facility] Hospital Medicine Discharge Summary    Admit Date: 7/18/2022  Discharge Date and Time: 7/20/20221:23 PM  Admitting Physician: [unfilled]  Discharging Physician: Best Peña MD.  Primary Care Physician: KUSHAL Uriostegui  Consults:    Discharge Diagnoses:  Acute Encephalopathy with reported expressive aphasia   Possible TIA vs CVA   Acute Kidney Injury   NIDDM   Essential HTN   ZAID previosly on Plavix   COVID-19 Infection      Hospital Course:   82 years old female history of hypertension presented to emergency room secondary to expressive aphasia and confusion for 1 day   physical exam shows generally alert and oriented, along bilateral clear to auscultation, heart regular S1-S2, extremity no edema, bilateral arms motor 5/5, bilateral legs more 5 a 5, no slurred speech   Lab work showed WBC 70985, creatinine 1.8, baseline creatinine 1.1, , COVID-19 test was positive and patient got 2 shots of the COVID vaccine   CT shows no brain hemorrhage, a CTA of the brain shows no blockage, brain MRI was unremarkable   The patient was admitted the hospital for TIA and acute on chronic renal failure    COVID-19 infection was not treated secondary to patient is not hypoxic  Acute on chronic renal failure was treated with normal saline 125 cc/hour and creatinine improved to 1.1 from 1.8  Neurology think a transient encephalopathy secondary to toxic metabolic causes probably due to COVID-19 infection  A brain MRI was unremarkable and urinalysis was negative  The patient underwent physical therapy and occupational therapy  After 2 days of hospitalization, patient is back to baseline mental status  The patient can go home today and have a follow-up with the neurology clinic      Pt was seen and examined on the day of discharge  Vitals:  VITAL SIGNS: 24 HRS MIN & MAX LAST   Temp  Min: 97.3 °F (36.3 °C)  Max: 98.6 °F (37 °C) 98.3 °F (36.8 °C)   BP  Min: 122/65  Max: 152/85 125/76    Pulse  Min: 78  Max: 93  87   Resp  Min: 17  Max: 21 17   SpO2  Min: 94 %  Max: 99 % 99 %       Physical Exam:  General: Appears comfortable, disoriented to person, place and time   HEENT: NC/AT   Neck: No JVD   Chest: CTABL   CVS: Regular rhythm. Normal S1/S2.   Abdomen: nondistended, normoactive BS, soft and non-tender.   MSK: No obvious deformity or joint swelling   Skin: Warm and dry   Neuro: motor strength 5/5 BUE and BLE, no pronator drift, confused. Disoriented to person, place and time. Able to answer some questions appropriately   Psych: Cooperative      Procedures Performed: No admission procedures for hospital encounter.     Significant Diagnostic Studies: See Full reports for all details    Recent Labs   Lab 07/19/22  0005 07/20/22  0400   WBC 11.2 4.6   RBC 4.02* 3.73*   HGB 12.0 10.8*   HCT 35.2* 34.1*   MCV 87.6 91.4   MCH 29.9 29.0   MCHC 34.1 31.7*   RDW 12.5 12.5    123*   MPV 13.5* 13.8*       Recent Labs   Lab 07/19/22  0005 07/20/22  0400   * 137   K 4.1 4.2   CO2 24 21*   BUN 32.0* 29.5*   CREATININE 1.82* 1.47*   CALCIUM 9.2 8.7   MG  --  1.80   ALBUMIN 3.9 3.5   ALKPHOS 66 54   ALT 9 9   AST 14 22   BILITOT 0.9 0.9        Microbiology Results (last 7 days)     Procedure Component Value Units Date/Time    Blood Culture [[id number]]  (Normal) Collected: 07/19/22 1041    Order Status: Completed Specimen: Blood Updated: 07/20/22 1202     CULTURE, BLOOD ([facility]) No Growth At 24 Hours    Blood Culture [[id number]]  (Normal) Collected: 07/19/22 1041    Order Status: Completed Specimen: Blood Updated: 07/20/22 1202     CULTURE, BLOOD ([facility]) No Growth At 24 Hours           Echo  · The estimated ejection fraction is 55%.  · Normal systolic function.  · Normal left ventricular diastolic function.  · Normal right ventricular size.  · Mild-to-moderate aortic regurgitation.  · Normal central venous pressure (3 mmHg).  · The estimated PA systolic pressure is 24 mmHg.            Medication List       CONTINUE taking these medications    amLODIPine 5 MG tablet  Commonly known as: NORVASC     aspirin 81 MG EC tablet  Commonly known as: ECOTRIN     furosemide 40 MG tablet  Commonly known as: LASIX     metFORMIN 500 MG tablet  Commonly known as: GLUCOPHAGE     metoprolol tartrate 25 MG tablet  Commonly known as: LOPRESSOR     omeprazole 40 MG capsule  Commonly known as: PRILOSEC     oxybutynin 5 MG Tr24  Commonly known as: DITROPAN-XL     pantoprazole 40 MG tablet  Commonly known as: PROTONIX     prednisoLONE acetate 1 % Drps  Commonly known as: PRED FORTE     simvastatin 20 MG tablet  Commonly known as: ZOCOR     tamsulosin 0.4 mg Cap  Commonly known as: FLOMAX     VEMLIDY 25 mg Tab  Generic drug: tenofovir alafenamide             Explained in detail to the patient about the discharge plan, medications, and follow-up visits. Pt understands and agrees with the treatment plan  Discharge Disposition:    Discharged Condition: stable  Diet-   Dietary Orders (From admission, onward)     Start     Ordered    07/19/22 1037  Diet diabetic  Diet effective now         07/19/22 1037               Medications Per DC med rec  Activities as tolerated    For further questions contact hospitalist office    Discharge time 33 minutes    For worsening symptoms, chest pain, shortness of breath, increased abdominal pain, high grade fever, stroke or stroke like symptoms, immediately go to the nearest Emergency Room or call 911 as soon as possible.      Best Richmond M.D on 7/20/2022. at 1:23 PM.

## 2022-07-20 NOTE — NURSING
Patient discharged to home in stable condition via wheelchair via personal family vehicle. Accompanied by transport and patient's family member.

## 2022-07-20 NOTE — PT/OT/SLP EVAL
"Speech Language Pathology Department  Cognitive-Communication Evaluation    Patient Name:  Tonie Tejada   MRN:  [id number]  Admitting Diagnosis: AMS    Recommendations:     General Recommendations:  SLP intervention not indicated  Communication strategies:  none    Discharge recommendations:  Discharge Facility/Level of Care Needs: home   Barriers to Discharge:  None    History:     Pt admitted for CVA workup.  MRI negative for acute changed.  Pt COVID 19+    Previous level of Function   Education:"none"   Occupation: retired   Lives: with spouse and children   Handed: Right   Glasses: yes   Hearing Aids: no   Home Responsibilities: no longer drives, manages household tasks with       Subjective     Patient awake, alert and cooperative.    Patient goals: to go home     Pain/Comfort:  · Pain Rating 1: 0/10    Respiratory Status: room air    Objective:     SPEECH PRODUCTION   Phoneme Production: adequate   Voice Quality: adequate   Voice Production: adequate   Speech Rate: acceptable   Loudness: acceptable   Respiration: WFL for speech   Resonance: adequate   Prosody: appropriate   Speech Intelligibility  Known Context: 75%-90%  Unknown Context: Greater that 90%    AUDITORY COMPREHENSION  Following Directions:   1-Step: 100%   2-Step: 100%  Yes/No Questions:   Biographical: 100%   Environmental: 100%   Simple: 100%    VERBAL EXPRESSION  Automatic Speech:   Functional needs: WFL  Confrontation Naming   Objects: 100%  Wh- Questions:   Object name: 100%   Object function: 100%    COGNITION  Orientation:   Person: Independent   Place: Independent   Time: Independent   Situation: Independent  Attention:   Focused: WFL   Sustained: WFL  Pragmatics:   WFL  Memory:   Short Term: WFL   Long Term: WFL  Problem Solving   Functional simple: WFL    Assessment:     Pt presents at functional baseline for speech/language/cognitive abilities.    Plan:     Plan of Care reviewed with:  " patient, daughter   SLP Follow-Up:  No       Time Tracking:     SLP Treatment Date:   07/20/22  Speech Start Time:  1045  Speech Stop Time:  1105     Speech Total Time (min):  20 min    Billable minutes:  Evaluation of Speech Sound Production with Comprehension and Expression, 20 minutes       07/20/2022

## 2022-07-20 NOTE — PLAN OF CARE
Problem: Infection  Goal: Absence of Infection Signs and Symptoms  Outcome: Ongoing, Progressing  Intervention: Prevent or Manage Infection  Flowsheets (Taken 7/20/2022 1140)  Infection Management: aseptic technique maintained  Isolation Precautions: precautions maintained     Problem: Adjustment to Illness (Stroke, Ischemic/Transient Ischemic Attack)  Goal: Optimal Coping  Outcome: Ongoing, Progressing  Intervention: Support Psychosocial Response to Stroke  Flowsheets (Taken 7/20/2022 1140)  Supportive Measures:   active listening utilized   decision-making supported   relaxation techniques promoted   self-care encouraged  Family/Support System Care: support provided     Problem: Bowel Elimination Impaired (Stroke, Ischemic/Transient Ischemic Attack)  Goal: Effective Bowel Elimination  Outcome: Ongoing, Progressing  Intervention: Promote Effective Bowel Elimination  Flowsheets (Taken 7/20/2022 1140)  Bowel Elimination Management: sitting position facilitated     Problem: Cerebral Tissue Perfusion (Stroke, Ischemic/Transient Ischemic Attack)  Goal: Optimal Cerebral Tissue Perfusion  Outcome: Ongoing, Progressing  Intervention: Protect and Optimize Cerebral Perfusion  Flowsheets (Taken 7/20/2022 1140)  Sensory Stimulation Regulation:   care clustered   lighting decreased   quiet environment promoted  Cerebral Perfusion Promotion: blood pressure monitored  Fluid/Electrolyte Management: intravenous fluids adjusted     Problem: Cognitive Impairment (Stroke, Ischemic/Transient Ischemic Attack)  Goal: Optimal Cognitive Function  Outcome: Ongoing, Progressing  Intervention: Optimize Cognitive Function  Flowsheets (Taken 7/20/2022 1140)  Sensory Stimulation Regulation:   care clustered   lighting decreased   quiet environment promoted     Problem: Communication Impairment (Stroke, Ischemic/Transient Ischemic Attack)  Goal: Improved Communication Skills  Outcome: Ongoing, Progressing  Intervention: Optimize Communication  Skills  Flowsheets (Taken 7/20/2022 1140)  Communication Enhancement Strategies:   extra time allowed for response   verbal communication attempts encouraged     Problem: Functional Ability Impaired (Stroke, Ischemic/Transient Ischemic Attack)  Goal: Optimal Functional Ability  Outcome: Ongoing, Progressing  Intervention: Optimize Functional Ability  Flowsheets (Taken 7/20/2022 1140)  Self-Care Promotion:   independence encouraged   BADL personal routines maintained   BADL personal objects within reach     Problem: Respiratory Compromise (Stroke, Ischemic/Transient Ischemic Attack)  Goal: Effective Oxygenation and Ventilation  Outcome: Ongoing, Progressing     Problem: Sensorimotor Impairment (Stroke, Ischemic/Transient Ischemic Attack)  Goal: Improved Sensorimotor Function  Outcome: Ongoing, Progressing  Intervention: Optimize Range of Motion, Motor Control and Function  Flowsheets (Taken 7/20/2022 1140)  Range of Motion: active ROM (range of motion) encouraged  Intervention: Optimize Sensory and Perceptual Ability  Flowsheets (Taken 7/20/2022 1140)  Pressure Reduction Techniques:   frequent weight shift encouraged   heels elevated off bed   pressure points protected   weight shift assistance provided   rest period provided between sit times  Sensation Impairment Protection: cues provided for safety     Problem: Swallowing Impairment (Stroke, Ischemic/Transient Ischemic Attack)  Goal: Optimal Eating and Swallowing without Aspiration  Outcome: Ongoing, Progressing  Intervention: Optimize Eating and Swallowing  Flowsheets (Taken 7/20/2022 1140)  Aspiration Precautions:   awake/alert before oral intake   distractions minimized during oral intake  Swallowing Interventions: Dysphagia: upright position maintained 30 mins after intake     Problem: Urinary Elimination Impaired (Stroke, Ischemic/Transient Ischemic Attack)  Goal: Effective Urinary Elimination  Outcome: Ongoing, Progressing  Intervention: Promote Effective  Bladder Elimination  Flowsheets (Taken 7/20/2022 1140)  Urinary Elimination Promotion:   positioned for ease of voiding   absorbent pad/diaper use encouraged   toileting offered     Problem: Adult Inpatient Plan of Care  Goal: Plan of Care Review  Outcome: Ongoing, Progressing  Flowsheets (Taken 7/20/2022 1140)  Plan of Care Reviewed With:   patient   family  Goal: Absence of Hospital-Acquired Illness or Injury  Outcome: Ongoing, Progressing  Intervention: Identify and Manage Fall Risk  Flowsheets (Taken 7/20/2022 1140)  Safety Promotion/Fall Prevention:   assistive device/personal item within reach   Fall Risk reviewed with patient/family   Fall Risk signage in place   high risk medications identified   lighting adjusted   medications reviewed   nonskid shoes/socks when out of bed   side rails raised x 2   instructed to call staff for mobility  Intervention: Prevent Skin Injury  Flowsheets (Taken 7/20/2022 1140)  Body Position: position changed independently  Skin Protection:   incontinence pads utilized   tubing/devices free from skin contact  Intervention: Prevent and Manage VTE (Venous Thromboembolism) Risk  Flowsheets (Taken 7/20/2022 1140)  VTE Prevention/Management:   ambulation promoted   bleeding precations maintained   bleeding risk assessed   bleeding risk factor(s) identified, provider notified   dorsiflexion/plantar flexion performed   fluids promoted  Range of Motion: active ROM (range of motion) encouraged  Goal: Optimal Comfort and Wellbeing  Outcome: Ongoing, Progressing  Intervention: Monitor Pain and Promote Comfort  Flowsheets (Taken 7/20/2022 1140)  Pain Management Interventions:   pain management plan reviewed with patient/caregiver   position adjusted   relaxation techniques promoted   quiet environment facilitated  Intervention: Provide Person-Centered Care  Flowsheets (Taken 7/20/2022 1140)  Trust Relationship/Rapport:   care explained   choices provided   empathic listening provided    questions answered   emotional support provided   questions encouraged   reassurance provided   thoughts/feelings acknowledged

## 2022-07-20 NOTE — PLAN OF CARE
07/20/22 1606   Final Note   Assessment Type Final Discharge Note   Anticipated Discharge Disposition Home-Health   Post-Acute Status   Post-Acute Authorization Home Health   Home Health Status Referrals Sent  (Sent to [facility] and request for auth submitted to [facility] [facility])

## 2022-07-20 NOTE — PT/OT/SLP PROGRESS
Physical Therapy         Treatment        Tonie Tejada   MRN: [id number]     PT Received On: 07/20/22  PT Start Time: 1426     PT Stop Time: 1449    PT Total Time (min): 23 min       Billable Minutes:  Gait [id number] and Therapeutic Activity 10  Total Minutes: 23    Treatment Type: Treatment  PT/PTA: PTA     PTA Visit Number: 1       General Precautions: Standard, droplet, fall, contact, airborne (COVID +)  Orthopedic Precautions: Orthopedic Precautions : N/A   Braces:      Spiritual, Cultural Beliefs, Yarsani Practices, Values that Affect Care: no    Subjective:  Communicated with nurse prior to session.         Objective:  Patient found sitting up in chair, with      Balance:   Static Stand: FAIR+: Takes MINIMAL challenges from all directions  Dynamic stand: FAIR+: Needs CLOSE SUPERVISION during gait and is able to right self with minor LOB    Transfer Training:  Sit to stand:Stand-by Assistance with Rolling Walker x4 trials     Gait Training:  Patient gait trained   50  feet x3 trials on level tile with Rolling Walker with Stand-by Assistance.  Pt with demonstarting a  reciprocal gait with decreased bear.Impairments contributing to gait deviations include decreased strength    Activity Tolerance:  Patient tolerated treatment well    Patient left up in chair with all lines intact and call button in reach.    Assessment:  Tonie Tejada is a 81 y.o. female with a medical diagnosis of Expressive aphasia. She presents with increased gait training.    Rehab potential is good.    Activity tolerance: Good    Discharge recommendations: Discharge Facility/Level of Care Needs: home     Equipment recommendations:       GOALS:   Multidisciplinary Problems     Physical Therapy Goals        Problem: Physical Therapy    Goal Priority Disciplines Outcome Goal Variances Interventions   Physical Therapy Goal     PT, PT/OT Ongoing, Progressing     Description: Goals to be met by: 8/19/22     Patient will increase  functional independence with mobility by performin. Supine to sit with Set-up [place]  2. Sit to supine with Set-up [place]  3. Sit to stand transfer with Supervision  4. Gait  x 300 feet with Supervision using Rolling Walker.   5. 5. Patient will ascend/descend 4 steps with CGA                     PLAN:    Patient to be seen 5 x/week  to address the above listed problems via gait training, therapeutic activities  Plan of Care expires: 22  Plan of Care reviewed with: patient, daughter         2022

## 2022-07-21 LAB
LEFT CCA DIST DIAS: 0 CM/S
LEFT CCA DIST SYS: 85 CM/S
LEFT CCA PROX DIAS: 11 CM/S
LEFT CCA PROX SYS: 91 CM/S
LEFT ECA DIAS: 0 CM/S
LEFT ECA SYS: 54 CM/S
LEFT ICA DIST DIAS: 11 CM/S
LEFT ICA DIST SYS: 75 CM/S
LEFT ICA MID DIAS: 15 CM/S
LEFT ICA MID SYS: 96 CM/S
LEFT ICA PROX DIAS: 10 CM/S
LEFT ICA PROX SYS: 62 CM/S
LEFT VERTEBRAL SYS: 38 CM/S
OHS CV CAROTID RIGHT ICA EDV HIGHEST: 9
OHS CV CAROTID ULTRASOUND LEFT ICA/CCA RATIO: 1.13
OHS CV CAROTID ULTRASOUND RIGHT ICA/CCA RATIO: 0.79
OHS CV PV CAROTID LEFT HIGHEST CCA: 91
OHS CV PV CAROTID LEFT HIGHEST ICA: 96
OHS CV PV CAROTID RIGHT HIGHEST CCA: 73
OHS CV PV CAROTID RIGHT HIGHEST ICA: 58
OHS CV US CAROTID LEFT HIGHEST EDV: 15
PROCALCITONIN SERPL-MCNC: 0.22 NG/ML
RIGHT CCA DIST DIAS: 12 CM/S
RIGHT CCA DIST SYS: 73 CM/S
RIGHT CCA PROX DIAS: 8 CM/S
RIGHT CCA PROX SYS: 73 CM/S
RIGHT ECA DIAS: 6 CM/S
RIGHT ECA SYS: 90 CM/S
RIGHT ICA DIST DIAS: 9 CM/S
RIGHT ICA DIST SYS: 46 CM/S
RIGHT ICA MID DIAS: 8 CM/S
RIGHT ICA MID SYS: 50 CM/S
RIGHT ICA PROX DIAS: 9 CM/S
RIGHT ICA PROX SYS: 58 CM/S
RIGHT VERTEBRAL SYS: 38 CM/S

## 2022-07-24 LAB — BACTERIA BLD CULT: NORMAL

## 2022-07-26 LAB
BACTERIA BLD CULT: ABNORMAL
GRAM STN SPEC: ABNORMAL

## 2022-08-17 PROBLEM — I10 HTN (HYPERTENSION): Status: ACTIVE | Noted: 2022-08-17

## 2022-08-17 PROBLEM — K21.9 GERD (GASTROESOPHAGEAL REFLUX DISEASE): Status: ACTIVE | Noted: 2022-08-17

## 2022-08-17 PROBLEM — E11.9 DIABETES MELLITUS: Status: ACTIVE | Noted: 2022-08-17

## 2022-08-17 PROBLEM — E78.5 HLD (HYPERLIPIDEMIA): Status: ACTIVE | Noted: 2022-08-17

## 2022-08-17 PROBLEM — H26.9 CATARACTS, BILATERAL: Status: ACTIVE | Noted: 2022-08-17

## 2022-09-02 ENCOUNTER — EXTERNAL HOME HEALTH (OUTPATIENT)
Dept: HOME HEALTH SERVICES | Facility: HOSPITAL | Age: 82
End: 2022-09-02
Payer: MEDICARE

## 2022-09-07 ENCOUNTER — PROCEDURE VISIT (OUTPATIENT)
Dept: RESPIRATORY THERAPY | Facility: HOSPITAL | Age: 82
End: 2022-09-07
Attending: INTERNAL MEDICINE
Payer: MEDICARE

## 2022-09-07 ENCOUNTER — DOCUMENT SCAN (OUTPATIENT)
Dept: HOME HEALTH SERVICES | Facility: HOSPITAL | Age: 82
End: 2022-09-07
Payer: MEDICARE

## 2022-09-07 VITALS — OXYGEN SATURATION: 96 % | HEART RATE: 72 BPM | RESPIRATION RATE: 16 BRPM

## 2022-09-07 DIAGNOSIS — R06.02 SOB (SHORTNESS OF BREATH): Primary | ICD-10-CM

## 2022-09-07 LAB
DLCO ADJ PRE: 11.79 ML/(MIN*MMHG) (ref 11.31–22.78)
DLCO SINGLE BREATH LLN: 11.31
DLCO SINGLE BREATH PRE REF: 69.2 %
DLCO SINGLE BREATH REF: 17.05
DLCOC SBVA LLN: 2.34
DLCOC SBVA PRE REF: 102.8 %
DLCOC SBVA REF: 3.96
DLCOC SINGLE BREATH LLN: 11.31
DLCOC SINGLE BREATH PRE REF: 69.2 %
DLCOC SINGLE BREATH REF: 17.05
DLCOCSBVAULN: 5.57
DLCOCSINGLEBREATHULN: 22.78
DLCOSINGLEBREATHULN: 22.78
DLCOVA LLN: 2.34
DLCOVA PRE REF: 102.8 %
DLCOVA PRE: 4.07 ML/(MIN*MMHG*L) (ref 2.34–5.57)
DLCOVA REF: 3.96
DLCOVAULN: 5.57
DLVAADJ PRE: 4.07 ML/(MIN*MMHG*L) (ref 2.34–5.57)
ERV LLN: -16449.59
ERV PRE REF: 40.4 %
ERV REF: 0.41
ERVULN: ABNORMAL
FEF 25 75 LLN: 0.38
FEF 25 75 PRE REF: 75.3 %
FEF 25 75 REF: 1.19
FET100 CHG: 7.7 %
FEV1 CHG: 2.6 %
FEV1 FVC LLN: 63
FEV1 FVC PRE REF: 97.1 %
FEV1 FVC REF: 78
FEV1 LLN: 0.92
FEV1 PRE REF: 81.1 %
FEV1 REF: 1.41
FEV1 VOL CHG: 0.03
FRCPLETH LLN: 1.69
FRCPLETH PREREF: 134.8 %
FRCPLETH REF: 2.51
FRCPLETHULN: 3.33
FVC CHG: -1.4 %
FVC LLN: 1.22
FVC PRE REF: 82.6 %
FVC REF: 1.83
FVC VOL CHG: -0.02
IVC PRE: 1.53 L (ref 1.22–2.45)
IVC SINGLE BREATH LLN: 1.22
IVC SINGLE BREATH PRE REF: 83.6 %
IVC SINGLE BREATH REF: 1.83
IVCSINGLEBREATHULN: 2.45
LLN IC: -16448.56
MVV LLN: 47
MVV PRE REF: 78.6 %
MVV REF: 55
PEF LLN: 1.24
PEF PRE REF: 133.9 %
PEF REF: 3.02
POST FEF 25 75: 1.13 L/S (ref 0.38–2.01)
POST FET 100: 9.48 SEC
POST FEV1 FVC: 78.45 % (ref 62.87–92.36)
POST FEV1: 1.17 L (ref 0.92–1.89)
POST FVC: 1.49 L (ref 1.22–2.45)
POST PEF: 4.24 L/S (ref 1.24–4.79)
PRE DLCO: 11.79 ML/(MIN*MMHG) (ref 11.31–22.78)
PRE ERV: 0.17 L (ref -16449.59–16450.41)
PRE FEF 25 75: 0.9 L/S (ref 0.38–2.01)
PRE FET 100: 8.8 SEC
PRE FEV1 FVC: 75.38 % (ref 62.87–92.36)
PRE FEV1: 1.14 L (ref 0.92–1.89)
PRE FRC PL: 3.38 L
PRE FVC: 1.51 L (ref 1.22–2.45)
PRE IC: 1.22 L (ref -16448.56–16451.44)
PRE MVV: 43 L/MIN (ref 46.53–62.95)
PRE PEF: 4.04 L/S (ref 1.24–4.79)
PRE REF IC: 84.7 %
PRE RV: 2.92 L (ref 1.52–2.67)
PRE TLC: 4.61 L (ref 3.32–5.29)
PRE VTG: 3.72 L
RAW PRE REF: 61.3 %
RAW PRE: 1.88 CMH2O*S/L (ref 3.06–3.06)
RAW REF: 3.06
REF IC: 1.44
RV LLN: 1.52
RV PRE REF: 139.3 %
RV REF: 2.1
RVTLC LLN: 38
RVTLC PRE REF: 134.4 %
RVTLC PRE: 63.41 % (ref 37.59–56.77)
RVTLC REF: 47
RVTLCULN: 57
RVULN: 2.67
SGAW PRE REF: 154.5 %
SGAW PRE: 0.16 1/(CMH2O*S) (ref 0.1–0.1)
SGAW REF: 0.1
TLC LLN: 3.32
TLC PRE REF: 107 %
TLC REF: 4.31
TLC ULN: 5.29
ULN IC: ABNORMAL
VA PRE: 2.9 L (ref 4.16–4.16)
VA SINGLE BREATH PRE REF: 69.8 %
VA SINGLE BREATH REF: 4.16
VC LLN: 1.22
VC PRE REF: 91.9 %
VC PRE: 1.69 L (ref 1.22–2.45)
VC REF: 1.83
VC ULN: 2.45

## 2022-09-07 PROCEDURE — 94729 DIFFUSING CAPACITY: CPT

## 2022-09-07 PROCEDURE — 94727 GAS DIL/WSHOT DETER LNG VOL: CPT

## 2022-09-07 PROCEDURE — 94761 N-INVAS EAR/PLS OXIMETRY MLT: CPT

## 2022-09-07 PROCEDURE — 25000242 PHARM REV CODE 250 ALT 637 W/ HCPCS: Performed by: INTERNAL MEDICINE

## 2022-09-07 PROCEDURE — 94060 EVALUATION OF WHEEZING: CPT

## 2022-09-07 RX ORDER — ALBUTEROL SULFATE 0.83 MG/ML
2.5 SOLUTION RESPIRATORY (INHALATION)
Status: COMPLETED | OUTPATIENT
Start: 2022-09-07 | End: 2022-09-07

## 2022-09-07 RX ADMIN — ALBUTEROL SULFATE 2.5 MG: 2.5 SOLUTION RESPIRATORY (INHALATION) at 11:09

## 2022-09-29 LAB
B PARAP IS1001 DNA CT SPEC QN NAA+PROBE: NORMAL
B PERT+PARAP PTXS1 CT SPEC QN NAA+PROBE: NORMAL
CHLAMYDIA SP IGG+IGM PNL TITR SER IF: NORMAL
FLUAV AG UPPER RESP QL IA.RAPID: NORMAL
FLUAV H1 2009 RNA SPEC NAA+PROBE-IMP: NORMAL
FLUAV H3 HA GENE NPH QL NAA+PROBE: NORMAL
FLUBV AG UPPER RESP QL IA.RAPID: NORMAL
HADV DNA NPH QL NAA+NON-PROBE: NORMAL
HCOV 229E+OC43 RNA NPH QL NAA+PROBE: NORMAL
HCOV HKU1 RNA SPEC QL NAA+PROBE: NORMAL
HCOV NL63 RNA SPEC QL NAA+PROBE: NORMAL
HCOV OC43 RNA SPEC QL NAA+PROBE: NORMAL
HMPV RNA SPEC QL NAA+PROBE: NORMAL
HPIV1 F GENE NPH QL NAA+PROBE: NORMAL
HPIV2 L GENE NPH QL NAA+PROBE: NORMAL
HPIV3 NP GENE NPH QL NAA+PROBE: NORMAL
HPIV4 P GENE NPH QL NAA+PROBE: NORMAL
M PNEUMO IGA SER-ACNC: NORMAL
RHINOVIRUS RNA SPEC NAA+PROBE: NORMAL
RSV A 5' UTR RNA NPH QL NAA+PROBE: NORMAL

## 2022-09-30 ENCOUNTER — DOCUMENT SCAN (OUTPATIENT)
Dept: HOME HEALTH SERVICES | Facility: HOSPITAL | Age: 82
End: 2022-09-30
Payer: MEDICARE

## 2022-10-06 ENCOUNTER — HOSPITAL ENCOUNTER (EMERGENCY)
Facility: HOSPITAL | Age: 82
Discharge: HOME | End: 2022-10-06
Attending: EMERGENCY MEDICINE
Payer: MEDICARE

## 2022-10-06 VITALS
SYSTOLIC BLOOD PRESSURE: 154 MMHG | DIASTOLIC BLOOD PRESSURE: 79 MMHG | TEMPERATURE: 98 F | HEART RATE: 63 BPM | OXYGEN SATURATION: 100 % | RESPIRATION RATE: 15 BRPM

## 2022-10-06 DIAGNOSIS — R91.1 PULMONARY NODULE: Primary | ICD-10-CM

## 2022-10-06 DIAGNOSIS — R06.02 SOB (SHORTNESS OF BREATH): ICD-10-CM

## 2022-10-06 LAB
ALBUMIN SERPL-MCNC: 4.1 GM/DL (ref 3.4–4.8)
ALBUMIN/GLOB SERPL: 1.1 RATIO (ref 1.1–2)
ALP SERPL-CCNC: 66 UNIT/L (ref 40–150)
ALT SERPL-CCNC: 8 UNIT/L (ref 0–55)
APTT PPP: 25.9 SECONDS (ref 23.2–33.7)
AST SERPL-CCNC: 15 UNIT/L (ref 5–34)
BASOPHILS # BLD AUTO: 0.08 X10(3)/MCL (ref 0–0.2)
BASOPHILS NFR BLD AUTO: 0.8 %
BILIRUBIN DIRECT+TOT PNL SERPL-MCNC: 0.8 MG/DL
BNP BLD-MCNC: 198.8 PG/ML
BUN SERPL-MCNC: 25.5 MG/DL (ref 9.8–20.1)
CALCIUM SERPL-MCNC: 9.5 MG/DL (ref 8.4–10.2)
CHLORIDE SERPL-SCNC: 103 MMOL/L (ref 98–107)
CO2 SERPL-SCNC: 29 MMOL/L (ref 23–31)
CREAT SERPL-MCNC: 1.56 MG/DL (ref 0.55–1.02)
EOSINOPHIL # BLD AUTO: 0.22 X10(3)/MCL (ref 0–0.9)
EOSINOPHIL NFR BLD AUTO: 2.2 %
ERYTHROCYTE [DISTWIDTH] IN BLOOD BY AUTOMATED COUNT: 12.2 % (ref 11.5–17)
FLUAV AG UPPER RESP QL IA.RAPID: NOT DETECTED
FLUBV AG UPPER RESP QL IA.RAPID: NOT DETECTED
GFR SERPLBLD CREATININE-BSD FMLA CKD-EPI: 33 MLS/MIN/1.73/M2
GLOBULIN SER-MCNC: 3.7 GM/DL (ref 2.4–3.5)
GLUCOSE SERPL-MCNC: 89 MG/DL (ref 82–115)
HCT VFR BLD AUTO: 36.1 % (ref 37–47)
HGB BLD-MCNC: 11.6 GM/DL (ref 12–16)
IMM GRANULOCYTES # BLD AUTO: 0.05 X10(3)/MCL (ref 0–0.04)
IMM GRANULOCYTES NFR BLD AUTO: 0.5 %
INR BLD: 1.09 (ref 0–1.3)
LYMPHOCYTES # BLD AUTO: 2.1 X10(3)/MCL (ref 0.6–4.6)
LYMPHOCYTES NFR BLD AUTO: 20.8 %
MCH RBC QN AUTO: 28.9 PG (ref 27–31)
MCHC RBC AUTO-ENTMCNC: 32.1 MG/DL (ref 33–36)
MCV RBC AUTO: 90 FL (ref 80–94)
MONOCYTES # BLD AUTO: 1.07 X10(3)/MCL (ref 0.1–1.3)
MONOCYTES NFR BLD AUTO: 10.6 %
NEUTROPHILS # BLD AUTO: 6.6 X10(3)/MCL (ref 2.1–9.2)
NEUTROPHILS NFR BLD AUTO: 65.1 %
NRBC BLD AUTO-RTO: 0 %
PLATELET # BLD AUTO: 202 X10(3)/MCL (ref 130–400)
PMV BLD AUTO: 13.7 FL (ref 7.4–10.4)
POTASSIUM SERPL-SCNC: 4 MMOL/L (ref 3.5–5.1)
PROT SERPL-MCNC: 7.8 GM/DL (ref 5.8–7.6)
PROTHROMBIN TIME: 14 SECONDS (ref 12.5–14.5)
RBC # BLD AUTO: 4.01 X10(6)/MCL (ref 4.2–5.4)
SARS-COV-2 RNA RESP QL NAA+PROBE: NOT DETECTED
SODIUM SERPL-SCNC: 140 MMOL/L (ref 136–145)
TROPONIN I SERPL-MCNC: <0.01 NG/ML (ref 0–0.04)
WBC # SPEC AUTO: 10.1 X10(3)/MCL (ref 4.5–11.5)

## 2022-10-06 PROCEDURE — 99285 EMERGENCY DEPT VISIT HI MDM: CPT | Mod: 25

## 2022-10-06 PROCEDURE — 36415 COLL VENOUS BLD VENIPUNCTURE: CPT | Performed by: NURSE PRACTITIONER

## 2022-10-06 PROCEDURE — 25000003 PHARM REV CODE 250: Performed by: PHYSICIAN ASSISTANT

## 2022-10-06 PROCEDURE — 93010 EKG 12-LEAD: ICD-10-PCS | Mod: ,,, | Performed by: INTERNAL MEDICINE

## 2022-10-06 PROCEDURE — 96360 HYDRATION IV INFUSION INIT: CPT | Mod: 59

## 2022-10-06 PROCEDURE — 25500020 PHARM REV CODE 255: Performed by: PHYSICIAN ASSISTANT

## 2022-10-06 PROCEDURE — 93005 ELECTROCARDIOGRAM TRACING: CPT

## 2022-10-06 PROCEDURE — 80053 COMPREHEN METABOLIC PANEL: CPT | Performed by: NURSE PRACTITIONER

## 2022-10-06 PROCEDURE — 85610 PROTHROMBIN TIME: CPT | Performed by: NURSE PRACTITIONER

## 2022-10-06 PROCEDURE — 84484 ASSAY OF TROPONIN QUANT: CPT | Performed by: NURSE PRACTITIONER

## 2022-10-06 PROCEDURE — 0241U COVID/FLU A&B PCR: CPT | Performed by: NURSE PRACTITIONER

## 2022-10-06 PROCEDURE — 85025 COMPLETE CBC W/AUTO DIFF WBC: CPT | Performed by: NURSE PRACTITIONER

## 2022-10-06 PROCEDURE — 93010 ELECTROCARDIOGRAM REPORT: CPT | Mod: ,,, | Performed by: INTERNAL MEDICINE

## 2022-10-06 PROCEDURE — 85730 THROMBOPLASTIN TIME PARTIAL: CPT | Performed by: NURSE PRACTITIONER

## 2022-10-06 PROCEDURE — 83880 ASSAY OF NATRIURETIC PEPTIDE: CPT | Performed by: NURSE PRACTITIONER

## 2022-10-06 RX ORDER — DOXYCYCLINE 100 MG/1
100 CAPSULE ORAL 2 TIMES DAILY
Qty: 20 CAPSULE | Refills: 0 | Status: SHIPPED | OUTPATIENT
Start: 2022-10-06 | End: 2022-10-16

## 2022-10-06 RX ADMIN — IOPAMIDOL 100 ML: 755 INJECTION, SOLUTION INTRAVENOUS at 06:10

## 2022-10-06 RX ADMIN — SODIUM CHLORIDE 1000 ML: 9 INJECTION, SOLUTION INTRAVENOUS at 04:10

## 2022-10-06 NOTE — ED PROVIDER NOTES
Encounter Date: 10/6/2022       History     Chief Complaint   Patient presents with    Cough     Pt to ER via POV for cough.  Also weakness and fever.  Started last week.  Spit up blood today     82-year-old fever presents to ED for evaluation of cough, congestion, generalized fatigue and fever over the last week.  Patient reports that she has been having subjective fevers at home.  Reports to coughing up blood today.  Denies any shortness of breath or chest pain.  Denies any use of blood thinners.    The history is provided by the patient. No  was used.   Review of patient's allergies indicates:  No Known Allergies  Past Medical History:   Diagnosis Date    DM (diabetes mellitus)     HLD (hyperlipidemia)     HTN (hypertension)     TIA (transient ischemic attack)      Past Surgical History:   Procedure Laterality Date     SECTION      HYSTERECTOMY       No family history on file.  Social History     Tobacco Use    Smoking status: Former    Smokeless tobacco: Never    Tobacco comments:     states a pack would last a week, quit 50 yrs ago     Review of Systems   Constitutional:  Positive for fatigue and fever. Negative for chills.   HENT:  Positive for congestion. Negative for sore throat.    Respiratory:  Positive for cough. Negative for shortness of breath and wheezing.         Hemoptysis   Cardiovascular:  Negative for chest pain, palpitations and leg swelling.   Gastrointestinal:  Negative for abdominal pain, diarrhea, nausea and vomiting.   Genitourinary: Negative.    Musculoskeletal: Negative.    Neurological:  Negative for dizziness, light-headedness and headaches.   All other systems reviewed and are negative.    Physical Exam     Initial Vitals [10/06/22 1258]   BP Pulse Resp Temp SpO2   133/71 65 18 98.4 °F (36.9 °C) 98 %      MAP       --         Physical Exam    Vitals reviewed.  Constitutional: She appears well-developed.   HENT:   Head: Normocephalic and atraumatic.   Right  Ear: Tympanic membrane and external ear normal.   Left Ear: Tympanic membrane and external ear normal.   Mouth/Throat: Uvula is midline and oropharynx is clear and moist. No uvula swelling. No oropharyngeal exudate, posterior oropharyngeal edema or posterior oropharyngeal erythema.   Eyes: Conjunctivae are normal. Pupils are equal, round, and reactive to light.   Neck: Neck supple.   Normal range of motion.  Cardiovascular:  Normal rate, regular rhythm and normal heart sounds.           Pulmonary/Chest: Breath sounds normal. She has no wheezes. She has no rhonchi. She has no rales.   Abdominal: Abdomen is soft. Bowel sounds are normal. There is no abdominal tenderness.   Musculoskeletal:         General: Normal range of motion.      Cervical back: Normal range of motion and neck supple.     Neurological: She is alert and oriented to person, place, and time. She has normal strength. No sensory deficit. GCS score is 15. GCS eye subscore is 4. GCS verbal subscore is 5. GCS motor subscore is 6.   Skin: Skin is warm and dry.   Psychiatric: She has a normal mood and affect.       ED Course   Procedures  Labs Reviewed   CBC WITH DIFFERENTIAL - Abnormal; Notable for the following components:       Result Value    RBC 4.01 (*)     Hgb 11.6 (*)     Hct 36.1 (*)     MCHC 32.1 (*)     MPV 13.7 (*)     IG# 0.05 (*)     All other components within normal limits   COMPREHENSIVE METABOLIC PANEL - Abnormal; Notable for the following components:    Blood Urea Nitrogen 25.5 (*)     Creatinine 1.56 (*)     Protein Total 7.8 (*)     Globulin 3.7 (*)     All other components within normal limits   B-TYPE NATRIURETIC PEPTIDE - Abnormal; Notable for the following components:    Natriuretic Peptide 198.8 (*)     All other components within normal limits   TROPONIN I - Normal   COVID/FLU A&B PCR - Normal   PROTIME-INR - Normal   APTT - Normal     EKG Readings: (Independently Interpreted)   Initial Reading: No STEMI. Previous EKG: Compared  with most recent EKG Previous EKG Date: 7/18/22. Rhythm: Normal Sinus Rhythm. Heart Rate: 67. Ectopy: No Ectopy. Conduction: RBBB. ST Segments: Normal ST Segments. T Waves: Normal. T Waves Flipped: II and V2. Clinical Impression: Normal Sinus Rhythm with RBBB Other Impression: Normal sinus rhythm with right bundle-branch block consistent with previous EKG, no STEMI noted     Imaging Results              CTA Chest Non-Coronary (PE Studies) (Final result)  Result time 10/06/22 18:47:33      Final result by Terrance Acosta MD (10/06/22 18:47:33)                   Impression:      1.  No pulmonary embolism identified.    2.  No acute consolidation.    3.  Right upper lung lobe two adjacent ground-glass nodules may be inflammatory or infectious.  Possibility of neoplasm is less likely but not entirely excluded.  Follow-up CT chest is recommended in 3 months.      Electronically signed by: Terrance Acosta  Date:    10/06/2022  Time:    18:47               Narrative:    EXAMINATION:  CTA CHEST NON CORONARY (PE STUDIES)    CLINICAL HISTORY:  Pulmonary embolism (PE) suspected, high prob;    TECHNIQUE:  Sequential axial images performed of the chest using pulmonary embolism protocol following intravenous contrast bolus. Sagittal and contrast reformations performed.    Dose product length of   mGycm. Automated exposure control was utilized to minimize radiation dose.    FINDINGS:  Optimal contrast bolus timing with adequately opacified pulmonary artery system is without evidence of filling defects from pulmonary thromboembolism within the main pulmonary artery and the major branches. Thoracic aorta is without aneurysmal dilatation dissection with size is within normal limits.  Chest lymph nodes are not enlarged in size.    Within the right upper lung lobe are two adjacent groundglass nodules measuring 5 mm on image 23 and 6 mm on image 24 series 13.  Bilateral lungs dependent hypoventilatory changes.  There is no acute  consolidation, pneumonitis or cavitary abnormality.  No fluid within the pleural or the pericardial spaces.                                       X-Ray Chest PA And Lateral (Final result)  Result time 10/06/22 13:30:00      Final result by Terrance Acosta MD (10/06/22 13:30:00)                   Impression:      No acute cardiopulmonary process identified.      Electronically signed by: Terrance Acosta  Date:    10/06/2022  Time:    13:30               Narrative:    EXAMINATION:  XR CHEST PA AND LATERAL    CLINICAL HISTORY:  sob;    TECHNIQUE:  Two-view    COMPARISON:  July 19, 2022.    FINDINGS:  Cardiopericardial silhouette is within normal limits. Lungs are without dense focal or segmental consolidation, congestion, pleural effusion or pneumothorax.                                       Medications   sodium chloride 0.9% bolus 1,000 mL (0 mLs Intravenous Stopped 10/6/22 1730)   iopamidoL (ISOVUE-370) injection 100 mL (100 mLs Intravenous Given 10/6/22 1839)     Medical Decision Making:   ED Management:  81 yo female presents to ED for evaluation of cough congestion and fever for the past week. CXR negative. CT obtained due to hemoptysis. CT showing nodule infectious vs mass. Will placed on antibiotics. Patient SpO2 100%. Afebrile here. Discussed with patient need for follow up for repeat CT scan. Patient verbalized understanding and agrees with plan of care.                         Clinical Impression:   Final diagnoses:  [R06.02] SOB (shortness of breath)  [R91.1] Pulmonary nodule (Primary)        ED Disposition Condition    Discharge Stable          ED Prescriptions       Medication Sig Dispense Start Date End Date Auth. Provider    doxycycline (VIBRAMYCIN) 100 MG Cap Take 1 capsule (100 mg total) by mouth 2 (two) times daily. for 10 days 20 capsule 10/6/2022 10/16/2022 LORRAINE Babin          Follow-up Information       Follow up With Specialties Details Why Contact Info    [facility]    [address]  [facility] [address]  [phone]               LORRAINE Babin  10/06/22 2054

## 2022-10-06 NOTE — FIRST PROVIDER EVALUATION
Medical screening examination initiated.  I have conducted a focused provider triage encounter, findings are as follows:    Brief history of present illness:  83 y/o female who presents with cough, fever, weakness. +coughing up blood today.     There were no vitals filed for this visit.    Pertinent physical exam:  alert, nonlabored, ambulatory    Brief workup plan:  labs, swab, xray    Preliminary workup initiated; this workup will be continued and followed by the physician or advanced practice provider that is assigned to the patient when roomed.

## 2022-10-07 NOTE — DISCHARGE INSTRUCTIONS
Take full course of antibiotics. Follow up with PCP for re-evaluation for possible repeat CT scan.

## 2022-10-07 NOTE — ED NOTES
Pt Daughter, Zahra notified pt is ready for D/C. Medications and Instructions reviewed with family. V/U. Will send ride to ER for transport home.

## 2022-10-11 ENCOUNTER — DOCUMENT SCAN (OUTPATIENT)
Dept: HOME HEALTH SERVICES | Facility: HOSPITAL | Age: 82
End: 2022-10-11
Payer: MEDICARE

## 2022-12-09 RX ORDER — TENOFOVIR ALAFENAMIDE 25 MG/1
TABLET ORAL
Qty: 90 TABLET | Refills: 0 | Status: SHIPPED | OUTPATIENT
Start: 2022-12-09 | End: 2023-01-31 | Stop reason: SDUPTHER

## 2022-12-09 NOTE — TELEPHONE ENCOUNTER
----- Message from Светлана Narayanan sent at 12/9/2022 12:44 PM CST -----  Regarding: rx refill  #ANTHONY/DAVID#  Pt daughter, skip, called clinic to reschedule missed appt. Also, to request refill of pt's hep c medication(s). [phone]

## 2023-01-31 ENCOUNTER — PROCEDURE VISIT (OUTPATIENT)
Dept: INFECTIOUS DISEASES | Facility: CLINIC | Age: 83
End: 2023-01-31
Payer: MEDICARE

## 2023-01-31 ENCOUNTER — OFFICE VISIT (OUTPATIENT)
Dept: INFECTIOUS DISEASES | Facility: CLINIC | Age: 83
End: 2023-01-31
Payer: MEDICARE

## 2023-01-31 DIAGNOSIS — Z23 NEED FOR VACCINATION: ICD-10-CM

## 2023-01-31 DIAGNOSIS — R40.0 SOMNOLENCE: ICD-10-CM

## 2023-01-31 DIAGNOSIS — G60.3 IDIOPATHIC PROGRESSIVE NEUROPATHY: ICD-10-CM

## 2023-01-31 DIAGNOSIS — Z53.20 FUNCTIONAL ASSESSMENT DECLINED: ICD-10-CM

## 2023-01-31 DIAGNOSIS — B18.1 CHRONIC HEPATITIS B: Primary | ICD-10-CM

## 2023-01-31 DIAGNOSIS — E46 PROTEIN-CALORIE MALNUTRITION, UNSPECIFIED SEVERITY: ICD-10-CM

## 2023-01-31 DIAGNOSIS — E08.69 DIABETES MELLITUS DUE TO UNDERLYING CONDITION WITH OTHER SPECIFIED COMPLICATION: ICD-10-CM

## 2023-01-31 LAB
ALBUMIN SERPL-MCNC: 4.2 G/DL (ref 3.4–4.8)
ALBUMIN/GLOB SERPL: 1.1 RATIO (ref 1.1–2)
ALP SERPL-CCNC: 65 UNIT/L (ref 40–150)
ALT SERPL-CCNC: 9 UNIT/L (ref 0–55)
AMPHET UR QL SCN: NEGATIVE
AST SERPL-CCNC: 20 UNIT/L (ref 5–34)
BARBITURATE SCN PRESENT UR: NEGATIVE
BASOPHILS # BLD AUTO: 0.05 X10(3)/MCL (ref 0–0.2)
BASOPHILS NFR BLD AUTO: 0.7 %
BENZODIAZ UR QL SCN: NEGATIVE
BILIRUBIN DIRECT+TOT PNL SERPL-MCNC: 0.6 MG/DL
BUN SERPL-MCNC: 21.6 MG/DL (ref 9.8–20.1)
CALCIUM SERPL-MCNC: 10 MG/DL (ref 8.4–10.2)
CANNABINOIDS UR QL SCN: NEGATIVE
CHLORIDE SERPL-SCNC: 107 MMOL/L (ref 98–107)
CO2 SERPL-SCNC: 25 MMOL/L (ref 23–31)
COCAINE UR QL SCN: NEGATIVE
CREAT SERPL-MCNC: 1.23 MG/DL (ref 0.55–1.02)
EOSINOPHIL # BLD AUTO: 0.16 X10(3)/MCL (ref 0–0.9)
EOSINOPHIL NFR BLD AUTO: 2.2 %
ERYTHROCYTE [DISTWIDTH] IN BLOOD BY AUTOMATED COUNT: 12.4 % (ref 11.5–17)
FENTANYL UR QL SCN: NEGATIVE
FOLATE SERPL-MCNC: 10.8 NG/ML (ref 7–31.4)
GFR SERPLBLD CREATININE-BSD FMLA CKD-EPI: 44 MLS/MIN/1.73/M2
GLOBULIN SER-MCNC: 3.7 GM/DL (ref 2.4–3.5)
GLUCOSE SERPL-MCNC: 120 MG/DL (ref 82–115)
HBV SURFACE AG SERPL QL IA: REACTIVE
HBV SURFACE AG SERPLBLD QL IA.RAPID: NORMAL
HCT VFR BLD AUTO: 35.8 % (ref 37–47)
HCV AB SERPL QL IA: NONREACTIVE
HGB BLD-MCNC: 11.6 GM/DL (ref 12–16)
HIV 1+2 AB+HIV1 P24 AG SERPL QL IA: NONREACTIVE
IMM GRANULOCYTES # BLD AUTO: 0.03 X10(3)/MCL (ref 0–0.04)
IMM GRANULOCYTES NFR BLD AUTO: 0.4 %
LYMPHOCYTES # BLD AUTO: 1.62 X10(3)/MCL (ref 0.6–4.6)
LYMPHOCYTES NFR BLD AUTO: 22.8 %
MCH RBC QN AUTO: 29.2 PG
MCHC RBC AUTO-ENTMCNC: 32.4 MG/DL (ref 33–36)
MCV RBC AUTO: 90.2 FL (ref 80–94)
MDMA UR QL SCN: NEGATIVE
MONOCYTES # BLD AUTO: 0.59 X10(3)/MCL (ref 0.1–1.3)
MONOCYTES NFR BLD AUTO: 8.3 %
NEUTROPHILS # BLD AUTO: 4.67 X10(3)/MCL (ref 2.1–9.2)
NEUTROPHILS NFR BLD AUTO: 65.6 %
NRBC BLD AUTO-RTO: 0 %
OPIATES UR QL SCN: NEGATIVE
PCP UR QL: NEGATIVE
PH UR: 6 [PH] (ref 3–11)
PLATELET # BLD AUTO: 169 X10(3)/MCL (ref 130–400)
PLATELETS.RETICULATED NFR BLD AUTO: 14.1 % (ref 0.9–11.2)
PMV BLD AUTO: 13.9 FL (ref 7.4–10.4)
POTASSIUM SERPL-SCNC: 5.4 MMOL/L (ref 3.5–5.1)
PROT SERPL-MCNC: 7.9 GM/DL (ref 5.8–7.6)
RBC # BLD AUTO: 3.97 X10(6)/MCL (ref 4.2–5.4)
SODIUM SERPL-SCNC: 142 MMOL/L (ref 136–145)
T PALLIDUM AB SER QL: REACTIVE
TSH SERPL-ACNC: 1.58 UIU/ML (ref 0.35–4.94)
VIT B12 SERPL-MCNC: 520 PG/ML (ref 213–816)
WBC # SPEC AUTO: 7.1 X10(3)/MCL (ref 4.5–11.5)

## 2023-01-31 PROCEDURE — 99215 OFFICE O/P EST HI 40 MIN: CPT | Mod: PBBFAC | Performed by: NURSE PRACTITIONER

## 2023-01-31 PROCEDURE — 91200 LIVER ELASTOGRAPHY: CPT | Mod: PBBFAC | Performed by: INTERNAL MEDICINE

## 2023-01-31 PROCEDURE — 1160F PR REVIEW ALL MEDS BY PRESCRIBER/CLIN PHARMACIST DOCUMENTED: ICD-10-PCS | Mod: CPTII,,, | Performed by: NURSE PRACTITIONER

## 2023-01-31 PROCEDURE — 84443 ASSAY THYROID STIM HORMONE: CPT | Performed by: NURSE PRACTITIONER

## 2023-01-31 PROCEDURE — 99214 PR OFFICE/OUTPT VISIT, EST, LEVL IV, 30-39 MIN: ICD-10-PCS | Mod: S$PBB,,, | Performed by: NURSE PRACTITIONER

## 2023-01-31 PROCEDURE — 82746 ASSAY OF FOLIC ACID SERUM: CPT | Performed by: NURSE PRACTITIONER

## 2023-01-31 PROCEDURE — 87389 HIV-1 AG W/HIV-1&-2 AB AG IA: CPT | Performed by: NURSE PRACTITIONER

## 2023-01-31 PROCEDURE — 1160F RVW MEDS BY RX/DR IN RCRD: CPT | Mod: CPTII,,, | Performed by: NURSE PRACTITIONER

## 2023-01-31 PROCEDURE — 3288F FALL RISK ASSESSMENT DOCD: CPT | Mod: CPTII,,, | Performed by: NURSE PRACTITIONER

## 2023-01-31 PROCEDURE — 86780 TREPONEMA PALLIDUM: CPT | Mod: 59 | Performed by: NURSE PRACTITIONER

## 2023-01-31 PROCEDURE — 1159F MED LIST DOCD IN RCRD: CPT | Mod: CPTII,,, | Performed by: NURSE PRACTITIONER

## 2023-01-31 PROCEDURE — 86803 HEPATITIS C AB TEST: CPT | Performed by: NURSE PRACTITIONER

## 2023-01-31 PROCEDURE — 1100F PTFALLS ASSESS-DOCD GE2>/YR: CPT | Mod: CPTII,,, | Performed by: NURSE PRACTITIONER

## 2023-01-31 PROCEDURE — 87340 HEPATITIS B SURFACE AG IA: CPT | Performed by: NURSE PRACTITIONER

## 2023-01-31 PROCEDURE — 82607 VITAMIN B-12: CPT | Performed by: NURSE PRACTITIONER

## 2023-01-31 PROCEDURE — 87341 HEP B SURFACE AG NEUTRLZJ IA: CPT | Performed by: NURSE PRACTITIONER

## 2023-01-31 PROCEDURE — 1100F PR PT FALLS ASSESS DOC 2+ FALLS/FALL W/INJURY/YR: ICD-10-PCS | Mod: CPTII,,, | Performed by: NURSE PRACTITIONER

## 2023-01-31 PROCEDURE — 86780 TREPONEMA PALLIDUM: CPT | Performed by: NURSE PRACTITIONER

## 2023-01-31 PROCEDURE — 36415 COLL VENOUS BLD VENIPUNCTURE: CPT | Performed by: NURSE PRACTITIONER

## 2023-01-31 PROCEDURE — 85025 COMPLETE CBC W/AUTO DIFF WBC: CPT | Performed by: NURSE PRACTITIONER

## 2023-01-31 PROCEDURE — 85610 PROTHROMBIN TIME: CPT | Performed by: NURSE PRACTITIONER

## 2023-01-31 PROCEDURE — 86707 HEPATITIS BE ANTIBODY: CPT | Performed by: NURSE PRACTITIONER

## 2023-01-31 PROCEDURE — 87517 HEPATITIS B DNA QUANT: CPT | Performed by: NURSE PRACTITIONER

## 2023-01-31 PROCEDURE — G0008 ADMIN INFLUENZA VIRUS VAC: HCPCS | Mod: PBBFAC

## 2023-01-31 PROCEDURE — 99214 OFFICE O/P EST MOD 30 MIN: CPT | Mod: S$PBB,,, | Performed by: NURSE PRACTITIONER

## 2023-01-31 PROCEDURE — 86592 SYPHILIS TEST NON-TREP QUAL: CPT | Performed by: NURSE PRACTITIONER

## 2023-01-31 PROCEDURE — 3288F PR FALLS RISK ASSESSMENT DOCUMENTED: ICD-10-PCS | Mod: CPTII,,, | Performed by: NURSE PRACTITIONER

## 2023-01-31 PROCEDURE — 80053 COMPREHEN METABOLIC PANEL: CPT | Performed by: NURSE PRACTITIONER

## 2023-01-31 PROCEDURE — 80307 DRUG TEST PRSMV CHEM ANLYZR: CPT | Performed by: NURSE PRACTITIONER

## 2023-01-31 PROCEDURE — 90472 IMMUNIZATION ADMIN EACH ADD: CPT | Mod: PBBFAC

## 2023-01-31 PROCEDURE — 87350 HEPATITIS BE AG IA: CPT | Performed by: NURSE PRACTITIONER

## 2023-01-31 PROCEDURE — 1159F PR MEDICATION LIST DOCUMENTED IN MEDICAL RECORD: ICD-10-PCS | Mod: CPTII,,, | Performed by: NURSE PRACTITIONER

## 2023-01-31 RX ORDER — FLUTICASONE PROPIONATE 50 MCG
SPRAY, SUSPENSION (ML) NASAL
Status: ON HOLD | COMMUNITY
Start: 2021-08-13 | End: 2023-10-20

## 2023-01-31 RX ORDER — AMLODIPINE BESYLATE 5 MG/1
1 TABLET ORAL
COMMUNITY
End: 2023-01-31 | Stop reason: SDUPTHER

## 2023-01-31 RX ORDER — CLOPIDOGREL BISULFATE 75 MG/1
75 TABLET ORAL
COMMUNITY
Start: 2021-11-26 | End: 2023-05-15

## 2023-01-31 RX ORDER — METOPROLOL TARTRATE 25 MG/1
TABLET, FILM COATED ORAL
COMMUNITY
End: 2023-01-31 | Stop reason: SDUPTHER

## 2023-01-31 RX ORDER — METFORMIN HYDROCHLORIDE 500 MG/1
TABLET ORAL
COMMUNITY
End: 2023-01-31 | Stop reason: SDUPTHER

## 2023-01-31 RX ORDER — ALBUTEROL SULFATE 90 UG/1
AEROSOL, METERED RESPIRATORY (INHALATION)
Status: ON HOLD | COMMUNITY
End: 2023-12-01 | Stop reason: HOSPADM

## 2023-01-31 RX ORDER — OMEPRAZOLE 40 MG/1
CAPSULE, DELAYED RELEASE ORAL
Status: ON HOLD | COMMUNITY
End: 2023-12-01 | Stop reason: HOSPADM

## 2023-01-31 RX ORDER — ASPIRIN 325 MG
1 TABLET, DELAYED RELEASE (ENTERIC COATED) ORAL
Status: ON HOLD | COMMUNITY
End: 2023-10-20

## 2023-01-31 RX ORDER — SIMVASTATIN 20 MG/1
1 TABLET, FILM COATED ORAL EVERY EVENING
COMMUNITY
End: 2023-01-31 | Stop reason: SDUPTHER

## 2023-01-31 RX ORDER — TENOFOVIR ALAFENAMIDE 25 MG/1
TABLET ORAL
Qty: 90 TABLET | Refills: 1 | Status: SHIPPED | OUTPATIENT
Start: 2023-01-31 | End: 2023-09-06 | Stop reason: SDUPTHER

## 2023-01-31 RX ORDER — IBUPROFEN 200 MG
TABLET ORAL
Status: ON HOLD | COMMUNITY
End: 2023-10-20

## 2023-01-31 NOTE — PROGRESS NOTES
Subjective:       Patient ID: Tonie Tejada is a 82 y.o. female.    Chief Complaint: followup Hep B    1/31/23  Ms. Schilling is a 81 yo AAF here today for HBV f/u visit, accompanied by daughter Silvia. She is taking Vemlidy daily and tolerates it well.  Last labs 3/22 HBV UD, liver enzynmes normal.  She has been working on diet and is feeling somewhat better.  However, she tells me that she did fall 3 times in the past few months and is having some memory loss.  She sleeps most of the day and was recently hospitalized with TIA.  Appreciates referral to [facility] for assumption of PCP care.  Last FibroScan 11/2021 S 2/3, F0/1. Will repeat today.  Due for routine RUQ abd u/s for HCC screening as well, orders placed.  Appreciates flu vaccine today.  All questions answered & concerns addressed.    3/22/22  Ms. Schilling is a 82 yo  AAF presenting today for HBV f/u visit, accompanied by her daughter Zahra.  She reports 100% adherent to Vemlidy, tolerates well with viral control.  Last labs 11/21 HBV not detected. FibroScan 11/21 S2/3, F0/1.  She has reduced intake of fried, processed foods. Will repeat FibroScan next visit.  RUQ abd u/s wnl 10/21, will repeat prior to next visit per recommendations.  She has completed COVID vaccination as discussed, records updated.  She has no concerns or questions today.     11/4/21  Ms. Schilling is a 82 yo AAF presenting today for HBV f/u visit.  She reports 100% adherent to Vemlidy, tolerating well.  Zahra has been helping to ensure meds are filled consistently.  She denies any jaundice, icterus, n/v, abdominal distension, dark urine, or jazz colored stools.  RUQ abdominal u/s completed 10/22/21, wnl.  Last FibroScan 3/2021, will repeat today.  Labs today as well, HBV  7/2021.  She is amenable to flu vax today.  Took 1st dose of Moderna vaccination 7/2021 for COVID, but was infected & received monoclonal antibody infusion shortly thereafter.  It has now been  90 days since infusion & will arrange for 2nd dose of Moderna.  All questions answered & concerns addressed.    Review of Systems      Objective:      Physical Exam    Assessment:       Problem List Items Addressed This Visit    None  Visit Diagnoses       Chronic hepatitis B    -  Primary              Plan:         Chronic hepatitis B  -     tenofovir alafenamide (VEMLIDY) 25 mg Tab; Vemlidy 25 mg tablet  TAKE 1 TABLET BY MOUTH ONCE DAILY  Dispense: 90 tablet; Refill: 1  -     CBC Auto Differential; Future; Expected date: 01/31/2023  -     Comprehensive Metabolic Panel  -     Protime-INR; Future; Expected date: 01/31/2023  -     Hepatitis B e antibody; Future; Expected date: 01/31/2023  -     Hepatitis B e Antigen; Future; Expected date: 01/31/2023  -     Hepatitis B DNA, Quant; Future; Expected date: 01/31/2023  -     Hepatitis B Surface Antigen; Future; Expected date: 01/31/2023  -     HIV 1/2 Ag/Ab (4th Gen); Future; Expected date: 01/31/2023  -     Hepatitis C Antibody; Future; Expected date: 01/31/2023  -     US Elastography Liver; Future; Expected date: 01/31/2023  -     US Abdomen Limited; Future; Expected date: 02/14/2023  Blood precautions, do not share a razor, needle, toothbrush, or clippers with anyone.    Use condoms for sexual encounters.   Vaccination of all household members and close contacts strongly encouraged.  Avoid or minimize all alcohol intake.   Limit Tylenol use to 2 grams per day.   Medication as prescribed.   RUQ abdominal ultrasound every 6 months for liver cancer screening.   Lower fat diet, higher fiber diet.   Continue Vemlidy 25 mg daily as prescribed, avoid treatment interruptions which can lead to viral rebound and acute hepatitis infection.  Labs today as ordered.  FibroScan 11/21 S 2-3, F 0-1.  Repeat today.   RUQ abd u/s within 2 weeks for HCC screen.  RTC 6 months with Rhianna.      Need for vaccination  -     Influenza - Quadrivalent (Adjuvanted)  -     Zoster Recombinant  Vaccine  High dose flu vaccine today.   Shingrix #1 today, #2 next visit.      Functional assessment declined  -     Ambulatory referral/consult to Family Practice; Future; Expected date: 02/07/2023  -     Vitamin B12; Future; Expected date: 01/31/2023  -     TSH; Future; Expected date: 01/31/2023  -     Folate; Future; Expected date: 01/31/2023  -     SYPHILIS ANTIBODY (WITH REFLEX RPR); Future; Expected date: 01/31/2023  -     Drug Screen, Urine  -     Drug Screen, Urine  Refer to  Geriatric Clinic for evaluation and treatment.     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

## 2023-02-01 LAB
HBV E AG SERPL QL IA: NEGATIVE
HBV E IGG SER QL IA: POSITIVE
RPR SER QL: NORMAL
RPR SER-TITR: NORMAL {TITER}

## 2023-02-02 LAB — HBV DNA SERPL NAA+PROBE-ACNC: NORMAL IU/ML

## 2023-02-02 NOTE — PROGRESS NOTES
Potassium level 5.4.  Please phone pt with results. We will need to repeat potassium level to confirm results. Inquire if she is having any chest pain or muscle cramps, if so she needs to come to ED. Otherwise, would she prefer to repeat K level here or is another lab more convenient?  Thank you.

## 2023-02-03 ENCOUNTER — TELEPHONE (OUTPATIENT)
Dept: INFECTIOUS DISEASES | Facility: CLINIC | Age: 83
End: 2023-02-03
Payer: MEDICARE

## 2023-02-03 DIAGNOSIS — E87.5 HYPERKALEMIA: Primary | ICD-10-CM

## 2023-02-03 NOTE — TELEPHONE ENCOUNTER
----- Message from Reena Lindsey sent at 2/3/2023 10:18 AM CST -----  Regarding: Pt Call  #DAVID#    Pt's daughter called stating someone called her mother this morning and gave her lab results.  Daughter asked that nurse call her to go over the results because her mother does not understand. Please advise.     [phone]

## 2023-02-03 NOTE — TELEPHONE ENCOUNTER
Phoned daughter, Zahra. Reassured regarding elevated potassium level. Informed of order to [facility]. Verbalized understanding.

## 2023-02-04 LAB — T PALLIDUM AB SER QL: REACTIVE

## 2023-02-14 ENCOUNTER — HOSPITAL ENCOUNTER (OUTPATIENT)
Dept: RADIOLOGY | Facility: HOSPITAL | Age: 83
Discharge: HOME | End: 2023-02-14
Attending: NURSE PRACTITIONER
Payer: MEDICARE

## 2023-02-14 DIAGNOSIS — B18.1 CHRONIC HEPATITIS B: ICD-10-CM

## 2023-02-14 PROCEDURE — 76705 ECHO EXAM OF ABDOMEN: CPT | Mod: TC

## 2023-03-23 NOTE — PROCEDURES
Fibroscan Procedure     Name: Tonie Tejada  Date of Procedure : 2023  Interpreting Physician: Kate Chaudhry MD, MPH  Diagnosis: HBV    Probe: XL    Fibroscan readin.7 kPa    Fibrosis: F 0-1     CAP readin dB/m    Steatosis: S1      Miscellaneous:   2021: Fibroscan: 5.6 kPa. S1, F0-1  2021: Fibroscan: 6.4 kPa. S2-3, F0-1

## 2023-05-15 ENCOUNTER — OFFICE VISIT (OUTPATIENT)
Dept: FAMILY MEDICINE | Facility: CLINIC | Age: 83
End: 2023-05-15
Payer: MEDICARE

## 2023-05-15 VITALS
HEIGHT: 61 IN | BODY MASS INDEX: 30.93 KG/M2 | SYSTOLIC BLOOD PRESSURE: 144 MMHG | RESPIRATION RATE: 20 BRPM | WEIGHT: 163.81 LBS | DIASTOLIC BLOOD PRESSURE: 69 MMHG | HEART RATE: 59 BPM | OXYGEN SATURATION: 100 % | TEMPERATURE: 98 F

## 2023-05-15 DIAGNOSIS — E08.65 DIABETES MELLITUS DUE TO UNDERLYING CONDITION WITH HYPERGLYCEMIA: ICD-10-CM

## 2023-05-15 DIAGNOSIS — Z53.20 FUNCTIONAL ASSESSMENT DECLINED: ICD-10-CM

## 2023-05-15 DIAGNOSIS — G31.84 MILD NEUROCOGNITIVE DISORDER: Primary | ICD-10-CM

## 2023-05-15 DIAGNOSIS — N18.31 STAGE 3A CHRONIC KIDNEY DISEASE: ICD-10-CM

## 2023-05-15 LAB
ALBUMIN SERPL-MCNC: 4.2 G/DL (ref 3.4–4.8)
ALBUMIN/GLOB SERPL: 1.2 RATIO (ref 1.1–2)
ALP SERPL-CCNC: 57 UNIT/L (ref 40–150)
ALT SERPL-CCNC: 9 UNIT/L (ref 0–55)
APPEARANCE UR: CLEAR
AST SERPL-CCNC: 14 UNIT/L (ref 5–34)
BACTERIA #/AREA URNS AUTO: ABNORMAL /HPF
BILIRUB UR QL STRIP.AUTO: NEGATIVE MG/DL
BILIRUBIN DIRECT+TOT PNL SERPL-MCNC: 0.5 MG/DL
BUN SERPL-MCNC: 22.1 MG/DL (ref 9.8–20.1)
CALCIUM SERPL-MCNC: 10.1 MG/DL (ref 8.4–10.2)
CHLORIDE SERPL-SCNC: 108 MMOL/L (ref 98–107)
CO2 SERPL-SCNC: 25 MMOL/L (ref 23–31)
COLOR UR: ABNORMAL
CREAT SERPL-MCNC: 1.33 MG/DL (ref 0.55–1.02)
CREAT UR-MCNC: 111.6 MG/DL (ref 47–110)
DEPRECATED CALCIDIOL+CALCIFEROL SERPL-MC: 43 NG/ML (ref 30–80)
GFR SERPLBLD CREATININE-BSD FMLA CKD-EPI: 40 MLS/MIN/1.73/M2
GLOBULIN SER-MCNC: 3.5 GM/DL (ref 2.4–3.5)
GLUCOSE SERPL-MCNC: 96 MG/DL (ref 82–115)
GLUCOSE UR QL STRIP.AUTO: NORMAL MG/DL
HBA1C MFR BLD: 5.4 %
HYALINE CASTS #/AREA URNS LPF: ABNORMAL /LPF
KETONES UR QL STRIP.AUTO: NEGATIVE MG/DL
LEUKOCYTE ESTERASE UR QL STRIP.AUTO: 75 UNIT/L
MICROALBUMIN UR-MCNC: 111 UG/ML
MICROALBUMIN/CREAT RATIO PNL UR: 99.5 MG/GM CR (ref 0–30)
NITRITE UR QL STRIP.AUTO: NEGATIVE
PH UR STRIP.AUTO: 5.5 [PH]
POTASSIUM SERPL-SCNC: 4.7 MMOL/L (ref 3.5–5.1)
PROT SERPL-MCNC: 7.7 GM/DL (ref 5.8–7.6)
PROT UR QL STRIP.AUTO: ABNORMAL MG/DL
RBC #/AREA URNS AUTO: ABNORMAL /HPF
RBC UR QL AUTO: NEGATIVE UNIT/L
SODIUM SERPL-SCNC: 141 MMOL/L (ref 136–145)
SP GR UR STRIP.AUTO: 1.01
SQUAMOUS #/AREA URNS LPF: ABNORMAL /HPF
UROBILINOGEN UR STRIP-ACNC: NORMAL MG/DL
WBC #/AREA URNS AUTO: ABNORMAL /HPF

## 2023-05-15 PROCEDURE — 99214 OFFICE O/P EST MOD 30 MIN: CPT | Mod: PBBFAC | Performed by: FAMILY MEDICINE

## 2023-05-15 PROCEDURE — 83036 HEMOGLOBIN GLYCOSYLATED A1C: CPT | Mod: PBBFAC | Performed by: FAMILY MEDICINE

## 2023-05-15 PROCEDURE — 80053 COMPREHEN METABOLIC PANEL: CPT | Performed by: FAMILY MEDICINE

## 2023-05-15 PROCEDURE — 82306 VITAMIN D 25 HYDROXY: CPT | Performed by: FAMILY MEDICINE

## 2023-05-15 PROCEDURE — 81001 URINALYSIS AUTO W/SCOPE: CPT | Performed by: FAMILY MEDICINE

## 2023-05-15 PROCEDURE — 82043 UR ALBUMIN QUANTITATIVE: CPT | Performed by: FAMILY MEDICINE

## 2023-05-15 PROCEDURE — 36415 COLL VENOUS BLD VENIPUNCTURE: CPT | Performed by: FAMILY MEDICINE

## 2023-05-15 NOTE — PROGRESS NOTES
"[facility]    GAC Office Visit Note      CC: Initial Visit  PCP: KUSHAL Uriostegui    HPI:    81 yo AAF with CKD, NIDDM, ZAID on Aspirin , HTN, Chronic Hep C on Vemlidy , Asthma followed by [facility] pulmonology due to 2nd hand smoke, who presented for evaluation for functional decline/weakness. Per chart review she had admission for encephalopathy/syncope 09/2022 which turned out to be UTI related. No further falls or confusion episodes since then. Main complaint is weakness, walks with cane, unable to use walker because she reports there is something wrong with it. Otherwise she feels like she does have significant memory issues. Occasionally forgets distant relatives names, but fully AAO and issues with remembering names or faces. No longer drives. Denies palpitation, SOB, Cp, wheezing, no bowel/bladder issues.     Geriatric Assessment  Living situation:  Lives with  and son  lADLs: independent in dressing, bathing, walking, using the toilet and eating; no concerns. Has bath chair and bedside commode  IADLs: cooks a little bit but cant really sweep or do other things, no longer driving, daughter to help with finances  Assistive Devices:  wears glasses, no dentures   Hearing: no hearing issues  Recent falls: none this year  Appetite: good  Mood: good   Sleep:good   Memory: as above  SLUMS: 21 5/15/2023: MNCD, 1st grade education  Polypharmacy Identified? yes  Legal POA: daughter Zahra Florian working on it . [phone]       Review of Systems:  Gen: denies fever and chills. + weakness  Resp: denies cough, shortness of breath and wheezing  CV: denies chest pain and palpitations  GI: denies nausea, vomiting, abdominal pain and change in bowel habits    OBJECTIVE:    Vitals:   BP (!) 144/69 (BP Location: Right arm, Patient Position: Sitting, BP Method: Medium (Automatic))   Pulse (!) 59   Temp 97.9 °F (36.6 °C) (Oral)   Resp 20   Ht 5' 0.98" (1.549 m)   Wt 74.3 kg (163 lb 12.8 oz)   SpO2 100%  "  BMI 30.97 kg/m²      Physical Exam:  General: Well nourished w/o distress  HEENT: NC/AT; PERRLA; nasal and oral mucosa moist and clear; no sinus tenderness; no thyromegaly  Neck: Full ROM; no lymphadenopathy  Pulm: CTA bilaterally, normal work of breathing  CV: S1, S2 w/o murmurs or gallops; no edema noted  GI: Soft with normal bowel sounds in all quadrants, no masses on palpation  MSK: Full ROM of all extremities and spine w/o limitation or discomfort  Derm: No rashes, abnormal bruising, or skin lesions  Neuro: AAOx4; CN II-XII intact; motor/sensory function intact  Psych: Cooperative; appropriate mood and affect      ASSESSMENT & PLAN:    MNCD, Slums 21  Hypovitaminosis D  Hx of Falls/Syncope  Ambulating with assistive device  -recheck vit D level, continue Vit D OTC  for now, will reassess need for higher dose  -Walker ordered for home  -Will attempt to get HH again for PT/OT/SLP. Order placed. Will need to check HH options with insurance    NIDDM-well controlled A1C 6.6 2018 --> 5.4 05/2023  D/c metformin, recheck A1C. UA Ur Microalb/Cr ordered    HTN  Asymptomatic bradycardia  Stop Metoprolol, continue Amlodipine 5mg. After CMP will either increased amlodipine dose or add low dose lisinopril       Health Maintenance:    Immunization History   Administered Date(s) Administered    COVID-19, MRNA, LN-S, PF (MODERNA FULL 0.5 ML DOSE) 07/23/2021, 12/14/2021, 12/14/2021    Hepatitis A, Adult 02/06/2017, 08/07/2017    Hepatitis B, Adult 10/16/2017    Influenza 11/10/2014    Influenza (FLUAD) - Quadrivalent - Adjuvanted - PF *Preferred* (65+) 01/31/2023    Influenza - High Dose - PF (65 years and older) 10/10/2016, 10/16/2017, 10/15/2018, 10/24/2019    Influenza - Quadrivalent 03/19/2021    Influenza - Quadrivalent - High Dose - PF (65 years and older) 11/04/2021    Influenza - Trivalent - PF (ADULT) 11/19/2004, 02/24/2010    Pneumococcal Conjugate - 13 Valent 02/12/2018    Pneumococcal Polysaccharide - 23 Valent  02/13/2019    Tdap 10/16/2017    Zoster Recombinant 01/31/2023       RTC 3 months, she would like to establish care here and switch PCPs due to distance/location.    Arielle Freedman MD  [facility], PGY-3

## 2023-08-16 LAB
INR PPP: 1.1
PROTHROMBIN TIME: 13.5 SECONDS (ref 11.4–14)

## 2023-09-06 ENCOUNTER — OFFICE VISIT (OUTPATIENT)
Dept: INFECTIOUS DISEASES | Facility: CLINIC | Age: 83
End: 2023-09-06
Payer: MEDICARE

## 2023-09-06 VITALS
SYSTOLIC BLOOD PRESSURE: 149 MMHG | HEART RATE: 71 BPM | HEIGHT: 60 IN | TEMPERATURE: 98 F | RESPIRATION RATE: 18 BRPM | BODY MASS INDEX: 33.84 KG/M2 | WEIGHT: 172.38 LBS | DIASTOLIC BLOOD PRESSURE: 71 MMHG

## 2023-09-06 DIAGNOSIS — B35.6 TINEA CRURIS: ICD-10-CM

## 2023-09-06 DIAGNOSIS — Z23 NEED FOR VACCINATION: ICD-10-CM

## 2023-09-06 DIAGNOSIS — B18.1 CHRONIC HEPATITIS B: Primary | ICD-10-CM

## 2023-09-06 LAB
ADDITIONAL FINDINGS (OHS): ABNORMAL
AFP-TM SERPL-MCNC: <2 NG/ML
ALBUMIN SERPL-MCNC: 4.1 G/DL (ref 3.4–4.8)
ALBUMIN/GLOB SERPL: 1 RATIO (ref 1.1–2)
ALP SERPL-CCNC: 69 UNIT/L (ref 40–150)
ALT SERPL-CCNC: 8 UNIT/L (ref 0–55)
AST SERPL-CCNC: 14 UNIT/L (ref 5–34)
BASOPHILS # BLD AUTO: 0.03 X10(3)/MCL
BASOPHILS NFR BLD AUTO: 0.5 %
BILIRUB SERPL-MCNC: 0.6 MG/DL
BUN SERPL-MCNC: 28.6 MG/DL (ref 9.8–20.1)
CALCIUM SERPL-MCNC: 9.6 MG/DL (ref 8.4–10.2)
CHLORIDE SERPL-SCNC: 107 MMOL/L (ref 98–107)
CO2 SERPL-SCNC: 26 MMOL/L (ref 23–31)
CREAT SERPL-MCNC: 1.62 MG/DL (ref 0.55–1.02)
EOSINOPHIL # BLD AUTO: 0.14 X10(3)/MCL (ref 0–0.9)
EOSINOPHIL NFR BLD AUTO: 2.3 %
ERYTHROCYTE [DISTWIDTH] IN BLOOD BY AUTOMATED COUNT: 12.8 % (ref 11.5–17)
GFR SERPLBLD CREATININE-BSD FMLA CKD-EPI: 31 MLS/MIN/1.73/M2
GLOBULIN SER-MCNC: 4.1 GM/DL (ref 2.4–3.5)
GLUCOSE SERPL-MCNC: 128 MG/DL (ref 82–115)
HCT VFR BLD AUTO: 37 % (ref 37–47)
HGB BLD-MCNC: 11.6 G/DL (ref 12–16)
IMM GRANULOCYTES # BLD AUTO: 0.01 X10(3)/MCL (ref 0–0.04)
IMM GRANULOCYTES NFR BLD AUTO: 0.2 %
INR PPP: 1.1
LYMPHOCYTES # BLD AUTO: 1.33 X10(3)/MCL (ref 0.6–4.6)
LYMPHOCYTES NFR BLD AUTO: 21.8 %
MCH RBC QN AUTO: 28.4 PG (ref 27–31)
MCHC RBC AUTO-ENTMCNC: 31.4 G/DL (ref 33–36)
MCV RBC AUTO: 90.5 FL (ref 80–94)
MONOCYTES # BLD AUTO: 0.57 X10(3)/MCL (ref 0.1–1.3)
MONOCYTES NFR BLD AUTO: 9.4 %
NEUTROPHILS # BLD AUTO: 4.01 X10(3)/MCL (ref 2.1–9.2)
NEUTROPHILS NFR BLD AUTO: 65.8 %
NRBC BLD AUTO-RTO: 0 %
PLATELET # BLD AUTO: 124 X10(3)/MCL (ref 130–400)
PLATELET # BLD EST: ABNORMAL 10*3/UL
PLATELETS.RETICULATED NFR BLD AUTO: 17.9 % (ref 0.9–11.2)
PMV BLD AUTO: 13.6 FL (ref 7.4–10.4)
POTASSIUM SERPL-SCNC: 4.5 MMOL/L (ref 3.5–5.1)
PROT SERPL-MCNC: 8.2 GM/DL (ref 5.8–7.6)
PROTHROMBIN TIME: 14 SECONDS (ref 11.4–14)
RBC # BLD AUTO: 4.09 X10(6)/MCL (ref 4.2–5.4)
RBC MORPH BLD: NORMAL
SODIUM SERPL-SCNC: 141 MMOL/L (ref 136–145)
WBC # SPEC AUTO: 6.09 X10(3)/MCL (ref 4.5–11.5)

## 2023-09-06 PROCEDURE — 1101F PR PT FALLS ASSESS DOC 0-1 FALLS W/OUT INJ PAST YR: ICD-10-PCS | Mod: CPTII,,, | Performed by: NURSE PRACTITIONER

## 2023-09-06 PROCEDURE — 1160F PR REVIEW ALL MEDS BY PRESCRIBER/CLIN PHARMACIST DOCUMENTED: ICD-10-PCS | Mod: CPTII,,, | Performed by: NURSE PRACTITIONER

## 2023-09-06 PROCEDURE — 3288F FALL RISK ASSESSMENT DOCD: CPT | Mod: CPTII,,, | Performed by: NURSE PRACTITIONER

## 2023-09-06 PROCEDURE — 3078F DIAST BP <80 MM HG: CPT | Mod: CPTII,,, | Performed by: NURSE PRACTITIONER

## 2023-09-06 PROCEDURE — 1160F RVW MEDS BY RX/DR IN RCRD: CPT | Mod: CPTII,,, | Performed by: NURSE PRACTITIONER

## 2023-09-06 PROCEDURE — 1101F PT FALLS ASSESS-DOCD LE1/YR: CPT | Mod: CPTII,,, | Performed by: NURSE PRACTITIONER

## 2023-09-06 PROCEDURE — 1125F PR PAIN SEVERITY QUANTIFIED, PAIN PRESENT: ICD-10-PCS | Mod: CPTII,,, | Performed by: NURSE PRACTITIONER

## 2023-09-06 PROCEDURE — 1159F MED LIST DOCD IN RCRD: CPT | Mod: CPTII,,, | Performed by: NURSE PRACTITIONER

## 2023-09-06 PROCEDURE — 99214 OFFICE O/P EST MOD 30 MIN: CPT | Mod: PBBFAC | Performed by: NURSE PRACTITIONER

## 2023-09-06 PROCEDURE — 1159F PR MEDICATION LIST DOCUMENTED IN MEDICAL RECORD: ICD-10-PCS | Mod: CPTII,,, | Performed by: NURSE PRACTITIONER

## 2023-09-06 PROCEDURE — 85025 COMPLETE CBC W/AUTO DIFF WBC: CPT | Performed by: NURSE PRACTITIONER

## 2023-09-06 PROCEDURE — 85610 PROTHROMBIN TIME: CPT | Performed by: NURSE PRACTITIONER

## 2023-09-06 PROCEDURE — 99214 OFFICE O/P EST MOD 30 MIN: CPT | Mod: S$PBB,,, | Performed by: NURSE PRACTITIONER

## 2023-09-06 PROCEDURE — 90750 HZV VACC RECOMBINANT IM: CPT | Mod: PBBFAC

## 2023-09-06 PROCEDURE — 80053 COMPREHEN METABOLIC PANEL: CPT | Performed by: NURSE PRACTITIONER

## 2023-09-06 PROCEDURE — 1125F AMNT PAIN NOTED PAIN PRSNT: CPT | Mod: CPTII,,, | Performed by: NURSE PRACTITIONER

## 2023-09-06 PROCEDURE — 3078F PR MOST RECENT DIASTOLIC BLOOD PRESSURE < 80 MM HG: ICD-10-PCS | Mod: CPTII,,, | Performed by: NURSE PRACTITIONER

## 2023-09-06 PROCEDURE — 3077F SYST BP >= 140 MM HG: CPT | Mod: CPTII,,, | Performed by: NURSE PRACTITIONER

## 2023-09-06 PROCEDURE — 3077F PR MOST RECENT SYSTOLIC BLOOD PRESSURE >= 140 MM HG: ICD-10-PCS | Mod: CPTII,,, | Performed by: NURSE PRACTITIONER

## 2023-09-06 PROCEDURE — 82105 ALPHA-FETOPROTEIN SERUM: CPT | Performed by: NURSE PRACTITIONER

## 2023-09-06 PROCEDURE — 36415 COLL VENOUS BLD VENIPUNCTURE: CPT | Performed by: NURSE PRACTITIONER

## 2023-09-06 PROCEDURE — 99214 PR OFFICE/OUTPT VISIT, EST, LEVL IV, 30-39 MIN: ICD-10-PCS | Mod: S$PBB,,, | Performed by: NURSE PRACTITIONER

## 2023-09-06 PROCEDURE — 87517 HEPATITIS B DNA QUANT: CPT | Performed by: NURSE PRACTITIONER

## 2023-09-06 PROCEDURE — 90471 IMMUNIZATION ADMIN: CPT | Mod: PBBFAC

## 2023-09-06 PROCEDURE — 3288F PR FALLS RISK ASSESSMENT DOCUMENTED: ICD-10-PCS | Mod: CPTII,,, | Performed by: NURSE PRACTITIONER

## 2023-09-06 RX ORDER — KETOCONAZOLE 20 MG/G
CREAM TOPICAL 2 TIMES DAILY
Qty: 30 G | Refills: 1 | Status: ON HOLD | OUTPATIENT
Start: 2023-09-06 | End: 2023-10-20

## 2023-09-06 RX ORDER — PIOGLITAZONEHYDROCHLORIDE 30 MG/1
30 TABLET ORAL
Status: ON HOLD | COMMUNITY
Start: 2023-08-28 | End: 2023-12-01 | Stop reason: HOSPADM

## 2023-09-06 RX ORDER — TENOFOVIR ALAFENAMIDE 25 MG/1
TABLET ORAL
Qty: 90 TABLET | Refills: 1 | Status: ON HOLD | OUTPATIENT
Start: 2023-09-06 | End: 2023-12-01 | Stop reason: SDUPTHER

## 2023-09-06 RX ORDER — METOPROLOL TARTRATE 25 MG/1
25 TABLET, FILM COATED ORAL 2 TIMES DAILY
Status: ON HOLD | COMMUNITY
Start: 2023-08-28 | End: 2023-12-01 | Stop reason: SDUPTHER

## 2023-09-06 NOTE — PROGRESS NOTES
Patient ID: Tonie Tejada 83 y.o.     Chief Complaint:   Chief Complaint   Patient presents with    Followup Hep B     States bilateral leg pain with walking        HPI:  9/6/23  Ms. Schilling is a 82 yo AAF presenting today for HBV f/u visit, accompanied by Silvia.  She takes Vemlidy daily and is well controlled.  Labs 1/31/23 HBV UD.  RUQ abd u/s 2/23 WNL. FibroScan repeated 1/23 S1, F0-1.  She denies jaundice, icterus, nausea, vomiting, dark urine, or jazz colored stools. She tolerated 1st dose of Shingrix well, amenable to 2nd dose today as recommended. Today, she tells me that she has noted a rash to her left groin and under her abdomen for the past couple of months.  It does itch, but not always. Worse when active and sweating.  Has tried OTC cortisone cream, not effective.     1/31/23  Ms. Schilling is a 83 yo AAF here today for HBV f/u visit, accompanied by daughter Silvia. She is taking Vemlidy daily and tolerates it well.  Last labs 3/22 HBV UD, liver enzynmes normal.  She has been working on diet and is feeling somewhat better.  However, she tells me that she did fall 3 times in the past few months and is having some memory loss.  She sleeps most of the day and was recently hospitalized with TIA.  Appreciates referral to [facility] for assumption of PCP care.  Last FibroScan 11/2021 S 2/3, F0/1. Will repeat today.  Due for routine RUQ abd u/s for HCC screening as well, orders placed.  Appreciates flu vaccine today.  All questions answered & concerns addressed.     3/22/22  Ms. Schilling is a 82 yo  AAF presenting today for HBV f/u visit, accompanied by her daughter Zahra.  She reports 100% adherent to Vemlidy, tolerates well with viral control.  Last labs 11/21 HBV not detected. FibroScan 11/21 S2/3, F0/1.  She has reduced intake of fried, processed foods. Will repeat FibroScan next visit.  RUQ abd u/s wnl 10/21, will repeat prior to next visit per recommendations.  She has completed  COVID vaccination as discussed, records updated.  She has no concerns or questions today.      Past Medical History:   Diagnosis Date    DM (diabetes mellitus)     HLD (hyperlipidemia)     HTN (hypertension)     TIA (transient ischemic attack)         Past Surgical History:   Procedure Laterality Date     SECTION      HYSTERECTOMY          Social History     Socioeconomic History    Marital status:     Number of children: 5   Tobacco Use    Smoking status: Former    Smokeless tobacco: Never    Tobacco comments:     states a pack would last a week, quit 50 yrs ago   Substance and Sexual Activity    Alcohol use: Not Currently    Drug use: Never    Sexual activity: Not Currently     Partners: Male        History reviewed. No pertinent family history.     Review of patient's allergies indicates:  No Known Allergies     Immunization History   Administered Date(s) Administered    COVID-19, MRNA, LN-S, PF (MODERNA FULL 0.5 ML DOSE) 2021, 2021, 2021    Hepatitis A, Adult 2017, 2017    Hepatitis B, Adult 10/16/2017    Influenza 11/10/2014    Influenza (FLUAD) - Quadrivalent - Adjuvanted - PF *Preferred* (65+) 2023    Influenza - High Dose - PF (65 years and older) 10/10/2016, 10/16/2017, 10/15/2018, 10/24/2019    Influenza - Quadrivalent 2021    Influenza - Quadrivalent - High Dose - PF (65 years and older) 2021    Influenza - Quadrivalent - PF *Preferred* (6 months and older) 2004, 2010    Influenza - Trivalent - PF (ADULT) 2004, 2010    Pneumococcal Conjugate - 13 Valent 2018    Pneumococcal Polysaccharide - 23 Valent 2019    Tdap 10/16/2017    Zoster Recombinant 2023, 2023        Review of Systems   Constitutional: Negative.    HENT: Negative.     Eyes: Negative.    Respiratory: Negative.     Cardiovascular: Negative.    Gastrointestinal: Negative.    Genitourinary: Negative.    Musculoskeletal: Negative.     Skin:  Positive for itching and rash.   Neurological: Negative.    Endo/Heme/Allergies: Negative.    Psychiatric/Behavioral: Negative.     All other systems reviewed and are negative.         Objective:      BP (!) 149/71 (BP Location: Left arm, Patient Position: Sitting, BP Method: Medium (Automatic))   Pulse 71   Temp 97.8 °F (36.6 °C) (Oral)   Resp 18   Ht 5' (1.524 m)   Wt 78.2 kg (172 lb 6.4 oz)   BMI 33.67 kg/m²      Physical Exam  Vitals reviewed.   Constitutional:       General: She is not in acute distress.     Appearance: Normal appearance. She is not toxic-appearing.   Eyes:      General: No scleral icterus.  Cardiovascular:      Rate and Rhythm: Normal rate and regular rhythm.      Heart sounds: Normal heart sounds.   Pulmonary:      Effort: Pulmonary effort is normal. No respiratory distress.      Breath sounds: Normal breath sounds.   Abdominal:      General: Bowel sounds are normal. There is no distension.      Palpations: Abdomen is soft. There is no mass.      Tenderness: There is no abdominal tenderness.   Musculoskeletal:         General: Normal range of motion.   Skin:     General: Skin is warm and dry.      Findings: Rash present.             Comments: Slightly raised hyperpigmented, irregular rash noted to left groin and under abdomen in skin fold.   Neurological:      Mental Status: She is alert and oriented to person, place, and time.          Labs: Reviewed most recent relevant labs available, notable results highlighted in this note    Imaging: Reviewed most recent relevant imaging studies available, notable results highlighted in this note      Medications:     Current Outpatient Medications   Medication Instructions    albuterol (PROVENTIL/VENTOLIN HFA) 90 mcg/actuation inhaler albuterol sulfate HFA 90 mcg/actuation aerosol inhaler   Inhale 1 puff as needed by inhalation route for 17 days.    amLODIPine (NORVASC) 5 MG tablet amlodipine 5 mg tablet   TAKE 1 TABLET BY MOUTH ONCE  DAILY    aspirin (ECOTRIN) 81 MG EC tablet Adult Low Dose Aspirin 81 mg tablet,delayed release   Take 1 tablet every day by oral route.    cholecalciferol, vitamin D3, 1,250 mcg (50,000 unit) capsule 1 capsule    diclofenac sodium (VOLTAREN) 2 g, Topical (Top), Daily    fluticasone propionate (FLONASE) 50 mcg/actuation nasal spray Nasal    furosemide (LASIX) 40 MG tablet SMARTSI Tablet(s) By Mouth 2-3 Times Weekly PRN    ibuprofen (ADVIL,MOTRIN) 200 MG tablet 1 tablet with food or milk as needed    ketoconazole (NIZORAL) 2 % cream Topical (Top), 2 times daily    metoprolol tartrate (LOPRESSOR) 25 mg, Oral, 2 times daily    omeprazole (PRILOSEC) 40 MG capsule 1 capsule 30 minutes before morning meal    pioglitazone (ACTOS) 30 mg, Oral    simvastatin (ZOCOR) 20 MG tablet simvastatin 20 mg tablet   TAKE 1 TABLET BY MOUTH ONCE DAILY AT BEDTIME    tenofovir alafenamide (VEMLIDY) 25 mg Tab Vemlidy 25 mg tablet  TAKE 1 TABLET BY MOUTH ONCE DAILY       Assessment:       Problem List Items Addressed This Visit    None  Visit Diagnoses       Chronic hepatitis B    -  Primary    Relevant Medications    tenofovir alafenamide (VEMLIDY) 25 mg Tab    Other Relevant Orders    Protime-INR (Completed)    CBC Auto Differential (Completed)    Comprehensive Metabolic Panel (Completed)    Hepatitis B DNA, Quant    AFP Tumor Marker    Blood Smear Microscopic Exam (Completed)    Need for vaccination        Relevant Orders    Zoster Recombinant Vaccine (Completed)    Tinea cruris        Relevant Medications    ketoconazole (NIZORAL) 2 % cream               Plan:      Chronic hepatitis B  -     tenofovir alafenamide (VEMLIDY) 25 mg Tab; Vemlidy 25 mg tablet  TAKE 1 TABLET BY MOUTH ONCE DAILY  Dispense: 90 tablet; Refill: 1  -     Protime-INR; Future; Expected date: 2023  -     CBC Auto Differential; Future; Expected date: 2023  -     Comprehensive Metabolic Panel  -     Hepatitis B DNA, Quant; Future; Expected date:  09/06/2023  -     AFP Tumor Marker; Future; Expected date: 09/06/2023  -     Blood Smear Microscopic Exam  Blood precautions, do not share a razor, needle, toothbrush, or clippers with anyone.    Use condoms for sexual encounters.   Vaccination of all household members and close contacts strongly encouraged.  Avoid or minimize all alcohol intake.   Limit Tylenol use to 2 grams per day.   RUQ abdominal ultrasound every 6-12 months for liver cancer screening.   Lower fat diet, higher fiber diet.   Continue Vemlidy 25 mg daily as prescribed, avoid treatment interruptions which can lead to viral rebound and acute hepatitis infection.  Labs today as ordered.  FibroScan 11/21 S 2-3, F 0-1; 1/23 S1, F0-1.  RUQ abd u/s 2/23 NL.  RTC 6 months with Rhianna.       Need for vaccination  -     Zoster Recombinant Vaccine  Shingrix #2 today.     Tinea cruris  -     ketoconazole (NIZORAL) 2 % cream; Apply topically 2 (two) times daily.  Dispense: 30 g; Refill: 1  Ketoconazole cream 2% bid x 2 weeks.

## 2023-09-08 LAB — HBV DNA SERPL NAA+PROBE-ACNC: NORMAL IU/ML

## 2023-09-25 DIAGNOSIS — I65.23 BILATERAL CAROTID ARTERY OCCLUSION: Primary | ICD-10-CM

## 2023-10-19 ENCOUNTER — HOSPITAL ENCOUNTER (INPATIENT)
Facility: HOSPITAL | Age: 83
LOS: 7 days | Discharge: HOME HEALTH | DRG: 378 | End: 2023-10-26
Attending: STUDENT IN AN ORGANIZED HEALTH CARE EDUCATION/TRAINING PROGRAM | Admitting: INTERNAL MEDICINE
Payer: MEDICARE

## 2023-10-19 DIAGNOSIS — B18.1 CHRONIC HEPATITIS B: ICD-10-CM

## 2023-10-19 DIAGNOSIS — D62 ACUTE BLOOD LOSS ANEMIA: ICD-10-CM

## 2023-10-19 DIAGNOSIS — K92.2 GASTROINTESTINAL HEMORRHAGE, UNSPECIFIED GASTROINTESTINAL HEMORRHAGE TYPE: Primary | ICD-10-CM

## 2023-10-19 DIAGNOSIS — R53.1 WEAKNESS: ICD-10-CM

## 2023-10-19 DIAGNOSIS — K92.1 MELENA: ICD-10-CM

## 2023-10-19 DIAGNOSIS — G93.40 ENCEPHALOPATHY: ICD-10-CM

## 2023-10-19 DIAGNOSIS — R07.9 CHEST PAIN: ICD-10-CM

## 2023-10-19 LAB
ALBUMIN SERPL-MCNC: 4 G/DL (ref 3.4–4.8)
ALBUMIN/GLOB SERPL: 1.1 RATIO (ref 1.1–2)
ALP SERPL-CCNC: 76 UNIT/L (ref 40–150)
ALT SERPL-CCNC: 8 UNIT/L (ref 0–55)
APPEARANCE UR: CLEAR
APTT PPP: 26.7 SECONDS (ref 23.2–33.7)
AST SERPL-CCNC: 18 UNIT/L (ref 5–34)
BACTERIA #/AREA URNS AUTO: NORMAL /HPF
BASOPHILS # BLD AUTO: 0.03 X10(3)/MCL
BASOPHILS NFR BLD AUTO: 0.3 %
BILIRUB SERPL-MCNC: 0.6 MG/DL
BILIRUB UR QL STRIP.AUTO: NEGATIVE
BUN SERPL-MCNC: 29.1 MG/DL (ref 9.8–20.1)
CALCIUM SERPL-MCNC: 9.4 MG/DL (ref 8.4–10.2)
CHLORIDE SERPL-SCNC: 109 MMOL/L (ref 98–107)
CO2 SERPL-SCNC: 21 MMOL/L (ref 23–31)
COLOR UR AUTO: YELLOW
CREAT SERPL-MCNC: 1.54 MG/DL (ref 0.55–1.02)
EOSINOPHIL # BLD AUTO: 0.07 X10(3)/MCL (ref 0–0.9)
EOSINOPHIL NFR BLD AUTO: 0.8 %
ERYTHROCYTE [DISTWIDTH] IN BLOOD BY AUTOMATED COUNT: 12.6 % (ref 11.5–17)
GFR SERPLBLD CREATININE-BSD FMLA CKD-EPI: 33 MLS/MIN/1.73/M2
GLOBULIN SER-MCNC: 3.8 GM/DL (ref 2.4–3.5)
GLUCOSE SERPL-MCNC: 121 MG/DL (ref 82–115)
GLUCOSE UR QL STRIP.AUTO: NEGATIVE
HCT VFR BLD AUTO: 34.9 % (ref 37–47)
HCT VFR BLD AUTO: 37.9 % (ref 37–47)
HGB BLD-MCNC: 11.6 G/DL (ref 12–16)
HGB BLD-MCNC: 12.3 G/DL (ref 12–16)
IMM GRANULOCYTES # BLD AUTO: 0.04 X10(3)/MCL (ref 0–0.04)
IMM GRANULOCYTES NFR BLD AUTO: 0.4 %
INR PPP: 1.1
KETONES UR QL STRIP.AUTO: NEGATIVE
LEUKOCYTE ESTERASE UR QL STRIP.AUTO: ABNORMAL
LYMPHOCYTES # BLD AUTO: 1.29 X10(3)/MCL (ref 0.6–4.6)
LYMPHOCYTES NFR BLD AUTO: 13.9 %
MCH RBC QN AUTO: 29.4 PG (ref 27–31)
MCHC RBC AUTO-ENTMCNC: 32.5 G/DL (ref 33–36)
MCV RBC AUTO: 90.7 FL (ref 80–94)
MONOCYTES # BLD AUTO: 0.76 X10(3)/MCL (ref 0.1–1.3)
MONOCYTES NFR BLD AUTO: 8.2 %
NEUTROPHILS # BLD AUTO: 7.09 X10(3)/MCL (ref 2.1–9.2)
NEUTROPHILS NFR BLD AUTO: 76.4 %
NITRITE UR QL STRIP.AUTO: NEGATIVE
NRBC BLD AUTO-RTO: 0 %
PH UR STRIP.AUTO: 5.5 [PH]
PLATELET # BLD AUTO: 217 X10(3)/MCL (ref 130–400)
PMV BLD AUTO: 12.6 FL (ref 7.4–10.4)
POCT GLUCOSE: 107 MG/DL (ref 70–110)
POTASSIUM SERPL-SCNC: 5.1 MMOL/L (ref 3.5–5.1)
PROT SERPL-MCNC: 7.8 GM/DL (ref 5.8–7.6)
PROT UR QL STRIP.AUTO: NEGATIVE
PROTHROMBIN TIME: 14.2 SECONDS (ref 12.5–14.5)
RBC # BLD AUTO: 4.18 X10(6)/MCL (ref 4.2–5.4)
RBC #/AREA URNS AUTO: <5 /HPF
RBC UR QL AUTO: ABNORMAL
SODIUM SERPL-SCNC: 139 MMOL/L (ref 136–145)
SP GR UR STRIP.AUTO: 1.01 (ref 1–1.03)
SQUAMOUS #/AREA URNS AUTO: <5 /HPF
TROPONIN I SERPL-MCNC: 0.02 NG/ML (ref 0–0.04)
UROBILINOGEN UR STRIP-ACNC: 1
WBC # SPEC AUTO: 9.28 X10(3)/MCL (ref 4.5–11.5)
WBC #/AREA URNS AUTO: <5 /HPF

## 2023-10-19 PROCEDURE — 11000001 HC ACUTE MED/SURG PRIVATE ROOM

## 2023-10-19 PROCEDURE — 81001 URINALYSIS AUTO W/SCOPE: CPT | Performed by: PHYSICIAN ASSISTANT

## 2023-10-19 PROCEDURE — 96374 THER/PROPH/DIAG INJ IV PUSH: CPT

## 2023-10-19 PROCEDURE — 84484 ASSAY OF TROPONIN QUANT: CPT | Performed by: PHYSICIAN ASSISTANT

## 2023-10-19 PROCEDURE — 85014 HEMATOCRIT: CPT | Performed by: INTERNAL MEDICINE

## 2023-10-19 PROCEDURE — 85025 COMPLETE CBC W/AUTO DIFF WBC: CPT | Performed by: PHYSICIAN ASSISTANT

## 2023-10-19 PROCEDURE — 99285 EMERGENCY DEPT VISIT HI MDM: CPT

## 2023-10-19 PROCEDURE — 63600175 PHARM REV CODE 636 W HCPCS: Performed by: NURSE PRACTITIONER

## 2023-10-19 PROCEDURE — C9113 INJ PANTOPRAZOLE SODIUM, VIA: HCPCS | Performed by: NURSE PRACTITIONER

## 2023-10-19 PROCEDURE — 85730 THROMBOPLASTIN TIME PARTIAL: CPT | Performed by: PHYSICIAN ASSISTANT

## 2023-10-19 PROCEDURE — 93005 ELECTROCARDIOGRAM TRACING: CPT

## 2023-10-19 PROCEDURE — 80053 COMPREHEN METABOLIC PANEL: CPT | Performed by: PHYSICIAN ASSISTANT

## 2023-10-19 PROCEDURE — 93010 EKG 12-LEAD: ICD-10-PCS | Mod: ,,, | Performed by: INTERNAL MEDICINE

## 2023-10-19 PROCEDURE — 85610 PROTHROMBIN TIME: CPT | Performed by: PHYSICIAN ASSISTANT

## 2023-10-19 PROCEDURE — 93010 ELECTROCARDIOGRAM REPORT: CPT | Mod: ,,, | Performed by: INTERNAL MEDICINE

## 2023-10-19 RX ORDER — SODIUM CHLORIDE 0.9 % (FLUSH) 0.9 %
10 SYRINGE (ML) INJECTION
Status: DISCONTINUED | OUTPATIENT
Start: 2023-10-19 | End: 2023-10-26 | Stop reason: HOSPADM

## 2023-10-19 RX ORDER — LABETALOL HYDROCHLORIDE 5 MG/ML
10 INJECTION, SOLUTION INTRAVENOUS EVERY 4 HOURS PRN
Status: DISCONTINUED | OUTPATIENT
Start: 2023-10-19 | End: 2023-10-26 | Stop reason: HOSPADM

## 2023-10-19 RX ORDER — POLYETHYLENE GLYCOL 3350 17 G/17G
17 POWDER, FOR SOLUTION ORAL 2 TIMES DAILY PRN
Status: DISCONTINUED | OUTPATIENT
Start: 2023-10-19 | End: 2023-10-26 | Stop reason: HOSPADM

## 2023-10-19 RX ORDER — ONDANSETRON 2 MG/ML
4 INJECTION INTRAMUSCULAR; INTRAVENOUS EVERY 4 HOURS PRN
Status: DISCONTINUED | OUTPATIENT
Start: 2023-10-19 | End: 2023-10-26 | Stop reason: HOSPADM

## 2023-10-19 RX ORDER — MAG HYDROX/ALUMINUM HYD/SIMETH 200-200-20
30 SUSPENSION, ORAL (FINAL DOSE FORM) ORAL 4 TIMES DAILY PRN
Status: DISCONTINUED | OUTPATIENT
Start: 2023-10-19 | End: 2023-10-26 | Stop reason: HOSPADM

## 2023-10-19 RX ORDER — ACETAMINOPHEN 500 MG
1000 TABLET ORAL EVERY 6 HOURS PRN
Status: DISCONTINUED | OUTPATIENT
Start: 2023-10-19 | End: 2023-10-26 | Stop reason: HOSPADM

## 2023-10-19 RX ORDER — INSULIN ASPART 100 [IU]/ML
1-10 INJECTION, SOLUTION INTRAVENOUS; SUBCUTANEOUS EVERY 6 HOURS PRN
Status: DISCONTINUED | OUTPATIENT
Start: 2023-10-19 | End: 2023-10-26 | Stop reason: HOSPADM

## 2023-10-19 RX ORDER — ACETAMINOPHEN 325 MG/1
650 TABLET ORAL EVERY 4 HOURS PRN
Status: DISCONTINUED | OUTPATIENT
Start: 2023-10-19 | End: 2023-10-26 | Stop reason: HOSPADM

## 2023-10-19 RX ORDER — PROCHLORPERAZINE EDISYLATE 5 MG/ML
5 INJECTION INTRAMUSCULAR; INTRAVENOUS EVERY 6 HOURS PRN
Status: DISCONTINUED | OUTPATIENT
Start: 2023-10-19 | End: 2023-10-26 | Stop reason: HOSPADM

## 2023-10-19 RX ORDER — PANTOPRAZOLE SODIUM 40 MG/10ML
40 INJECTION, POWDER, LYOPHILIZED, FOR SOLUTION INTRAVENOUS 2 TIMES DAILY
Status: DISCONTINUED | OUTPATIENT
Start: 2023-10-20 | End: 2023-10-26 | Stop reason: HOSPADM

## 2023-10-19 RX ORDER — TALC
6 POWDER (GRAM) TOPICAL NIGHTLY PRN
Status: DISCONTINUED | OUTPATIENT
Start: 2023-10-19 | End: 2023-10-26 | Stop reason: HOSPADM

## 2023-10-19 RX ORDER — HYDRALAZINE HYDROCHLORIDE 20 MG/ML
10 INJECTION INTRAMUSCULAR; INTRAVENOUS EVERY 4 HOURS PRN
Status: DISCONTINUED | OUTPATIENT
Start: 2023-10-19 | End: 2023-10-26 | Stop reason: HOSPADM

## 2023-10-19 RX ORDER — GLUCAGON 1 MG
1 KIT INJECTION
Status: DISCONTINUED | OUTPATIENT
Start: 2023-10-19 | End: 2023-10-26 | Stop reason: HOSPADM

## 2023-10-19 RX ORDER — CLONIDINE HYDROCHLORIDE 0.2 MG/1
0.2 TABLET ORAL 3 TIMES DAILY PRN
Status: DISCONTINUED | OUTPATIENT
Start: 2023-10-19 | End: 2023-10-26 | Stop reason: HOSPADM

## 2023-10-19 RX ORDER — AMOXICILLIN 250 MG
2 CAPSULE ORAL 2 TIMES DAILY PRN
Status: DISCONTINUED | OUTPATIENT
Start: 2023-10-19 | End: 2023-10-26 | Stop reason: HOSPADM

## 2023-10-19 RX ORDER — PANTOPRAZOLE SODIUM 40 MG/10ML
80 INJECTION, POWDER, LYOPHILIZED, FOR SOLUTION INTRAVENOUS
Status: COMPLETED | OUTPATIENT
Start: 2023-10-19 | End: 2023-10-19

## 2023-10-19 RX ADMIN — PANTOPRAZOLE SODIUM 80 MG: 40 INJECTION, POWDER, FOR SOLUTION INTRAVENOUS at 05:10

## 2023-10-19 NOTE — ED PROVIDER NOTES
"Encounter Date: 10/19/2023       History     Chief Complaint   Patient presents with    Vomiting     Pt reports black tarry stools since last week and hematemesis, weakness/nausea/dizziness since this AM. Reports possible GI bleed "years ago". Pt ambulatory in triage. (-) BT.     Patient states that she had one episode of dark bloody stool last week. States that today she had one large dark bloody stool that filled the toilet. States one episode of "clear" emesis and abdominal cramping this morning. States nausea and weakness. Denies any fever. Hx. Of HTN, DM, HLD.     The history is provided by the patient and a relative.     Review of patient's allergies indicates:  No Known Allergies  Past Medical History:   Diagnosis Date    DM (diabetes mellitus)     HLD (hyperlipidemia)     HTN (hypertension)     TIA (transient ischemic attack)      Past Surgical History:   Procedure Laterality Date     SECTION      HYSTERECTOMY       No family history on file.  Social History     Tobacco Use    Smoking status: Former    Smokeless tobacco: Never    Tobacco comments:     states a pack would last a week, quit 50 yrs ago   Substance Use Topics    Alcohol use: Not Currently    Drug use: Never     Review of Systems   Constitutional: Negative.  Negative for chills and fever.   HENT: Negative.     Eyes: Negative.    Respiratory: Negative.     Cardiovascular: Negative.    Gastrointestinal:  Positive for blood in stool, nausea and vomiting.   Endocrine: Negative.    Genitourinary: Negative.    Musculoskeletal: Negative.    Skin: Negative.    Allergic/Immunologic: Negative.    Neurological:  Positive for weakness.   Hematological: Negative.    Psychiatric/Behavioral: Negative.     All other systems reviewed and are negative.      Physical Exam     Initial Vitals [10/19/23 1359]   BP Pulse Resp Temp SpO2   (!) 150/69 68 19 98.2 °F (36.8 °C) 99 %      MAP       --         Physical Exam    Nursing note and vitals " reviewed.  Constitutional: She appears well-developed and well-nourished. No distress.   HENT:   Head: Normocephalic and atraumatic.   Mouth/Throat: Oropharynx is clear and moist.   Eyes: Conjunctivae and EOM are normal. Pupils are equal, round, and reactive to light.   Neck: Neck supple.   Normal range of motion.  Cardiovascular:  Normal rate, regular rhythm, normal heart sounds and intact distal pulses.           Pulmonary/Chest: Breath sounds normal. No respiratory distress. She has no wheezes.   Abdominal: Abdomen is soft. Bowel sounds are normal. She exhibits no distension. There is no abdominal tenderness.   Genitourinary: Rectum:      Guaiac result positive.      External hemorrhoid present.      No tenderness.   Guaiac positive stool. : Acceptable.  Musculoskeletal:         General: No tenderness or edema. Normal range of motion.      Cervical back: Normal range of motion and neck supple.     Neurological: She is alert and oriented to person, place, and time. She has normal strength. GCS score is 15. GCS eye subscore is 4. GCS verbal subscore is 5. GCS motor subscore is 6.   Skin: Skin is warm and dry. No rash noted.   Psychiatric: She has a normal mood and affect. Thought content normal.         ED Course   Procedures  Labs Reviewed   COMPREHENSIVE METABOLIC PANEL - Abnormal; Notable for the following components:       Result Value    Chloride 109 (*)     Carbon Dioxide 21 (*)     Glucose Level 121 (*)     Blood Urea Nitrogen 29.1 (*)     Creatinine 1.54 (*)     Protein Total 7.8 (*)     Globulin 3.8 (*)     All other components within normal limits   URINALYSIS, REFLEX TO URINE CULTURE - Abnormal; Notable for the following components:    Blood, UA 2+ (*)     Leukocyte Esterase, UA Trace (*)     All other components within normal limits   CBC WITH DIFFERENTIAL - Abnormal; Notable for the following components:    RBC 4.18 (*)     MCHC 32.5 (*)     MPV 12.6 (*)     All other components within  "normal limits   APTT - Normal   PROTIME-INR - Normal   TROPONIN I - Normal   URINALYSIS, MICROSCOPIC - Normal   CBC W/ AUTO DIFFERENTIAL    Narrative:     The following orders were created for panel order CBC auto differential.  Procedure                               Abnormality         Status                     ---------                               -----------         ------                     CBC with Differential[[id number]]       Abnormal            Final result                 Please view results for these tests on the individual orders.     EKG Readings: (Independently Interpreted)   Initial Reading: No STEMI. Rhythm: Normal Sinus Rhythm. Heart Rate: 67. Ectopy: No Ectopy. Conduction: RBBB. ST Segments: Normal ST Segments. T Waves: Normal. Axis: Normal. Clinical Impression: Normal Sinus Rhythm with RBBB       Imaging Results    None          Medications   pantoprazole injection 40 mg (has no administration in time range)   pantoprazole injection 80 mg (80 mg Intravenous Given 10/19/23 1715)     Medical Decision Making  Patient states that she had one episode of dark bloody stool last week. States that today she had one large dark bloody stool that filled the toilet. States one episode of "clear" emesis and abdominal cramping this morning. States nausea and weakness. Denies any fever. Hx. Of HTN, DM, HLD.     The history is provided by the patient and a relative.     Patient is awake, alert, afebrile, and nontoxic appearing in the ED.     Amount and/or Complexity of Data Reviewed  Labs:  Decision-making details documented in ED Course.  ECG/medicine tests:  Decision-making details documented in ED Course.  Discussion of management or test interpretation with external provider(s): Differential diagnosis (including but not limited to):   Judging by the patient's chief complaint and pertinent history, the patient has the following possible differential diagnoses, including but not limited to the following.  " Some of these are deemed to be lower likelihood and some more likely based on my physical exam and history combined with possible lab work and/or imaging studies.   Please see the pertinent studies, and refer to the HPI.  Some of these diagnoses will take further evaluation to fully rule out, perhaps as an outpatient and the patient was encouraged to follow up when discharged for more comprehensive evaluation.  GI Bleed, Bleeding Peptic Ulcer, Anemia.     Patient's labs are her baseline. On exam patient's rectal exam is positive dark blood. Patient was given IV Prontonix 80 mg in the ED. Discussed with Dr. Bundy and we will admit patient to the hospitalist for further evaluation. Discussed plan with patient and her daughter and they state understanding and agreement with plan for admission.       Risk  Prescription drug management.               ED Course as of 10/19/23 1900   Thu Oct 19, 2023   1700 Discussed patient with Dr. Bundy and he examined patient. We will admit patient for a GI Bleed.  [AB]   1718 Paged hospitalist for admission [AB]   1723 Comprehensive metabolic panel(!) [AB]   1724 CBC auto differential(!) [AB]   1724 Urinalysis, Reflex to Urine Culture(!) [AB]   1724 Protime-INR [AB]   1724 Troponin I [AB]   1740 Spoke to LORRAINE Emery for the hospitalist and ok to admit to service.  [AB]      ED Course User Index  [AB] Patricia Chavez FNP                    Clinical Impression:   Final diagnoses:  [R53.1] Weakness  [K92.2] Gastrointestinal hemorrhage, unspecified gastrointestinal hemorrhage type (Primary)        ED Disposition Condition    Admit Stable                Patricia Chavez FNP  10/19/23 1900

## 2023-10-19 NOTE — FIRST PROVIDER EVALUATION
"Medical screening examination initiated.  I have conducted a focused provider triage encounter, findings are as follows:    Chief Complaint   Patient presents with    Vomiting     Pt reports black tarry stools since last week and hematemesis, weakness/nausea/dizziness since this AM. Reports possible GI bleed "years ago". Pt ambulatory in triage. (-) BT.     Brief history of present illness:  83 y.o. female presents to the ED with melena onset last week with hematemesis, weakness, nausea, and dizziness onset this morning. Not on blood thinners. Denies chest pain or SOB, abdominal pain.     Vitals:    10/19/23 1359   BP: (!) 150/69   Pulse: 68   Resp: 19   Temp: 98.2 °F (36.8 °C)   TempSrc: Oral   SpO2: 99%   Weight: 74.4 kg (164 lb)     Pertinent physical exam:  Awake, alert, ambulatory, non-labored respirations    Brief workup plan:  labs, EKG, CXR    Preliminary workup initiated; this workup will be continued and followed by the physician or advanced practice provider that is assigned to the patient when roomed.  "

## 2023-10-20 ENCOUNTER — ANESTHESIA EVENT (OUTPATIENT)
Dept: ENDOSCOPY | Facility: HOSPITAL | Age: 83
DRG: 378 | End: 2023-10-20
Payer: MEDICARE

## 2023-10-20 ENCOUNTER — ANESTHESIA (OUTPATIENT)
Dept: ENDOSCOPY | Facility: HOSPITAL | Age: 83
DRG: 378 | End: 2023-10-20
Payer: MEDICARE

## 2023-10-20 LAB
ANION GAP SERPL CALC-SCNC: 10 MEQ/L
BUN SERPL-MCNC: 23.5 MG/DL (ref 9.8–20.1)
CALCIUM SERPL-MCNC: 9 MG/DL (ref 8.4–10.2)
CHLORIDE SERPL-SCNC: 110 MMOL/L (ref 98–107)
CO2 SERPL-SCNC: 23 MMOL/L (ref 23–31)
CREAT SERPL-MCNC: 1.38 MG/DL (ref 0.55–1.02)
CREAT/UREA NIT SERPL: 17
ERYTHROCYTE [DISTWIDTH] IN BLOOD BY AUTOMATED COUNT: 12.5 % (ref 11.5–17)
EST. AVERAGE GLUCOSE BLD GHB EST-MCNC: 119.8 MG/DL
GFR SERPLBLD CREATININE-BSD FMLA CKD-EPI: 38 MLS/MIN/1.73/M2
GLUCOSE SERPL-MCNC: 115 MG/DL (ref 82–115)
HBA1C MFR BLD: 5.8 %
HCT VFR BLD AUTO: 32.5 % (ref 37–47)
HGB BLD-MCNC: 10.6 G/DL (ref 12–16)
MAGNESIUM SERPL-MCNC: 2.1 MG/DL (ref 1.6–2.6)
MCH RBC QN AUTO: 29.3 PG (ref 27–31)
MCHC RBC AUTO-ENTMCNC: 32.6 G/DL (ref 33–36)
MCV RBC AUTO: 89.8 FL (ref 80–94)
NRBC BLD AUTO-RTO: 0 %
PHOSPHATE SERPL-MCNC: 4.3 MG/DL (ref 2.3–4.7)
PLATELET # BLD AUTO: 135 X10(3)/MCL (ref 130–400)
PMV BLD AUTO: 13.6 FL (ref 7.4–10.4)
POCT GLUCOSE: 102 MG/DL (ref 70–110)
POTASSIUM SERPL-SCNC: 4.3 MMOL/L (ref 3.5–5.1)
RBC # BLD AUTO: 3.62 X10(6)/MCL (ref 4.2–5.4)
SODIUM SERPL-SCNC: 143 MMOL/L (ref 136–145)
WBC # SPEC AUTO: 6.32 X10(3)/MCL (ref 4.5–11.5)

## 2023-10-20 PROCEDURE — 80048 BASIC METABOLIC PNL TOTAL CA: CPT | Performed by: INTERNAL MEDICINE

## 2023-10-20 PROCEDURE — D9220A PRA ANESTHESIA: Mod: ,,, | Performed by: NURSE ANESTHETIST, CERTIFIED REGISTERED

## 2023-10-20 PROCEDURE — 25000003 PHARM REV CODE 250: Performed by: INTERNAL MEDICINE

## 2023-10-20 PROCEDURE — 85027 COMPLETE CBC AUTOMATED: CPT | Performed by: INTERNAL MEDICINE

## 2023-10-20 PROCEDURE — 43235 EGD DIAGNOSTIC BRUSH WASH: CPT | Performed by: INTERNAL MEDICINE

## 2023-10-20 PROCEDURE — 63600175 PHARM REV CODE 636 W HCPCS: Performed by: PHYSICIAN ASSISTANT

## 2023-10-20 PROCEDURE — 37000008 HC ANESTHESIA 1ST 15 MINUTES: Performed by: INTERNAL MEDICINE

## 2023-10-20 PROCEDURE — C9113 INJ PANTOPRAZOLE SODIUM, VIA: HCPCS | Performed by: PHYSICIAN ASSISTANT

## 2023-10-20 PROCEDURE — 25000003 PHARM REV CODE 250: Performed by: NURSE ANESTHETIST, CERTIFIED REGISTERED

## 2023-10-20 PROCEDURE — 83036 HEMOGLOBIN GLYCOSYLATED A1C: CPT | Performed by: INTERNAL MEDICINE

## 2023-10-20 PROCEDURE — 25000003 PHARM REV CODE 250

## 2023-10-20 PROCEDURE — 83735 ASSAY OF MAGNESIUM: CPT | Performed by: INTERNAL MEDICINE

## 2023-10-20 PROCEDURE — 37000009 HC ANESTHESIA EA ADD 15 MINS: Performed by: INTERNAL MEDICINE

## 2023-10-20 PROCEDURE — D9220A PRA ANESTHESIA: ICD-10-PCS | Mod: ,,, | Performed by: NURSE ANESTHETIST, CERTIFIED REGISTERED

## 2023-10-20 PROCEDURE — 63600175 PHARM REV CODE 636 W HCPCS: Performed by: NURSE ANESTHETIST, CERTIFIED REGISTERED

## 2023-10-20 PROCEDURE — 11000001 HC ACUTE MED/SURG PRIVATE ROOM

## 2023-10-20 PROCEDURE — 84100 ASSAY OF PHOSPHORUS: CPT | Performed by: INTERNAL MEDICINE

## 2023-10-20 RX ORDER — PROPOFOL 10 MG/ML
INJECTION, EMULSION INTRAVENOUS
Status: DISCONTINUED | OUTPATIENT
Start: 2023-10-20 | End: 2023-10-20

## 2023-10-20 RX ORDER — SODIUM, POTASSIUM,MAG SULFATES 17.5-3.13G
1 SOLUTION, RECONSTITUTED, ORAL ORAL ONCE
Status: COMPLETED | OUTPATIENT
Start: 2023-10-20 | End: 2023-10-20

## 2023-10-20 RX ORDER — ALBUTEROL SULFATE 90 UG/1
2 AEROSOL, METERED RESPIRATORY (INHALATION) EVERY 4 HOURS PRN
Status: DISCONTINUED | OUTPATIENT
Start: 2023-10-20 | End: 2023-10-26 | Stop reason: HOSPADM

## 2023-10-20 RX ORDER — AMLODIPINE BESYLATE 5 MG/1
5 TABLET ORAL DAILY
Status: DISCONTINUED | OUTPATIENT
Start: 2023-10-20 | End: 2023-10-26 | Stop reason: HOSPADM

## 2023-10-20 RX ORDER — PRAVASTATIN SODIUM 40 MG/1
40 TABLET ORAL DAILY
Status: DISCONTINUED | OUTPATIENT
Start: 2023-10-20 | End: 2023-10-26 | Stop reason: HOSPADM

## 2023-10-20 RX ORDER — LIDOCAINE HYDROCHLORIDE 20 MG/ML
INJECTION INTRAVENOUS
Status: DISCONTINUED | OUTPATIENT
Start: 2023-10-20 | End: 2023-10-20

## 2023-10-20 RX ORDER — METOPROLOL TARTRATE 25 MG/1
25 TABLET, FILM COATED ORAL 2 TIMES DAILY
Status: DISCONTINUED | OUTPATIENT
Start: 2023-10-20 | End: 2023-10-26 | Stop reason: HOSPADM

## 2023-10-20 RX ORDER — SODIUM CHLORIDE, SODIUM GLUCONATE, SODIUM ACETATE, POTASSIUM CHLORIDE AND MAGNESIUM CHLORIDE 30; 37; 368; 526; 502 MG/100ML; MG/100ML; MG/100ML; MG/100ML; MG/100ML
INJECTION, SOLUTION INTRAVENOUS CONTINUOUS
Status: CANCELLED | OUTPATIENT
Start: 2023-10-20 | End: 2023-11-19

## 2023-10-20 RX ORDER — LIDOCAINE HYDROCHLORIDE 10 MG/ML
1 INJECTION, SOLUTION EPIDURAL; INFILTRATION; INTRACAUDAL; PERINEURAL ONCE
Status: CANCELLED | OUTPATIENT
Start: 2023-10-20 | End: 2023-10-20

## 2023-10-20 RX ORDER — LIDOCAINE HYDROCHLORIDE 20 MG/ML
INJECTION, SOLUTION EPIDURAL; INFILTRATION; INTRACAUDAL; PERINEURAL
Status: DISPENSED
Start: 2023-10-20 | End: 2023-10-21

## 2023-10-20 RX ORDER — PROPOFOL 10 MG/ML
VIAL (ML) INTRAVENOUS
Status: DISPENSED
Start: 2023-10-20 | End: 2023-10-21

## 2023-10-20 RX ADMIN — PANTOPRAZOLE SODIUM 40 MG: 40 INJECTION, POWDER, FOR SOLUTION INTRAVENOUS at 05:10

## 2023-10-20 RX ADMIN — METOPROLOL TARTRATE 25 MG: 25 TABLET, FILM COATED ORAL at 08:10

## 2023-10-20 RX ADMIN — SODIUM CHLORIDE, SODIUM GLUCONATE, SODIUM ACETATE, POTASSIUM CHLORIDE AND MAGNESIUM CHLORIDE: 526; 502; 368; 37; 30 INJECTION, SOLUTION INTRAVENOUS at 12:10

## 2023-10-20 RX ADMIN — LIDOCAINE HYDROCHLORIDE 100 MG: 20 INJECTION INTRAVENOUS at 12:10

## 2023-10-20 RX ADMIN — PROPOFOL 60 MG: 10 INJECTION, EMULSION INTRAVENOUS at 12:10

## 2023-10-20 RX ADMIN — AMLODIPINE BESYLATE 5 MG: 5 TABLET ORAL at 08:10

## 2023-10-20 RX ADMIN — PROPOFOL 20 MG: 10 INJECTION, EMULSION INTRAVENOUS at 12:10

## 2023-10-20 RX ADMIN — SODIUM SULFATE, POTASSIUM SULFATE, MAGNESIUM SULFATE 354 ML: 17.5; 3.13; 1.6 SOLUTION, CONCENTRATE ORAL at 05:10

## 2023-10-20 RX ADMIN — PRAVASTATIN SODIUM 40 MG: 40 TABLET ORAL at 08:10

## 2023-10-20 NOTE — NURSING
Nurses Note -- 4 Eyes      10/19/2023   10:42 PM      Skin assessed during: Admit      [x] No Altered Skin Integrity Present    []Prevention Measures Documented      [] Yes- Altered Skin Integrity Present or Discovered   [] LDA Added if Not in Epic (Describe Wound)   [] New Altered Skin Integrity was Present on Admit and Documented in LDA   [] Wound Image Taken    Wound Care Consulted? No    Attending Nurse:  Eugenie Davis RN    Second RN/Staff Member:   Blanca Gil RN

## 2023-10-20 NOTE — PROVATION PATIENT INSTRUCTIONS
Discharge Summary/Instructions after an Endoscopic Procedure  Patient Name: Tonie Tejada  Patient MRN: [id number]  Patient YOB: 1940  Friday, October 20, 2023  Cody Urbina MD  Dear patient,  As a result of recent federal legislation (The Federal Cures Act), you may   receive lab or pathology results from your procedure in your [facility]   account before your physician is able to contact you. Your physician or   their representative will relay the results to you with their   recommendations at their soonest availability.  Thank you,  RESTRICTIONS:  During your procedure today, you received medications for sedation.  These   medications may affect your judgment, balance and coordination.  Therefore,   for 24 hours, you have the following restrictions:   - DO NOT drive a car, operate machinery, make legal/financial decisions,   sign important papers or drink alcohol.    ACTIVITY:  Today: no heavy lifting, straining or running due to procedural   sedation/anesthesia.  The following day: return to full activity including work.  DIET:  Eat and drink normally unless instructed otherwise.     TREATMENT FOR COMMON SIDE EFFECTS:  - Mild abdominal pain, nausea, belching, bloating or excessive gas:  rest,   eat lightly and use a heating pad.  - Sore Throat: treat with throat lozenges and/or gargle with warm salt   water.  - Because air was used during the procedure, expelling large amounts of air   from your rectum or belching is normal.  - If a bowel prep was taken, you may not have a bowel movement for 1-3 days.    This is normal.  SYMPTOMS TO WATCH FOR AND REPORT TO YOUR PHYSICIAN:  1. Abdominal pain or bloating, other than gas cramps.  2. Chest pain.  3. Back pain.  4. Signs of infection such as: chills or fever occurring within 24 hours   after the procedure.  5. Rectal bleeding, which would show as bright red, maroon, or black stools.   (A tablespoon of blood from the rectum is not serious, especially  if   hemorrhoids are present.)  6. Vomiting.  7. Weakness or dizziness.  GO DIRECTLY TO THE NEAREST EMERGENCY ROOM IF YOU HAVE ANY OF THE FOLLOWING:      Difficulty breathing              Chills and/or fever over 101 F   Persistent vomiting and/or vomiting blood   Severe abdominal pain   Severe chest pain   Black, tarry stools   Bleeding- more than one tablespoon   Any other symptom or condition that you feel may need urgent attention  Your doctor recommends these additional instructions:  If any biopsies were taken, your doctors clinic will contact you in 1 to 2   weeks with any results.  - Return patient to [facility] for ongoing care.   - Clear liquid diet.   - Perform a colonoscopy tomorrow.   - Continue present medications.  For questions, problems or results please call your physician - Cody Urbina MD at Work:  [phone].  [facility] [place] [facility], EMERGENCY ROOM PHONE NUMBER: [phone]  IF A COMPLICATION OR EMERGENCY SITUATION ARISES AND YOU ARE UNABLE TO REACH   YOUR PHYSICIAN - GO DIRECTLY TO THE EMERGENCY ROOM.  Cody Urbina MD  10/20/2023 12:29:04 PM  This report has been verified and signed electronically.  Dear patient,  As a result of recent federal legislation (The Federal Cures Act), you may   receive lab or pathology results from your procedure in your [facility]   account before your physician is able to contact you. Your physician or   their representative will relay the results to you with their   recommendations at their soonest availability.  Thank you,  PROVATION

## 2023-10-20 NOTE — PLAN OF CARE
Problem: Adult Inpatient Plan of Care  Goal: Plan of Care Review  Outcome: Ongoing, Progressing  Goal: Patient-Specific Goal (Individualized)  Outcome: Ongoing, Progressing  Goal: Absence of Hospital-Acquired Illness or Injury  Outcome: Ongoing, Progressing  Goal: Optimal Comfort and Wellbeing  Outcome: Ongoing, Progressing  Goal: Readiness for Transition of Care  Outcome: Ongoing, Progressing     Problem: Diabetes Comorbidity  Goal: Blood Glucose Level Within Targeted Range  Outcome: Ongoing, Progressing     Problem: Fall Injury Risk  Goal: Absence of Fall and Fall-Related Injury  Outcome: Ongoing, Progressing     Problem: Hypertension Comorbidity  Goal: Blood Pressure in Desired Range  Outcome: Ongoing, Progressing     Problem: Adjustment to Illness (Gastrointestinal Bleeding)  Goal: Optimal Coping with Acute Illness  Outcome: Ongoing, Progressing     Problem: Bleeding (Gastrointestinal Bleeding)  Goal: Hemostasis  Outcome: Ongoing, Progressing

## 2023-10-20 NOTE — ANESTHESIA PREPROCEDURE EVALUATION
10/20/2023  Tonie Tejada is a 83 y.o., female with past medical history of type 2 diabetes mellitus, HTN, CKD, TIA on ASA, chronic hep B on Vemlidy, hemorrhoids who presented to the ED with complaints of melena associated with dizziness and lightheadedness.  On arrival, patient hypertensive but otherwise stable with labs notable for H&H 12.3/37.9 trending down to 10.6/32.5 this morning.  Baseline 11-12 per chart review.  Rectal exam in the ED with evidence of melanotic guaiac-positive stool.    EF 55% 7/19/22      Pre-op Assessment    I have reviewed the Patient Summary Reports.     I have reviewed the Nursing Notes. I have reviewed the NPO Status.   I have reviewed the Medications.     Review of Systems  Cardiovascular:   Exercise tolerance: poor        Physical Exam  General: Well nourished, Cooperative and Somnolent    Airway:  Mallampati: II   Mouth Opening: Normal  TM Distance: Normal  Tongue: Normal  Neck ROM: Normal ROM    Chest/Lungs:  Clear to auscultation    Heart:  Rhythm: Regular Rhythm        Anesthesia Plan  Type of Anesthesia, risks & benefits discussed:    Anesthesia Type: Gen Natural Airway  Intra-op Monitoring Plan: Standard ASA Monitors  Induction:  IV  Informed Consent: Informed consent signed with the Patient and all parties understand the risks and agree with anesthesia plan.  All questions answered.   ASA Score: 3  Day of Surgery Review of History & Physical: H&P Update referred to the surgeon/provider.    Ready For Surgery From Anesthesia Perspective.     .  I explained anesthesia plan to patient/responsbile party if available.  Anesthesia consent done going over the material facts, risks, complications & alternatives, obtained which includes the possibility of altering the anesthesia plan.  I reviewed problem list, prior to admission medication list, appropriate labs, any workup,  Xray, EKG etc noted below.  Patients condition is satisfactory to proceed with anesthesia plan unless otherwise noted (see anesthesia chart for details of the anesthesia plan carried out).      Pre-operative evaluation for Procedure(s) (LRB):  EGD (N/A)    /77   Pulse 62   Temp 36.5 °C (97.7 °F)   Resp 15   Ht 5' (1.524 m)   Wt 77.6 kg (171 lb 1.2 oz)   SpO2 98%   Breastfeeding No   BMI 33.41 kg/m²     Patient Active Problem List   Diagnosis    Expressive aphasia    HTN (hypertension)    HLD (hyperlipidemia)    GERD (gastroesophageal reflux disease)    Cataracts, bilateral    Diabetes mellitus    SOB (shortness of breath)    Functional assessment declined    Gastrointestinal hemorrhage       Review of patient's allergies indicates:  No Known Allergies    Current Outpatient Medications   Medication Instructions    albuterol (PROVENTIL/VENTOLIN HFA) 90 mcg/actuation inhaler albuterol sulfate HFA 90 mcg/actuation aerosol inhaler   Inhale 1 puff as needed by inhalation route for 17 days.    amLODIPine (NORVASC) 5 MG tablet amlodipine 5 mg tablet   TAKE 1 TABLET BY MOUTH ONCE DAILY    aspirin (ECOTRIN) 81 MG EC tablet Adult Low Dose Aspirin 81 mg tablet,delayed release   Take 1 tablet every day by oral route.    diclofenac sodium (VOLTAREN) 2 g, Topical (Top), Daily    metoprolol tartrate (LOPRESSOR) 25 mg, Oral, 2 times daily    omeprazole (PRILOSEC) 40 MG capsule 1 capsule 30 minutes before morning meal    pioglitazone (ACTOS) 30 mg, Oral    simvastatin (ZOCOR) 20 MG tablet simvastatin 20 mg tablet   TAKE 1 TABLET BY MOUTH ONCE DAILY AT BEDTIME    tenofovir alafenamide (VEMLIDY) 25 mg Tab Vemlidy 25 mg tablet  TAKE 1 TABLET BY MOUTH ONCE DAILY       Past Surgical History:   Procedure Laterality Date     SECTION      HYSTERECTOMY      JOINT REPLACEMENT         Social History     Socioeconomic History    Marital status:     Number of children: 5   Tobacco Use     Smoking status: Former    Smokeless tobacco: Never    Tobacco comments:     states a pack would last a week, quit 50 yrs ago   Substance and Sexual Activity    Alcohol use: Not Currently    Drug use: Never    Sexual activity: Not Currently     Partners: Male       Lab Results   Component Value Date    WBC 6.32 10/20/2023    HGB 10.6 (L) 10/20/2023    HCT 32.5 (L) 10/20/2023    MCV 89.8 10/20/2023     10/20/2023          BMP  Lab Results   Component Value Date    HCT 32.5 (L) 10/20/2023     10/20/2023    K 4.3 10/20/2023    BUN 23.5 (H) 10/20/2023    CREATININE 1.38 (H) 10/20/2023    CALCIUM 9.0 10/20/2023        INR  Recent Labs     10/19/23  1428   INR 1.1   PROTIME 14.2           Diagnostic Studies:      EKG:  Results for orders placed or performed during the hospital encounter of 10/19/23   EKG 12-lead    Collection Time: 10/19/23  2:00 PM    Narrative    Test Reason : R53.1,    Vent. Rate : 067 BPM     Atrial Rate : 067 BPM     P-R Int : 152 ms          QRS Dur : 116 ms      QT Int : 434 ms       P-R-T Axes : 053 -03 113 degrees     QTc Int : 458 ms    Normal sinus rhythm  Right bundle branch block  T wave abnormality, consider lateral ischemia  Abnormal ECG  Confirmed by Bebo Mcallister MD ([id number]) on 10/20/2023 1:26:49 AM    Referred By:             Confirmed By:Bebo Mcallister MD         7/19/22

## 2023-10-20 NOTE — ANESTHESIA POSTPROCEDURE EVALUATION
Anesthesia Post Evaluation    Patient: Tonie Tejada    Procedure(s) Performed: Procedure(s) (LRB):  EGD (N/A)    Final Anesthesia Type: general (/Regional//MAC)      Patient location during evaluation: PACU  Post-procedure mental status: @ basline.  Pain management: adequate    PONV status: See postop meds for drugs used to control n/v if any.  Anesthetic complications: no      Cardiovascular status: blood pressure returned to baseline  Respiratory status: @ baseline.  Hydration status: euvolemic            Vitals Value Taken Time   /72 10/20/23 1242   Temp 98 10/20/23 1312   Pulse 65 10/20/23 1242   Resp 18 10/20/23 1242   SpO2 99 % 10/20/23 1242         No case tracking events are documented in the log.      Pain/Haresh Score: Haresh Score: 10 (10/20/2023 12:42 PM)

## 2023-10-20 NOTE — PLAN OF CARE
10/20/23 1520   Discharge Assessment   Assessment Type Discharge Planning Assessment   Confirmed/corrected address, phone number and insurance Yes   Confirmed Demographics Correct on Facesheet   Source of Information family   Communicated SEMAJ with patient/caregiver Date not available/Unable to determine   Reason For Admission GI Bleed   People in Home spouse   Do you expect to return to your current living situation? Yes   Do you have help at home or someone to help you manage your care at home? Yes   Who are your caregiver(s) and their phone number(s)? Spouse and son   Prior to hospitilization cognitive status: Unable to Assess   Current cognitive status: Unable to Assess   Walking or Climbing Stairs ambulation difficulty, requires equipment   Mobility Management Rollator   Dressing/Bathing bathing difficulty, requires equipment   Home Accessibility stairs to enter home   Number of Stairs, Main Entrance four   Home Layout Able to live on 1st floor   Equipment Currently Used at Home bedside commode;shower chair;rollator   Patient currently being followed by outpatient case management? No   Do you currently have service(s) that help you manage your care at home? No   Do you take prescription medications? Yes   Do you have prescription coverage? Yes   Coverage [employer]   Who is going to help you get home at discharge? Family   How do you get to doctors appointments? family or friend will provide   Are you on dialysis? No   Do you take coumadin? No   DME Needed Upon Discharge  none   Discharge Plan discussed with: Adult children;Patient   Transition of Care Barriers None   Discharge Plan A Home Health   Discharge Plan B Home Health   Physical Activity   On average, how many days per week do you engage in moderate to strenuous exercise (like a brisk walk)? 0 days   On average, how many minutes do you engage in exercise at this level? 0 min   Financial Resource Strain   How hard is it for you to pay for the very basics  like food, housing, medical care, and heating? Not hard   Housing Stability   In the last 12 months, was there a time when you were not able to pay the mortgage or rent on time? N   In the last 12 months, how many places have you lived? 1   In the last 12 months, was there a time when you did not have a steady place to sleep or slept in a shelter (including now)? N   Transportation Needs   In the past 12 months, has lack of transportation kept you from medical appointments or from getting medications? no   In the past 12 months, has lack of transportation kept you from meetings, work, or from getting things needed for daily living? No   Food Insecurity   Within the past 12 months, you worried that your food would run out before you got the money to buy more. Never true   Within the past 12 months, the food you bought just didn't last and you didn't have money to get more. Never true   Stress   Do you feel stress - tense, restless, nervous, or anxious, or unable to sleep at night because your mind is troubled all the time - these days? Only a littl   Social Connections   In a typical week, how many times do you talk on the phone with family, friends, or neighbors? More than 3   How often do you get together with friends or relatives? More than 3   How often do you attend Druze or Mormonism services? Never   Do you belong to any clubs or organizations such as Druze groups, unions, fraternal or athletic groups, or school groups? No   How often do you attend meetings of the clubs or organizations you belong to? Never   Are you , , , , never , or living with a partner?    Alcohol Use   Q1: How often do you have a drink containing alcohol? Never   Q2: How many drinks containing alcohol do you have on a typical day when you are drinking? None   Q3: How often do you have six or more drinks on one occasion? Never   OTHER   Name(s) of People in Home Spouse and adult son      Patient off of unit at time of assessment, CM spoke with daughter (Silvia) at bedside. Patient lives with spouse and adult son also lives in the home. Daughter states patient was with home health services in the past and would like to continue with services. Daughter cannot recall agency her mother was with, patient returned to the floor during assessment and was also unable to recall agency. FOC obtained and placed in chart. Referral sent to [facility] via [facility]port.

## 2023-10-20 NOTE — CONSULTS
Gastroenterology Consultation Note    Reason for Consult:  Melena     PCP:   Marcia Vaughan FNP    History of Present Illness (HPI):  83-year-old female unknown to our group with past medical history of type 2 diabetes mellitus, HTN, CKD, TIA on ASA, chronic hep B on Vemlidy, and hemorrhoids who presented to the ED with complaints of melena associated with dizziness and lightheadedness.    On arrival, patient hypertensive but otherwise stable with labs notable for H&H 12.3/37.9 trending down to 10.6/32.5 this morning.  Baseline 11-12 per chart review.  Rectal exam in the ED with evidence of melanotic guaiac-positive stool.    Patient reports initial episode of melena last week that self-resolved and stools returned back to baseline.  Symptoms then returned yesterday with an episode of large volume loose black stool associated with dizziness, lightheadedness and abdominal pain.  She denies any previous episodes prior to last week.  She is on ASA only at home, however ran out and has not been taking for the past 3 weeks.  Denies regular NSAID use.  Family at bedside also reports episode of nonbloody emesis yesterday.    She is not routinely seen by a gastroenterologist.  She denies any prior EGD or colonoscopy.    On exam today, she reports mild epigastric and lower abdominal pain but is otherwise without complaints.  No further episodes of overt GI bleeding since admit.      ROS:  Review of Systems   Constitutional:  Positive for malaise/fatigue. Negative for chills and fever.   HENT:  Negative for sore throat.    Respiratory:  Negative for shortness of breath, wheezing and stridor.    Gastrointestinal:  Positive for abdominal pain, diarrhea, melena and vomiting. Negative for heartburn.   Musculoskeletal:  Negative for joint pain.   Skin:  Negative for itching and rash.   Neurological:  Positive for weakness. Negative for seizures and loss of consciousness.   Psychiatric/Behavioral:  The patient is not  nervous/anxious.        Medical History:   Past Medical History:   Diagnosis Date    Chronic hepatitis B     DM (diabetes mellitus)     HLD (hyperlipidemia)     HTN (hypertension)     OA (osteoarthritis)     TIA (transient ischemic attack)        Surgical History:   Past Surgical History:   Procedure Laterality Date     SECTION      HYSTERECTOMY      JOINT REPLACEMENT         Family History:   History reviewed. No pertinent family history..     Social History:   Social History     Tobacco Use    Smoking status: Former    Smokeless tobacco: Never    Tobacco comments:     states a pack would last a week, quit 50 yrs ago   Substance Use Topics    Alcohol use: Not Currently       Allergies:  Review of patient's allergies indicates:  No Known Allergies    Medications Prior to Admission   Medication Sig Dispense Refill Last Dose    amLODIPine (NORVASC) 5 MG tablet amlodipine 5 mg tablet   TAKE 1 TABLET BY MOUTH ONCE DAILY   10/19/2023 at 0900    aspirin (ECOTRIN) 81 MG EC tablet Adult Low Dose Aspirin 81 mg tablet,delayed release   Take 1 tablet every day by oral route.   Past Month    metoprolol tartrate (LOPRESSOR) 25 MG tablet Take 25 mg by mouth 2 (two) times daily.   10/19/2023 at 0900    omeprazole (PRILOSEC) 40 MG capsule 1 capsule 30 minutes before morning meal   10/19/2023 at 0900    pioglitazone (ACTOS) 30 MG tablet Take 30 mg by mouth.   10/19/2023 at 0900    simvastatin (ZOCOR) 20 MG tablet simvastatin 20 mg tablet   TAKE 1 TABLET BY MOUTH ONCE DAILY AT BEDTIME   10/19/2023 at 2100    tenofovir alafenamide (VEMLIDY) 25 mg Tab Vemlidy 25 mg tablet  TAKE 1 TABLET BY MOUTH ONCE DAILY 90 tablet 1 10/19/2023 at 0900    albuterol (PROVENTIL/VENTOLIN HFA) 90 mcg/actuation inhaler albuterol sulfate HFA 90 mcg/actuation aerosol inhaler   Inhale 1 puff as needed by inhalation route for 17 days.   Unknown    diclofenac sodium (VOLTAREN) 1 % Gel Apply 2 g topically once daily.   Unknown         Objective  Findings:    Vital Signs:  /69   Pulse 72   Temp 97.9 °F (36.6 °C) (Oral)   Resp 18   Ht 5' (1.524 m)   Wt 77.6 kg (171 lb 1.2 oz)   SpO2 100%   Breastfeeding No   BMI 33.41 kg/m²   Body mass index is 33.41 kg/m².    Physical Exam:  Physical Exam  Constitutional:       General: She is not in acute distress.  HENT:      Head: Normocephalic and atraumatic.      Nose: Nose normal.   Eyes:      General: No scleral icterus.  Cardiovascular:      Pulses: Normal pulses.   Pulmonary:      Effort: No respiratory distress.      Breath sounds: No stridor.   Abdominal:      General: There is no distension.      Palpations: Abdomen is soft.      Tenderness: There is abdominal tenderness (epigastric, lower abd). There is no guarding.      Comments: Lower abd midline scar s/p hysterectomy    Skin:     General: Skin is warm and dry.      Coloration: Skin is not jaundiced or pale.   Neurological:      General: No focal deficit present.      Mental Status: She is alert and oriented to person, place, and time. Mental status is at baseline.   Psychiatric:         Behavior: Behavior normal.         Thought Content: Thought content normal.         Judgment: Judgment normal.         Labs:  Recent Results (from the past 24 hour(s))   Comprehensive metabolic panel    Collection Time: 10/19/23  2:28 PM   Result Value Ref Range    Sodium Level 139 136 - 145 mmol/L    Potassium Level 5.1 3.5 - 5.1 mmol/L    Chloride 109 (H) 98 - 107 mmol/L    Carbon Dioxide 21 (L) 23 - 31 mmol/L    Glucose Level 121 (H) 82 - 115 mg/dL    Blood Urea Nitrogen 29.1 (H) 9.8 - 20.1 mg/dL    Creatinine 1.54 (H) 0.55 - 1.02 mg/dL    Calcium Level Total 9.4 8.4 - 10.2 mg/dL    Protein Total 7.8 (H) 5.8 - 7.6 gm/dL    Albumin Level 4.0 3.4 - 4.8 g/dL    Globulin 3.8 (H) 2.4 - 3.5 gm/dL    Albumin/Globulin Ratio 1.1 1.1 - 2.0 ratio    Bilirubin Total 0.6 <=1.5 mg/dL    Alkaline Phosphatase 76 40 - 150 unit/L    Alanine Aminotransferase 8 0 - 55 unit/L     Aspartate Aminotransferase 18 5 - 34 unit/L    eGFR 33 mls/min/1.73/m2   APTT    Collection Time: 10/19/23  2:28 PM   Result Value Ref Range    PTT 26.7 23.2 - 33.7 seconds   Protime-INR    Collection Time: 10/19/23  2:28 PM   Result Value Ref Range    PT 14.2 12.5 - 14.5 seconds    INR 1.1 <=1.3   Troponin I    Collection Time: 10/19/23  2:28 PM   Result Value Ref Range    Troponin-I 0.023 0.000 - 0.045 ng/mL   CBC with Differential    Collection Time: 10/19/23  2:28 PM   Result Value Ref Range    WBC 9.28 4.50 - 11.50 x10(3)/mcL    RBC 4.18 (L) 4.20 - 5.40 x10(6)/mcL    Hgb 12.3 12.0 - 16.0 g/dL    Hct 37.9 37.0 - 47.0 %    MCV 90.7 80.0 - 94.0 fL    MCH 29.4 27.0 - 31.0 pg    MCHC 32.5 (L) 33.0 - 36.0 g/dL    RDW 12.6 11.5 - 17.0 %    Platelet 217 130 - 400 x10(3)/mcL    MPV 12.6 (H) 7.4 - 10.4 fL    Neut % 76.4 %    Lymph % 13.9 %    Mono % 8.2 %    Eos % 0.8 %    Basophil % 0.3 %    Lymph # 1.29 0.6 - 4.6 x10(3)/mcL    Neut # 7.09 2.1 - 9.2 x10(3)/mcL    Mono # 0.76 0.1 - 1.3 x10(3)/mcL    Eos # 0.07 0 - 0.9 x10(3)/mcL    Baso # 0.03 <=0.2 x10(3)/mcL    IG# 0.04 0 - 0.04 x10(3)/mcL    IG% 0.4 %    NRBC% 0.0 %   Urinalysis, Reflex to Urine Culture    Collection Time: 10/19/23  3:01 PM    Specimen: Urine   Result Value Ref Range    Color, UA Yellow Yellow, Light-Yellow, Dark Yellow, Zoya, Straw    Appearance, UA Clear Clear    Specific Gravity, UA 1.008 1.005 - 1.030    pH, UA 5.5 5.0 - 8.5    Protein, UA Negative Negative    Glucose, UA Negative Negative, Normal    Ketones, UA Negative Negative    Blood, UA 2+ (A) Negative    Bilirubin, UA Negative Negative    Urobilinogen, UA 1.0 0.2, 1.0, Normal    Nitrites, UA Negative Negative    Leukocyte Esterase, UA Trace (A) Negative   Urinalysis, Microscopic    Collection Time: 10/19/23  3:01 PM   Result Value Ref Range    RBC, UA <5 <=5 /HPF    WBC, UA <5 <=5 /HPF    Squamous Epithelial Cells, UA <5 <=5 /HPF    Bacteria, UA None Seen None Seen, Rare, Occasional /HPF    Hemoglobin and Hematocrit    Collection Time: 10/19/23  9:43 PM   Result Value Ref Range    Hgb 11.6 (L) 12.0 - 16.0 g/dL    Hct 34.9 (L) 37.0 - 47.0 %   POCT glucose    Collection Time: 10/19/23 10:51 PM   Result Value Ref Range    POCT Glucose 107 70 - 110 mg/dL   CBC Without Differential    Collection Time: 10/20/23  4:26 AM   Result Value Ref Range    WBC 6.32 4.50 - 11.50 x10(3)/mcL    RBC 3.62 (L) 4.20 - 5.40 x10(6)/mcL    Hgb 10.6 (L) 12.0 - 16.0 g/dL    Hct 32.5 (L) 37.0 - 47.0 %    MCV 89.8 80.0 - 94.0 fL    MCH 29.3 27.0 - 31.0 pg    MCHC 32.6 (L) 33.0 - 36.0 g/dL    RDW 12.5 11.5 - 17.0 %    Platelet 135 130 - 400 x10(3)/mcL    MPV 13.6 (H) 7.4 - 10.4 fL    NRBC% 0.0 %   Basic Metabolic Panel    Collection Time: 10/20/23  4:26 AM   Result Value Ref Range    Sodium Level 143 136 - 145 mmol/L    Potassium Level 4.3 3.5 - 5.1 mmol/L    Chloride 110 (H) 98 - 107 mmol/L    Carbon Dioxide 23 23 - 31 mmol/L    Glucose Level 115 82 - 115 mg/dL    Blood Urea Nitrogen 23.5 (H) 9.8 - 20.1 mg/dL    Creatinine 1.38 (H) 0.55 - 1.02 mg/dL    BUN/Creatinine Ratio 17     Calcium Level Total 9.0 8.4 - 10.2 mg/dL    Anion Gap 10.0 mEq/L    eGFR 38 mls/min/1.73/m2   Magnesium    Collection Time: 10/20/23  4:26 AM   Result Value Ref Range    Magnesium Level 2.10 1.60 - 2.60 mg/dL   Phosphorus    Collection Time: 10/20/23  4:26 AM   Result Value Ref Range    Phosphorus Level 4.3 2.3 - 4.7 mg/dL   Hemoglobin A1c if not done in past 3 months    Collection Time: 10/20/23  4:26 AM   Result Value Ref Range    Hemoglobin A1c 5.8 <=7.0 %    Estimated Average Glucose 119.8 mg/dL   POCT glucose    Collection Time: 10/20/23  5:13 AM   Result Value Ref Range    POCT Glucose 102 70 - 110 mg/dL       No orders to display     Assessment/Plan:  83-year-old female unknown to our group with past medical history of type 2 diabetes mellitus, HTN, CKD, TIA on ASA, chronic hep B on Vemlidy, and hemorrhoids who presented to the ED with complaints  of melena associated with dizziness and lightheadedness.    Melena  Anemia    -hgb 11.6--10.6  - keep NPO for EGD today         Thank you for allowing us to participate in the care of Tonie FAJARDO Mallory.    KESHIA ElkinsC  Gastroenterology  [facility]

## 2023-10-20 NOTE — PROGRESS NOTES
[facility]  Hospital Medicine Progress Note        Chief Complaint: Inpatient Follow-up for     Subjective:  This is an 83-year-old female medical history of T2DM, HTN, HLD, CKD 3b, TIA on aspirin, osteoarthritis, chronic hepatitis-B on Vemlidy, hemorrhoids.     Present to the ED with complaint of melena/black stool.  Report she had an episode of black stool last week that resolved on its own.  Today had an episode where she felt dizzy/lightheaded with diffuse abdominal pain and large loose black bowel movements.  Subsequently she felt back to her baseline and abdominal pain subsided.  Report she has not been taking her aspirin for the past 3 weeks/ran out.  Denies oral NSAID use.  Denies prior EGD or colonoscopy.  Report prior history of intermittent hemorrhoidal bleed.     On arrival to ED she was afebrile and hemodynamically stable.  Labs notable for hemoglobin 12.3 > 11.6.  Rectal exam by ED physician show melanotic guaiac-positive stool.     She was given Protonix 80 mg IV, GI consulted and referred to hospital medicine service for further evaluation and management.    Objective/physical exam:  General: Appears comfortable  HEENT: NC/AT  Neck:  No JVD  Chest: CTABL  CVS: Regular rhythm. Normal S1/S2.  Abdomen: nondistended, normoactive BS, soft and non-tender.  MSK: No obvious deformity or joint swelling  Skin: Warm and dry  Neuro: AAOx3, no focal neurological deficit    VITAL SIGNS: 24 HRS MIN & MAX LAST   Temp  Min: 97.6 °F (36.4 °C)  Max: 98.1 °F (36.7 °C) 97.7 °F (36.5 °C)   BP  Min: 110/61  Max: 160/70 120/65   Pulse  Min: 62  Max: 74  67   Resp  Min: 15  Max: 22 18   SpO2  Min: 97 %  Max: 100 % 99 %       Labs, Microbiology and Imaging were Reviewed.      Microbiology Results (last 7 days)       ** No results found for the last 168 hours. **               Medications   amLODIPine  5 mg Oral Daily    LIDOcaine (PF) 20 mg/mL (2%)        metoprolol tartrate  25 mg Oral BID     pantoprazole  40 mg Intravenous BID    pravastatin  40 mg Oral Daily    propofol        sodium,potassium,mag sulfates  1 kit Oral Once    tenofovir alafenamide  25 mg Oral Daily        acetaminophen, acetaminophen, albuterol, aluminum-magnesium hydroxide-simethicone, cloNIDine, dextrose 10%, dextrose 10%, glucagon (human recombinant), hydrALAZINE, insulin aspart U-100, labetalol, LIDOcaine (PF) 20 mg/mL (2%), melatonin, ondansetron, polyethylene glycol, prochlorperazine, propofol, senna-docusate 8.6-50 mg, sodium chloride 0.9%     Radiology:  US Abdomen Limited  Narrative: EXAMINATION:  US ABDOMEN LIMITED    CLINICAL HISTORY:  Chronic viral hepatitis B without delta-agent    TECHNIQUE:  Limited ultrasound of the right upper quadrant of the abdomen was performed.    COMPARISON:  10/22/2021    FINDINGS:  LIMITATIONS: Exam limited due to poor acoustic window related to shadowing bowel gas and/or body habitus.    LIVER: 13.8 cm cranial caudal at the midclavicular line. Normal echotexture. No focal mass appreciable. Portal vein is patent with appropriate directional flow.    PANCREAS: Obscured by overlying bowel gas.    GALLBLADDER: No shadowing calculi, wall thickening, or pericholecystic fluid.    BILE DUCTS: 6 mm common bile duct.  Distal common bile duct is obscured by shadowing bowel gas.    INFERIOR VENA CAVA: Normal in appearance.    RIGHT KIDNEY: 9.5 cm. No hydronephrosis.    OTHER: No ascites.  Impression: No significant abnormality.    Electronically signed by: Wendy Bragg  Date:    02/14/2023  Time:    12:53          Assessment/Plan:  Acute GI bleed/melena  CKD 3b -at baseline     History of chronic hepatitis-B on Vemlidy, HTN, HLD, T2DM, TIA on aspirin, osteoarthritis    Seen and examined the patient.  Afebrile vitals stable hemodynamically stable denied pain or discomfort.    EGD is unrevealing.  GI put an order for colonoscopy.    Patient has mild abdominal pain.  Continue Protonix for now.    All  diagnosis and differential diagnosis have been reviewed; assessment and plan has been documented; I have personally reviewed the labs and test results that are presently available; I have reviewed the patients medication list; I have reviewed the consulting providers response and recommendations. I have reviewed or attempted to review medical records based upon their availability.       Jerald Bowers MD   10/20/2023

## 2023-10-20 NOTE — H&P
[facility] Hospital Medicine - H&P Note    Patient Name: Tonie Tejada  MRN: [id number]  PCP: Marcia Vaughan FNP  Admitting Physician: Lashawn Chaves MD  Admission Class: IP- Inpatient   Date of Service: 10/19/2023  Code status: Full    Chief Complaint   Melena    History of Present Illness   This is an 83-year-old female medical history of T2DM, HTN, HLD, CKD 3b, TIA on aspirin, osteoarthritis, chronic hepatitis-B on Vemlidy, hemorrhoids.    Present to the ED with complaint of melena/black stool.  Report she had an episode of black stool last week that resolved on its own.  Today had an episode where she felt dizzy/lightheaded with diffuse abdominal pain and large loose black bowel movements.  Subsequently she felt back to her baseline and abdominal pain subsided.  Report she has not been taking her aspirin for the past 3 weeks/ran out.  Denies oral NSAID use.  Denies prior EGD or colonoscopy.  Report prior history of intermittent hemorrhoidal bleed.    On arrival to ED she was afebrile and hemodynamically stable.  Labs notable for hemoglobin 12.3 > 11.6.  Rectal exam by ED physician show melanotic guaiac-positive stool.    She was given Protonix 80 mg IV, GI consulted and referred to hospital medicine service for further evaluation and management.    ROS   Except as documented, all other systems reviewed and negative     Past Medical History   T2DM  HTN  HLD  CKD 3b  TIA on aspirin  Chronic hepatitis B  On Vemlidy  US abdomen 2023:  Normal liver echotexture no focal masses normal portal vein with appropriate directional flow.  AFP 2023: < 2.0  Osteoarthritis- use topical Voltaren gel  Hemorrhoids/intermittent bleeding  GERD    Past Surgical History     Hysterectomy  Hip replacement  Angiogram     Social History     Social History     Tobacco Use    Smoking status: Former    Smokeless tobacco: Never    Tobacco comments:     states a pack would last a week,  quit 50 yrs ago   Substance Use Topics    Alcohol use: Not Currently        Family History   Reviewed and negative    Allergies   Patient has no known allergies.    Home Medications     Prior to Admission medications    Medication Sig Start Date End Date Taking? Authorizing Provider   amLODIPine (NORVASC) 5 MG tablet amlodipine 5 mg tablet   TAKE 1 TABLET BY MOUTH ONCE DAILY   Yes Provider, Historical   aspirin (ECOTRIN) 81 MG EC tablet Adult Low Dose Aspirin 81 mg tablet,delayed release   Take 1 tablet every day by oral route.   Yes Provider, Historical   metoprolol tartrate (LOPRESSOR) 25 MG tablet Take 25 mg by mouth 2 (two) times daily. 8/28/23  Yes Provider, Historical   omeprazole (PRILOSEC) 40 MG capsule 1 capsule 30 minutes before morning meal   Yes Provider, Historical   pioglitazone (ACTOS) 30 MG tablet Take 30 mg by mouth. 8/28/23  Yes Provider, Historical   simvastatin (ZOCOR) 20 MG tablet simvastatin 20 mg tablet   TAKE 1 TABLET BY MOUTH ONCE DAILY AT BEDTIME   Yes Provider, Historical   tenofovir alafenamide (VEMLIDY) 25 mg Tab Vemlidy 25 mg tablet  TAKE 1 TABLET BY MOUTH ONCE DAILY 9/6/23  Yes Rhianna Pierce APRN   albuterol (PROVENTIL/VENTOLIN HFA) 90 mcg/actuation inhaler albuterol sulfate HFA 90 mcg/actuation aerosol inhaler   Inhale 1 puff as needed by inhalation route for 17 days.    Provider, Historical   diclofenac sodium (VOLTAREN) 1 % Gel Apply 2 g topically once daily.    Provider, Historical       Physical Exam   Vital Signs  Temp:  [98 °F (36.7 °C)-98.2 °F (36.8 °C)]   Pulse:  [68-73]   Resp:  [19-22]   BP: (150-160)/(69-73)   SpO2:  [99 %]    General: Appears comfortable  HEENT: NC/AT  Neck:  No JVD  Chest: CTABL  CVS: Regular rhythm. Normal S1/S2.  Abdomen: nondistended, normoactive BS, soft and non-tender.  MSK: No obvious deformity or joint swelling  Skin: Warm and dry  Neuro: AAOx3, no focal neurological deficit  Psych: Cooperative    Labs     Recent Labs     10/19/23  4348    WBC 9.28   RBC 4.18*   HGB 12.3   HCT 37.9   MCV 90.7   MCH 29.4   MCHC 32.5*   RDW 12.6        Recent Labs     10/19/23  1428   PROTIME 14.2   INR 1.1   PTT 26.7      Recent Labs     10/19/23  1428      K 5.1   CHLORIDE 109*   CO2 21*   BUN 29.1*   CREATININE 1.54*   EGFRNORACEVR 33   GLUCOSE 121*   CALCIUM 9.4   ALBUMIN 4.0   GLOBULIN 3.8*   ALKPHOS 76   ALT 8   AST 18   BILITOT 0.6     Recent Labs     10/19/23  1428   TROPONINI 0.023        Imaging     No orders to display     Assessment   Acute GI bleed/melena  CKD 3b -at baseline    History of chronic hepatitis-B on Vemlidy, HTN, HLD, T2DM, TIA on aspirin, osteoarthritis      Plan   Clear liquid diet  NPO at midnight except medication, sips, ice chips  Protonix 40 mg IV Q12H  Hold aspirin  GI consulted  Remaining home meds reviewed and resumed.  VTE Prophylaxis:  SCDs    Lashawn Chaves MD  Internal Medicine

## 2023-10-20 NOTE — TRANSFER OF CARE
Anesthesia Transfer of Care Note    Patient: Tonie Tejada    Procedure(s) Performed: Procedure(s) (LRB):  EGD (N/A)    Patient location: PACU    Anesthesia Type: MAC    Transport from OR: Transported from OR on room air with adequate spontaneous ventilation    Post pain: adequate analgesia    Post assessment: no apparent anesthetic complications and tolerated procedure well    Post vital signs: stable    Level of consciousness: responds to stimulation    Nausea/Vomiting: no nausea/vomiting    Complications: none    Transfer of care protocol was followed      Last vitals:   Visit Vitals  /77   Pulse 62   Temp 36.5 °C (97.7 °F)   Resp 15   Ht 5' (1.524 m)   Wt 77.6 kg (171 lb 1.2 oz)   SpO2 98%   Breastfeeding No   BMI 33.41 kg/m²

## 2023-10-21 ENCOUNTER — ANESTHESIA EVENT (OUTPATIENT)
Dept: SURGERY | Facility: HOSPITAL | Age: 83
DRG: 378 | End: 2023-10-21
Payer: MEDICARE

## 2023-10-21 ENCOUNTER — ANESTHESIA (OUTPATIENT)
Dept: SURGERY | Facility: HOSPITAL | Age: 83
DRG: 378 | End: 2023-10-21
Payer: MEDICARE

## 2023-10-21 LAB
ALBUMIN SERPL-MCNC: 3.6 G/DL (ref 3.4–4.8)
ALBUMIN/GLOB SERPL: 1.2 RATIO (ref 1.1–2)
ALP SERPL-CCNC: 69 UNIT/L (ref 40–150)
ALT SERPL-CCNC: 12 UNIT/L (ref 0–55)
AST SERPL-CCNC: 19 UNIT/L (ref 5–34)
BASOPHILS # BLD AUTO: 0.04 X10(3)/MCL
BASOPHILS NFR BLD AUTO: 0.6 %
BILIRUB SERPL-MCNC: 0.8 MG/DL
BUN SERPL-MCNC: 19.3 MG/DL (ref 9.8–20.1)
CALCIUM SERPL-MCNC: 8.9 MG/DL (ref 8.4–10.2)
CHLORIDE SERPL-SCNC: 109 MMOL/L (ref 98–107)
CO2 SERPL-SCNC: 21 MMOL/L (ref 23–31)
CREAT SERPL-MCNC: 1.33 MG/DL (ref 0.55–1.02)
EOSINOPHIL # BLD AUTO: 0.18 X10(3)/MCL (ref 0–0.9)
EOSINOPHIL NFR BLD AUTO: 2.5 %
ERYTHROCYTE [DISTWIDTH] IN BLOOD BY AUTOMATED COUNT: 12.5 % (ref 11.5–17)
GFR SERPLBLD CREATININE-BSD FMLA CKD-EPI: 40 MLS/MIN/1.73/M2
GLOBULIN SER-MCNC: 3.1 GM/DL (ref 2.4–3.5)
GLUCOSE SERPL-MCNC: 91 MG/DL (ref 82–115)
HCT VFR BLD AUTO: 38.9 % (ref 37–47)
HGB BLD-MCNC: 12.7 G/DL (ref 12–16)
IMM GRANULOCYTES # BLD AUTO: 0.02 X10(3)/MCL (ref 0–0.04)
IMM GRANULOCYTES NFR BLD AUTO: 0.3 %
LYMPHOCYTES # BLD AUTO: 1.93 X10(3)/MCL (ref 0.6–4.6)
LYMPHOCYTES NFR BLD AUTO: 27.1 %
MAGNESIUM SERPL-MCNC: 2.1 MG/DL (ref 1.6–2.6)
MCH RBC QN AUTO: 29.3 PG (ref 27–31)
MCHC RBC AUTO-ENTMCNC: 32.6 G/DL (ref 33–36)
MCV RBC AUTO: 89.8 FL (ref 80–94)
MONOCYTES # BLD AUTO: 0.65 X10(3)/MCL (ref 0.1–1.3)
MONOCYTES NFR BLD AUTO: 9.1 %
NEUTROPHILS # BLD AUTO: 4.3 X10(3)/MCL (ref 2.1–9.2)
NEUTROPHILS NFR BLD AUTO: 60.4 %
NRBC BLD AUTO-RTO: 0 %
PLATELET # BLD AUTO: 246 X10(3)/MCL (ref 130–400)
PMV BLD AUTO: 12.6 FL (ref 7.4–10.4)
POTASSIUM SERPL-SCNC: 4.3 MMOL/L (ref 3.5–5.1)
PROT SERPL-MCNC: 6.7 GM/DL (ref 5.8–7.6)
RBC # BLD AUTO: 4.33 X10(6)/MCL (ref 4.2–5.4)
SODIUM SERPL-SCNC: 140 MMOL/L (ref 136–145)
WBC # SPEC AUTO: 7.12 X10(3)/MCL (ref 4.5–11.5)

## 2023-10-21 PROCEDURE — D9220A PRA ANESTHESIA: ICD-10-PCS | Mod: CRNA,,,

## 2023-10-21 PROCEDURE — 25000003 PHARM REV CODE 250: Performed by: INTERNAL MEDICINE

## 2023-10-21 PROCEDURE — 25000003 PHARM REV CODE 250

## 2023-10-21 PROCEDURE — D9220A PRA ANESTHESIA: Mod: ANES,,, | Performed by: ANESTHESIOLOGY

## 2023-10-21 PROCEDURE — A4216 STERILE WATER/SALINE, 10 ML: HCPCS | Performed by: INTERNAL MEDICINE

## 2023-10-21 PROCEDURE — 85025 COMPLETE CBC W/AUTO DIFF WBC: CPT | Performed by: STUDENT IN AN ORGANIZED HEALTH CARE EDUCATION/TRAINING PROGRAM

## 2023-10-21 PROCEDURE — C9113 INJ PANTOPRAZOLE SODIUM, VIA: HCPCS | Performed by: PHYSICIAN ASSISTANT

## 2023-10-21 PROCEDURE — 80053 COMPREHEN METABOLIC PANEL: CPT | Performed by: STUDENT IN AN ORGANIZED HEALTH CARE EDUCATION/TRAINING PROGRAM

## 2023-10-21 PROCEDURE — 37000008 HC ANESTHESIA 1ST 15 MINUTES: Performed by: INTERNAL MEDICINE

## 2023-10-21 PROCEDURE — 11000001 HC ACUTE MED/SURG PRIVATE ROOM

## 2023-10-21 PROCEDURE — 63600175 PHARM REV CODE 636 W HCPCS: Performed by: PHYSICIAN ASSISTANT

## 2023-10-21 PROCEDURE — D9220A PRA ANESTHESIA: Mod: CRNA,,,

## 2023-10-21 PROCEDURE — 37000009 HC ANESTHESIA EA ADD 15 MINS: Performed by: INTERNAL MEDICINE

## 2023-10-21 PROCEDURE — 63600175 PHARM REV CODE 636 W HCPCS

## 2023-10-21 PROCEDURE — 45385 COLONOSCOPY W/LESION REMOVAL: CPT | Performed by: INTERNAL MEDICINE

## 2023-10-21 PROCEDURE — 88305 TISSUE EXAM BY PATHOLOGIST: CPT | Performed by: INTERNAL MEDICINE

## 2023-10-21 PROCEDURE — 83735 ASSAY OF MAGNESIUM: CPT | Performed by: STUDENT IN AN ORGANIZED HEALTH CARE EDUCATION/TRAINING PROGRAM

## 2023-10-21 PROCEDURE — D9220A PRA ANESTHESIA: ICD-10-PCS | Mod: ANES,,, | Performed by: ANESTHESIOLOGY

## 2023-10-21 RX ORDER — LIDOCAINE HYDROCHLORIDE 10 MG/ML
1 INJECTION, SOLUTION EPIDURAL; INFILTRATION; INTRACAUDAL; PERINEURAL ONCE
Status: CANCELLED | OUTPATIENT
Start: 2023-10-21 | End: 2023-10-21

## 2023-10-21 RX ORDER — SODIUM CHLORIDE, SODIUM GLUCONATE, SODIUM ACETATE, POTASSIUM CHLORIDE AND MAGNESIUM CHLORIDE 30; 37; 368; 526; 502 MG/100ML; MG/100ML; MG/100ML; MG/100ML; MG/100ML
INJECTION, SOLUTION INTRAVENOUS CONTINUOUS
Status: CANCELLED | OUTPATIENT
Start: 2023-10-21 | End: 2023-11-20

## 2023-10-21 RX ORDER — PROPOFOL 10 MG/ML
VIAL (ML) INTRAVENOUS
Status: DISCONTINUED | OUTPATIENT
Start: 2023-10-21 | End: 2023-10-21

## 2023-10-21 RX ORDER — LIDOCAINE HYDROCHLORIDE 20 MG/ML
INJECTION, SOLUTION EPIDURAL; INFILTRATION; INTRACAUDAL; PERINEURAL
Status: DISCONTINUED | OUTPATIENT
Start: 2023-10-21 | End: 2023-10-21

## 2023-10-21 RX ADMIN — PROPOFOL 50 MG: 10 INJECTION, EMULSION INTRAVENOUS at 12:10

## 2023-10-21 RX ADMIN — METOPROLOL TARTRATE 25 MG: 25 TABLET, FILM COATED ORAL at 08:10

## 2023-10-21 RX ADMIN — PRAVASTATIN SODIUM 40 MG: 40 TABLET ORAL at 08:10

## 2023-10-21 RX ADMIN — METOPROLOL TARTRATE 25 MG: 25 TABLET, FILM COATED ORAL at 09:10

## 2023-10-21 RX ADMIN — ACETAMINOPHEN 1000 MG: 500 TABLET, FILM COATED ORAL at 09:10

## 2023-10-21 RX ADMIN — PANTOPRAZOLE SODIUM 40 MG: 40 INJECTION, POWDER, FOR SOLUTION INTRAVENOUS at 08:10

## 2023-10-21 RX ADMIN — PROPOFOL 50 MG: 10 INJECTION, EMULSION INTRAVENOUS at 11:10

## 2023-10-21 RX ADMIN — SODIUM CHLORIDE: 9 INJECTION, SOLUTION INTRAVENOUS at 11:10

## 2023-10-21 RX ADMIN — LIDOCAINE HYDROCHLORIDE 80 ML: 20 INJECTION, SOLUTION INTRAVENOUS at 11:10

## 2023-10-21 RX ADMIN — AMLODIPINE BESYLATE 5 MG: 5 TABLET ORAL at 08:10

## 2023-10-21 RX ADMIN — Medication 6 MG: at 09:10

## 2023-10-21 RX ADMIN — Medication 10 ML: at 09:10

## 2023-10-21 RX ADMIN — PANTOPRAZOLE SODIUM 40 MG: 40 INJECTION, POWDER, FOR SOLUTION INTRAVENOUS at 09:10

## 2023-10-21 NOTE — TRANSFER OF CARE
Anesthesia Transfer of Care Note    Patient: Tonie Tejada    Procedure(s) Performed: Procedure(s) (LRB):  COLONOSCOPY (N/A)  COLONOSCOPY, WITH POLYPECTOMY USING SNARE    Patient location: PACU    Anesthesia Type: general and MAC    Transport from OR: Transported from OR on room air with adequate spontaneous ventilation    Post pain: adequate analgesia    Post assessment: no apparent anesthetic complications    Post vital signs: stable    Level of consciousness: responds to stimulation and sedated    Nausea/Vomiting: no nausea/vomiting    Complications: none    Transfer of care protocol was followed      Last vitals:   Visit Vitals  /66   Pulse 79   Temp 36.6 °C (97.9 °F)   Resp 17   Ht 5' (1.524 m)   Wt 77.6 kg (171 lb 1.2 oz)   SpO2 98%   Breastfeeding No   BMI 33.41 kg/m²

## 2023-10-21 NOTE — ANESTHESIA PREPROCEDURE EVALUATION
10/21/2023  Tonie Tejada is a 83 y.o., female who pesents with Anemia and GI bleed/Melena.  Diagnosis:        Melena [K92.1]       Acute blood loss anemia [D62]    The pt. comes to [facility] endoscopy for the noted procedure under GA/TIVA.  Procedure: COLONOSCOPY (Abdomen)   Anesthesia type: General/MAC           PMHx:  Other Medical History   TIA (transient ischemic attack) DM (diabetes mellitus)   HTN (hypertension) HLD (hyperlipidemia)   Chronic hepatitis B OA (osteoarthritis)     Surgical History:  HYSTERECTOMY  SECTION   JOINT REPLACEMENT ESOPHAGOGASTRODUODENOSCOPY           Vital signs:  Pre Vitals     Current as of 10/21/23 1048  BP: 131/66 Pulse: 79   Resp: 17 SpO2: 98   Temp: 36.6 °C (97.9 °F)   Height: 5' (1.524 m) (10/19/23) Weight: 77.6 kg (171 lb 1.2 oz) (10/19/23)   BMI: 33.4 IBW: 45.5 kg (100 lb 4.9 oz)   Last edited 10/21/23 1039 by           Lab Data:      EKG:          Echo:        Pre-op Assessment    I have reviewed the Patient Summary Reports.     I have reviewed the Nursing Notes. I have reviewed the NPO Status.   I have reviewed the Medications.     Review of Systems  Anesthesia Hx:  No problems with previous Anesthesia    Social:  Non-Smoker    Hematology/Oncology:     Oncology Normal    -- Anemia:   EENT/Dental:EENT/Dental Normal   Cardiovascular:   Exercise tolerance: good Hypertension  Functional Capacity good / => 4 METS    Pulmonary:   Shortness of breath    Renal/:  Renal/ Normal     Hepatic/GI:   PUD, GERD Liver Disease, GI Bleed/Melena   Musculoskeletal:   Arthritis     Neurological:   TIA,    Endocrine:   Diabetes    Dermatological:  Skin Normal    Psych:  Psychiatric Normal           Physical Exam  General: Alert, Oriented, Well nourished and Cooperative    Airway:  Mallampati: II   Mouth Opening: Normal  TM Distance: Normal  Tongue: Normal  Neck ROM:  Normal ROM    Dental:  Intact    Chest/Lungs:  Clear to auscultation, Normal Respiratory Rate    Heart:  Rate: Normal  Rhythm: Regular Rhythm        Anesthesia Plan  Type of Anesthesia, risks & benefits discussed:    Anesthesia Type: Gen Natural Airway  Intra-op Monitoring Plan: Standard ASA Monitors  Induction:  IV  Informed Consent: Informed consent signed with the Patient and all parties understand the risks and agree with anesthesia plan.  All questions answered.   ASA Score: 3  Day of Surgery Review of History & Physical: H&P Update referred to the surgeon/provider.    Ready For Surgery From Anesthesia Perspective.     .

## 2023-10-22 LAB
ALBUMIN SERPL-MCNC: 3.4 G/DL (ref 3.4–4.8)
ALBUMIN/GLOB SERPL: 1.1 RATIO (ref 1.1–2)
ALP SERPL-CCNC: 67 UNIT/L (ref 40–150)
ALT SERPL-CCNC: 11 UNIT/L (ref 0–55)
APPEARANCE UR: ABNORMAL
AST SERPL-CCNC: 19 UNIT/L (ref 5–34)
BACTERIA #/AREA URNS AUTO: ABNORMAL /HPF
BASOPHILS # BLD AUTO: 0.02 X10(3)/MCL
BASOPHILS NFR BLD AUTO: 0.2 %
BILIRUB SERPL-MCNC: 0.9 MG/DL
BILIRUB UR QL STRIP.AUTO: NEGATIVE
BUN SERPL-MCNC: 22.9 MG/DL (ref 9.8–20.1)
CALCIUM SERPL-MCNC: 7.8 MG/DL (ref 8.4–10.2)
CHLORIDE SERPL-SCNC: 109 MMOL/L (ref 98–107)
CO2 SERPL-SCNC: 21 MMOL/L (ref 23–31)
COLOR UR AUTO: YELLOW
CREAT SERPL-MCNC: 1.89 MG/DL (ref 0.55–1.02)
EOSINOPHIL # BLD AUTO: 0.08 X10(3)/MCL (ref 0–0.9)
EOSINOPHIL NFR BLD AUTO: 1 %
ERYTHROCYTE [DISTWIDTH] IN BLOOD BY AUTOMATED COUNT: 12.7 % (ref 11.5–17)
GFR SERPLBLD CREATININE-BSD FMLA CKD-EPI: 26 MLS/MIN/1.73/M2
GLOBULIN SER-MCNC: 3.1 GM/DL (ref 2.4–3.5)
GLUCOSE SERPL-MCNC: 117 MG/DL (ref 82–115)
GLUCOSE UR QL STRIP.AUTO: NEGATIVE
HCT VFR BLD AUTO: 34.2 % (ref 37–47)
HGB BLD-MCNC: 11.4 G/DL (ref 12–16)
IMM GRANULOCYTES # BLD AUTO: 0.04 X10(3)/MCL (ref 0–0.04)
IMM GRANULOCYTES NFR BLD AUTO: 0.5 %
KETONES UR QL STRIP.AUTO: NEGATIVE
LEUKOCYTE ESTERASE UR QL STRIP.AUTO: ABNORMAL
LYMPHOCYTES # BLD AUTO: 1.44 X10(3)/MCL (ref 0.6–4.6)
LYMPHOCYTES NFR BLD AUTO: 17.6 %
MCH RBC QN AUTO: 29.3 PG (ref 27–31)
MCHC RBC AUTO-ENTMCNC: 33.3 G/DL (ref 33–36)
MCV RBC AUTO: 87.9 FL (ref 80–94)
MONOCYTES # BLD AUTO: 0.84 X10(3)/MCL (ref 0.1–1.3)
MONOCYTES NFR BLD AUTO: 10.2 %
NEUTROPHILS # BLD AUTO: 5.78 X10(3)/MCL (ref 2.1–9.2)
NEUTROPHILS NFR BLD AUTO: 70.5 %
NITRITE UR QL STRIP.AUTO: NEGATIVE
NRBC BLD AUTO-RTO: 0 %
PH UR STRIP.AUTO: 5.5 [PH]
PLATELET # BLD AUTO: 190 X10(3)/MCL (ref 130–400)
PMV BLD AUTO: 13 FL (ref 7.4–10.4)
POCT GLUCOSE: 102 MG/DL (ref 70–110)
POCT GLUCOSE: 128 MG/DL (ref 70–110)
POCT GLUCOSE: 183 MG/DL (ref 70–110)
POTASSIUM SERPL-SCNC: 3.8 MMOL/L (ref 3.5–5.1)
PROT SERPL-MCNC: 6.5 GM/DL (ref 5.8–7.6)
PROT UR QL STRIP.AUTO: NEGATIVE
RBC # BLD AUTO: 3.89 X10(6)/MCL (ref 4.2–5.4)
RBC #/AREA URNS AUTO: <5 /HPF
RBC UR QL AUTO: NEGATIVE
SODIUM SERPL-SCNC: 141 MMOL/L (ref 136–145)
SP GR UR STRIP.AUTO: 1.02 (ref 1–1.03)
SQUAMOUS #/AREA URNS AUTO: 12 /HPF
UROBILINOGEN UR STRIP-ACNC: 0.2
WBC # SPEC AUTO: 8.2 X10(3)/MCL (ref 4.5–11.5)
WBC #/AREA URNS AUTO: 29 /HPF

## 2023-10-22 PROCEDURE — 87088 URINE BACTERIA CULTURE: CPT | Performed by: INTERNAL MEDICINE

## 2023-10-22 PROCEDURE — 63600175 PHARM REV CODE 636 W HCPCS: Performed by: PHYSICIAN ASSISTANT

## 2023-10-22 PROCEDURE — 63600175 PHARM REV CODE 636 W HCPCS: Performed by: STUDENT IN AN ORGANIZED HEALTH CARE EDUCATION/TRAINING PROGRAM

## 2023-10-22 PROCEDURE — 85025 COMPLETE CBC W/AUTO DIFF WBC: CPT | Performed by: STUDENT IN AN ORGANIZED HEALTH CARE EDUCATION/TRAINING PROGRAM

## 2023-10-22 PROCEDURE — 81001 URINALYSIS AUTO W/SCOPE: CPT | Performed by: INTERNAL MEDICINE

## 2023-10-22 PROCEDURE — 11000001 HC ACUTE MED/SURG PRIVATE ROOM

## 2023-10-22 PROCEDURE — 80053 COMPREHEN METABOLIC PANEL: CPT | Performed by: STUDENT IN AN ORGANIZED HEALTH CARE EDUCATION/TRAINING PROGRAM

## 2023-10-22 PROCEDURE — 25000003 PHARM REV CODE 250: Performed by: INTERNAL MEDICINE

## 2023-10-22 PROCEDURE — 51798 US URINE CAPACITY MEASURE: CPT

## 2023-10-22 PROCEDURE — C9113 INJ PANTOPRAZOLE SODIUM, VIA: HCPCS | Performed by: PHYSICIAN ASSISTANT

## 2023-10-22 PROCEDURE — 25000003 PHARM REV CODE 250: Performed by: STUDENT IN AN ORGANIZED HEALTH CARE EDUCATION/TRAINING PROGRAM

## 2023-10-22 RX ADMIN — ACETAMINOPHEN 1000 MG: 500 TABLET, FILM COATED ORAL at 09:10

## 2023-10-22 RX ADMIN — PANTOPRAZOLE SODIUM 40 MG: 40 INJECTION, POWDER, FOR SOLUTION INTRAVENOUS at 09:10

## 2023-10-22 RX ADMIN — PRAVASTATIN SODIUM 40 MG: 40 TABLET ORAL at 09:10

## 2023-10-22 RX ADMIN — ACETAMINOPHEN 1000 MG: 500 TABLET, FILM COATED ORAL at 11:10

## 2023-10-22 RX ADMIN — AMLODIPINE BESYLATE 5 MG: 5 TABLET ORAL at 09:10

## 2023-10-22 RX ADMIN — Medication 6 MG: at 09:10

## 2023-10-22 RX ADMIN — METOPROLOL TARTRATE 25 MG: 25 TABLET, FILM COATED ORAL at 09:10

## 2023-10-22 RX ADMIN — CEFTRIAXONE SODIUM 1 G: 1 INJECTION, POWDER, FOR SOLUTION INTRAMUSCULAR; INTRAVENOUS at 10:10

## 2023-10-22 NOTE — NURSING
"Called to patient's room by daughter. States "She's not responding, I think she had a TIA." Patient assessed. Noted laying with HOB flat, as CNA was beginning to bathe her. Patient is awake, alert, oriented x3. Patient states "I'm fine." Neuro assessment completed. NIH 0. No neuro deficits. Dr. Contreras informed at this time and will come and assess patient. Daughter, Aleja at the bedside and informed.  @1200 Patient currently sitting up in bed talking on the phone.   "

## 2023-10-22 NOTE — PROGRESS NOTES
Gastroenterology progress note    Patient complains of some suprapubic discomfort and not urinating.  No signs of GI bleeding    Physical exam:  In general:  No acute distress    Abdomen: Soft, nontender    Lab:  11.4 and 34.2, BUN 22.9, creatinine 1.89    Impression:  GI bleed resolved.  Suprapubic pressure and increasing creatinine    Recommendation:  UA C&S, bladder scan.  Further recs as per primary.  GI will sign off.  Please call if needed further.

## 2023-10-22 NOTE — PROGRESS NOTES
[facility]  Hospital Medicine Progress Note      No visit      Chief Complaint: Inpatient Follow-up for GI bleed.     Subjective:  This is an 83-year-old female medical history of T2DM, HTN, HLD, CKD 3b, TIA on aspirin, osteoarthritis, chronic hepatitis-B on Vemlidy, hemorrhoids.     Present to the ED with complaint of melena/black stool.  Report she had an episode of black stool last week that resolved on its own.  Today had an episode where she felt dizzy/lightheaded with diffuse abdominal pain and large loose black bowel movements.  Subsequently she felt back to her baseline and abdominal pain subsided.  Report she has not been taking her aspirin for the past 3 weeks/ran out.  Denies oral NSAID use.  Denies prior EGD or colonoscopy.  Report prior history of intermittent hemorrhoidal bleed.     On arrival to ED she was afebrile and hemodynamically stable.  Labs notable for hemoglobin 12.3 > 11.6.  Rectal exam by ED physician show melanotic guaiac-positive stool.     She was given Protonix 80 mg IV, GI consulted and referred to hospital medicine service for further evaluation and management.    Patient is in colonoscopy today. No acute issues, chart was reviewed.       VITAL SIGNS: 24 HRS MIN & MAX LAST   Temp  Min: 97.6 °F (36.4 °C)  Max: 98.9 °F (37.2 °C) 98.3 °F (36.8 °C)   BP  Min: 96/54  Max: 132/80 119/61   Pulse  Min: 67  Max: 80  80   Resp  Min: 17  Max: 20 20   SpO2  Min: 96 %  Max: 99 % 96 %       Labs, Microbiology and Imaging were Reviewed.      Microbiology Results (last 7 days)       ** No results found for the last 168 hours. **               Medications   amLODIPine  5 mg Oral Daily    metoprolol tartrate  25 mg Oral BID    pantoprazole  40 mg Intravenous BID    pravastatin  40 mg Oral Daily    tenofovir alafenamide  25 mg Oral Daily        acetaminophen, acetaminophen, albuterol, aluminum-magnesium hydroxide-simethicone, cloNIDine, dextrose 10%, dextrose 10%, glucagon  (human recombinant), hydrALAZINE, insulin aspart U-100, labetalol, melatonin, ondansetron, polyethylene glycol, prochlorperazine, senna-docusate 8.6-50 mg, sodium chloride 0.9%     Radiology:  US Abdomen Limited  Narrative: EXAMINATION:  US ABDOMEN LIMITED    CLINICAL HISTORY:  Chronic viral hepatitis B without delta-agent    TECHNIQUE:  Limited ultrasound of the right upper quadrant of the abdomen was performed.    COMPARISON:  10/22/2021    FINDINGS:  LIMITATIONS: Exam limited due to poor acoustic window related to shadowing bowel gas and/or body habitus.    LIVER: 13.8 cm cranial caudal at the midclavicular line. Normal echotexture. No focal mass appreciable. Portal vein is patent with appropriate directional flow.    PANCREAS: Obscured by overlying bowel gas.    GALLBLADDER: No shadowing calculi, wall thickening, or pericholecystic fluid.    BILE DUCTS: 6 mm common bile duct.  Distal common bile duct is obscured by shadowing bowel gas.    INFERIOR VENA CAVA: Normal in appearance.    RIGHT KIDNEY: 9.5 cm. No hydronephrosis.    OTHER: No ascites.  Impression: No significant abnormality.    Electronically signed by: Wnedy Bragg  Date:    02/14/2023  Time:    12:53          Assessment/Plan:  Acute GI bleed/melena  CKD 3b -at baseline     History of chronic hepatitis-B on Vemlidy, HTN, HLD, T2DM, TIA on aspirin, osteoarthritis    Seen and examined the patient.  Afebrile vitals stable hemodynamically stable denied pain or discomfort.    EGD is unrevealing.  GI put an order for colonoscopy.    Patient has mild abdominal pain.  Continue Protonix for now.  Will continue follow.     All diagnosis and differential diagnosis have been reviewed; assessment and plan has been documented; I have personally reviewed the labs and test results that are presently available; I have reviewed the patients medication list; I have reviewed the consulting providers response and recommendations. I have reviewed or attempted to review  medical records based upon their availability.       Jerald Bowers MD   10/21/2023

## 2023-10-23 PROBLEM — G93.40 ENCEPHALOPATHY: Status: ACTIVE | Noted: 2023-10-23

## 2023-10-23 LAB
ALBUMIN SERPL-MCNC: 3.2 G/DL (ref 3.4–4.8)
ALBUMIN/GLOB SERPL: 1 RATIO (ref 1.1–2)
ALP SERPL-CCNC: 63 UNIT/L (ref 40–150)
ALT SERPL-CCNC: 10 UNIT/L (ref 0–55)
AST SERPL-CCNC: 18 UNIT/L (ref 5–34)
BASOPHILS # BLD AUTO: 0.03 X10(3)/MCL
BASOPHILS NFR BLD AUTO: 0.2 %
BILIRUB SERPL-MCNC: 0.9 MG/DL
BUN SERPL-MCNC: 23.2 MG/DL (ref 9.8–20.1)
CALCIUM SERPL-MCNC: 8 MG/DL (ref 8.4–10.2)
CHLORIDE SERPL-SCNC: 106 MMOL/L (ref 98–107)
CO2 SERPL-SCNC: 24 MMOL/L (ref 23–31)
CREAT SERPL-MCNC: 2.02 MG/DL (ref 0.55–1.02)
EOSINOPHIL # BLD AUTO: 0.09 X10(3)/MCL (ref 0–0.9)
EOSINOPHIL NFR BLD AUTO: 0.6 %
ERYTHROCYTE [DISTWIDTH] IN BLOOD BY AUTOMATED COUNT: 12.5 % (ref 11.5–17)
GFR SERPLBLD CREATININE-BSD FMLA CKD-EPI: 24 MLS/MIN/1.73/M2
GLOBULIN SER-MCNC: 3.3 GM/DL (ref 2.4–3.5)
GLUCOSE SERPL-MCNC: 121 MG/DL (ref 82–115)
HCT VFR BLD AUTO: 32.3 % (ref 37–47)
HGB BLD-MCNC: 10.7 G/DL (ref 12–16)
IMM GRANULOCYTES # BLD AUTO: 0.06 X10(3)/MCL (ref 0–0.04)
IMM GRANULOCYTES NFR BLD AUTO: 0.4 %
LYMPHOCYTES # BLD AUTO: 1.04 X10(3)/MCL (ref 0.6–4.6)
LYMPHOCYTES NFR BLD AUTO: 7.2 %
MCH RBC QN AUTO: 29.3 PG (ref 27–31)
MCHC RBC AUTO-ENTMCNC: 33.1 G/DL (ref 33–36)
MCV RBC AUTO: 88.5 FL (ref 80–94)
MONOCYTES # BLD AUTO: 0.96 X10(3)/MCL (ref 0.1–1.3)
MONOCYTES NFR BLD AUTO: 6.7 %
NEUTROPHILS # BLD AUTO: 12.23 X10(3)/MCL (ref 2.1–9.2)
NEUTROPHILS NFR BLD AUTO: 84.9 %
NRBC BLD AUTO-RTO: 0 %
PLATELET # BLD AUTO: 184 X10(3)/MCL (ref 130–400)
PMV BLD AUTO: 12.8 FL (ref 7.4–10.4)
POTASSIUM SERPL-SCNC: 3.8 MMOL/L (ref 3.5–5.1)
PROT SERPL-MCNC: 6.5 GM/DL (ref 5.8–7.6)
RBC # BLD AUTO: 3.65 X10(6)/MCL (ref 4.2–5.4)
SODIUM SERPL-SCNC: 139 MMOL/L (ref 136–145)
WBC # SPEC AUTO: 14.41 X10(3)/MCL (ref 4.5–11.5)

## 2023-10-23 PROCEDURE — 95816 EEG AWAKE AND DROWSY: CPT | Mod: 26,,, | Performed by: PSYCHIATRY & NEUROLOGY

## 2023-10-23 PROCEDURE — 11000001 HC ACUTE MED/SURG PRIVATE ROOM

## 2023-10-23 PROCEDURE — 51701 INSERT BLADDER CATHETER: CPT

## 2023-10-23 PROCEDURE — 25000003 PHARM REV CODE 250: Performed by: INTERNAL MEDICINE

## 2023-10-23 PROCEDURE — C9113 INJ PANTOPRAZOLE SODIUM, VIA: HCPCS | Performed by: PHYSICIAN ASSISTANT

## 2023-10-23 PROCEDURE — 63600175 PHARM REV CODE 636 W HCPCS: Performed by: PHYSICIAN ASSISTANT

## 2023-10-23 PROCEDURE — 95819 EEG AWAKE AND ASLEEP: CPT

## 2023-10-23 PROCEDURE — 25000003 PHARM REV CODE 250: Performed by: STUDENT IN AN ORGANIZED HEALTH CARE EDUCATION/TRAINING PROGRAM

## 2023-10-23 PROCEDURE — 80053 COMPREHEN METABOLIC PANEL: CPT | Performed by: STUDENT IN AN ORGANIZED HEALTH CARE EDUCATION/TRAINING PROGRAM

## 2023-10-23 PROCEDURE — 95816 PR EEG,W/AWAKE & DROWSY RECORD: ICD-10-PCS | Mod: 26,,, | Performed by: PSYCHIATRY & NEUROLOGY

## 2023-10-23 PROCEDURE — 85025 COMPLETE CBC W/AUTO DIFF WBC: CPT | Performed by: STUDENT IN AN ORGANIZED HEALTH CARE EDUCATION/TRAINING PROGRAM

## 2023-10-23 PROCEDURE — 51798 US URINE CAPACITY MEASURE: CPT

## 2023-10-23 RX ORDER — HYDROCORTISONE 1 %
CREAM (GRAM) TOPICAL 2 TIMES DAILY
Status: DISCONTINUED | OUTPATIENT
Start: 2023-10-23 | End: 2023-10-26 | Stop reason: HOSPADM

## 2023-10-23 RX ORDER — TAMSULOSIN HYDROCHLORIDE 0.4 MG/1
0.4 CAPSULE ORAL DAILY
Status: DISCONTINUED | OUTPATIENT
Start: 2023-10-23 | End: 2023-10-26 | Stop reason: HOSPADM

## 2023-10-23 RX ADMIN — PANTOPRAZOLE SODIUM 40 MG: 40 INJECTION, POWDER, FOR SOLUTION INTRAVENOUS at 10:10

## 2023-10-23 RX ADMIN — TENOFOVIR ALAFENAMIDE 25 MG: 25 TABLET ORAL at 09:10

## 2023-10-23 RX ADMIN — METOPROLOL TARTRATE 25 MG: 25 TABLET, FILM COATED ORAL at 10:10

## 2023-10-23 RX ADMIN — AMLODIPINE BESYLATE 5 MG: 5 TABLET ORAL at 10:10

## 2023-10-23 RX ADMIN — HYDROCORTISONE: 1 CREAM TOPICAL at 10:10

## 2023-10-23 RX ADMIN — ACETAMINOPHEN 1000 MG: 500 TABLET, FILM COATED ORAL at 10:10

## 2023-10-23 RX ADMIN — PRAVASTATIN SODIUM 40 MG: 40 TABLET ORAL at 10:10

## 2023-10-23 RX ADMIN — TAMSULOSIN HYDROCHLORIDE 0.4 MG: 0.4 CAPSULE ORAL at 12:10

## 2023-10-23 NOTE — PROGRESS NOTES
[facility]  Hospital Medicine Progress Note        Chief Complaint: Inpatient Follow-up for GI bleed.     Subjective:  This is an 83-year-old female medical history of T2DM, HTN, HLD, CKD 3b, TIA on aspirin, osteoarthritis, chronic hepatitis-B on Vemlidy, hemorrhoids.     Present to the ED with complaint of melena/black stool.  Report she had an episode of black stool last week that resolved on its own.  Today had an episode where she felt dizzy/lightheaded with diffuse abdominal pain and large loose black bowel movements.  Subsequently she felt back to her baseline and abdominal pain subsided.  Report she has not been taking her aspirin for the past 3 weeks/ran out.  Denies oral NSAID use.  Denies prior EGD or colonoscopy.  Report prior history of intermittent hemorrhoidal bleed.     On arrival to ED she was afebrile and hemodynamically stable.  Labs notable for hemoglobin 12.3 > 11.6.  Rectal exam by ED physician show melanotic guaiac-positive stool.     She was given Protonix 80 mg IV, GI consulted and referred to hospital medicine service for further evaluation and management.    Patient is in colonoscopy today.   Patient is complaining of urinary retention.  We will do straight cath and start Flomax.    Physical exam  General: Oriented x3, no acute distress  Cardiac: Regular rate and rhythm, no murmur or gallop  Respiratory clear bilaterally, on room air, nonlabored  Abdomen soft, Non-tender, Non-distended, no rigidity or rebound.  Extremities no edema.  Neurology is CN 2 through 12 intact, sensorimotor grossly intact.      VITAL SIGNS: 24 HRS MIN & MAX LAST   Temp  Min: 97.9 °F (36.6 °C)  Max: 99.8 °F (37.7 °C) 98.4 °F (36.9 °C)   BP  Min: 89/51  Max: 113/51 (!) 113/51   Pulse  Min: 74  Max: 86  75   Resp  Min: 18  Max: 18 18   SpO2  Min: 96 %  Max: 99 % 96 %       Labs, Microbiology and Imaging were Reviewed.      Microbiology Results (last 7 days)       Procedure Component Value  Units Date/Time    Urine culture [[id number]]  (Abnormal) Collected: 10/22/23 1620    Order Status: Completed Specimen: Urine Updated: 10/23/23 0702     Urine Culture 25,000-50,000 colonies/ml GAMMA STREPTOCOCCUS      25,000-50,000 colonies/ml Gram-negative Rods               Medications   amLODIPine  5 mg Oral Daily    hydrocortisone   Topical (Top) BID    metoprolol tartrate  25 mg Oral BID    pantoprazole  40 mg Intravenous BID    pravastatin  40 mg Oral Daily    tamsulosin  0.4 mg Oral Daily    tenofovir alafenamide  25 mg Oral Daily        acetaminophen, acetaminophen, albuterol, aluminum-magnesium hydroxide-simethicone, cloNIDine, dextrose 10%, dextrose 10%, glucagon (human recombinant), hydrALAZINE, insulin aspart U-100, labetalol, melatonin, ondansetron, polyethylene glycol, prochlorperazine, senna-docusate 8.6-50 mg, sodium chloride 0.9%     Radiology:  MRI Brain Without Contrast  Narrative: EXAMINATION:  MRI BRAIN WITHOUT CONTRAST    CLINICAL HISTORY:  Transient ischemic attack (TIA);rule out TIA;    TECHNIQUE:  Multiplanar, multisequence MR images of the brain were obtained without the administration of intravenous contrast.    COMPARISON:  MRI brain dated 07/19/2022    FINDINGS:  There is no restricted diffusion, hemorrhage or edema.  Moderate patchy T2/FLAIR hyperintensities in the subcortical and periventricular white matter likely represent chronic microvascular ischemic changes.    There is no mass effect or midline shift.  The basal cisterns are patent.  There is moderate diffuse parenchymal volume loss.  There is no hydrocephalus or abnormal extra-axial fluid collection.  The major intracranial flow voids are patent.  There is a left mastoid effusion.  Impression: 1. No acute intracranial abnormality.  2. Moderate chronic microvascular ischemic changes.    Electronically signed by: Aleja Benito  Date:    10/23/2023  Time:    10:04          Assessment/Plan:  Acute GI bleed/melena  CKD 3b -at  baseline     History of chronic hepatitis-B on Vemlidy, HTN, HLD, T2DM, TIA on aspirin, osteoarthritis      GI signed off.  Hemoglobin remained stable.    Complained of brief period of unresponsiveness and urinary incontinence, had EEG and MRI both were negative for seizure and stroke.  - carotid duplex in 7/22 was negative, echocardiogram in 7/22 was also negative and EF was normal with normal left atrium and right atrium  - monitor patient worked with PT OT today if doing well will discharge tomorrow morning.  - started on ceftriaxone for UTI however urine cultures are insignificant we will discontinue ceftriaxone.  EGD is unrevealing.  GI put an order for colonoscopy.    Patient has mild abdominal pain.  Continue Protonix for now.    All diagnosis and differential diagnosis have been reviewed; assessment and plan has been documented; I have personally reviewed the labs and test results that are presently available; I have reviewed the patients medication list; I have reviewed the consulting providers response and recommendations. I have reviewed or attempted to review medical records based upon their availability.       Jerald Bowers MD   10/23/2023

## 2023-10-23 NOTE — PROCEDURES
EEG    Dx: Seizure    Duration: 26 minutes    Technical: Standard digital EEG was performed at [facility]. The 10/20 international system of electrode placement was used.  Photic   stimulation was performed.    Description: The posterior dominant rhythm was 7 Hz.  There   were no lateralizing features.  The patient drowsed.  There were   no epileptiform discharges or clinical seizures seen.    Impression: Mild background slowing; possibly normal for age. No evidence of seizure activity.

## 2023-10-24 LAB
ALBUMIN SERPL-MCNC: 2.9 G/DL (ref 3.4–4.8)
ALBUMIN/GLOB SERPL: 1 RATIO (ref 1.1–2)
ALP SERPL-CCNC: 56 UNIT/L (ref 40–150)
ALT SERPL-CCNC: 10 UNIT/L (ref 0–55)
AST SERPL-CCNC: 16 UNIT/L (ref 5–34)
BACTERIA UR CULT: ABNORMAL
BASOPHILS # BLD AUTO: 0.03 X10(3)/MCL
BASOPHILS NFR BLD AUTO: 0.3 %
BILIRUB SERPL-MCNC: 0.7 MG/DL
BUN SERPL-MCNC: 25.9 MG/DL (ref 9.8–20.1)
CALCIUM SERPL-MCNC: 8.1 MG/DL (ref 8.4–10.2)
CHLORIDE SERPL-SCNC: 105 MMOL/L (ref 98–107)
CO2 SERPL-SCNC: 23 MMOL/L (ref 23–31)
CREAT SERPL-MCNC: 2.1 MG/DL (ref 0.55–1.02)
EOSINOPHIL # BLD AUTO: 0.07 X10(3)/MCL (ref 0–0.9)
EOSINOPHIL NFR BLD AUTO: 0.6 %
ERYTHROCYTE [DISTWIDTH] IN BLOOD BY AUTOMATED COUNT: 12.4 % (ref 11.5–17)
GFR SERPLBLD CREATININE-BSD FMLA CKD-EPI: 23 MLS/MIN/1.73/M2
GLOBULIN SER-MCNC: 3 GM/DL (ref 2.4–3.5)
GLUCOSE SERPL-MCNC: 226 MG/DL (ref 82–115)
HCT VFR BLD AUTO: 29.3 % (ref 37–47)
HGB BLD-MCNC: 9.7 G/DL (ref 12–16)
IMM GRANULOCYTES # BLD AUTO: 0.04 X10(3)/MCL (ref 0–0.04)
IMM GRANULOCYTES NFR BLD AUTO: 0.4 %
LYMPHOCYTES # BLD AUTO: 0.84 X10(3)/MCL (ref 0.6–4.6)
LYMPHOCYTES NFR BLD AUTO: 7.7 %
MCH RBC QN AUTO: 29.6 PG (ref 27–31)
MCHC RBC AUTO-ENTMCNC: 33.1 G/DL (ref 33–36)
MCV RBC AUTO: 89.3 FL (ref 80–94)
MONOCYTES # BLD AUTO: 0.97 X10(3)/MCL (ref 0.1–1.3)
MONOCYTES NFR BLD AUTO: 8.9 %
NEUTROPHILS # BLD AUTO: 8.94 X10(3)/MCL (ref 2.1–9.2)
NEUTROPHILS NFR BLD AUTO: 82.1 %
NRBC BLD AUTO-RTO: 0 %
PLATELET # BLD AUTO: 157 X10(3)/MCL (ref 130–400)
PLATELETS.RETICULATED NFR BLD AUTO: 10.3 % (ref 0.9–11.2)
PMV BLD AUTO: 12.9 FL (ref 7.4–10.4)
POCT GLUCOSE: 180 MG/DL (ref 70–110)
POTASSIUM SERPL-SCNC: 4 MMOL/L (ref 3.5–5.1)
PROT SERPL-MCNC: 5.9 GM/DL (ref 5.8–7.6)
PSYCHE PATHOLOGY RESULT: NORMAL
RBC # BLD AUTO: 3.28 X10(6)/MCL (ref 4.2–5.4)
SODIUM SERPL-SCNC: 137 MMOL/L (ref 136–145)
WBC # SPEC AUTO: 10.89 X10(3)/MCL (ref 4.5–11.5)

## 2023-10-24 PROCEDURE — 80053 COMPREHEN METABOLIC PANEL: CPT | Performed by: STUDENT IN AN ORGANIZED HEALTH CARE EDUCATION/TRAINING PROGRAM

## 2023-10-24 PROCEDURE — 63600175 PHARM REV CODE 636 W HCPCS: Performed by: PHYSICIAN ASSISTANT

## 2023-10-24 PROCEDURE — C9113 INJ PANTOPRAZOLE SODIUM, VIA: HCPCS | Performed by: PHYSICIAN ASSISTANT

## 2023-10-24 PROCEDURE — 11000001 HC ACUTE MED/SURG PRIVATE ROOM

## 2023-10-24 PROCEDURE — 25000003 PHARM REV CODE 250: Performed by: INTERNAL MEDICINE

## 2023-10-24 PROCEDURE — 25000003 PHARM REV CODE 250: Performed by: STUDENT IN AN ORGANIZED HEALTH CARE EDUCATION/TRAINING PROGRAM

## 2023-10-24 PROCEDURE — 63600175 PHARM REV CODE 636 W HCPCS: Performed by: STUDENT IN AN ORGANIZED HEALTH CARE EDUCATION/TRAINING PROGRAM

## 2023-10-24 PROCEDURE — 85025 COMPLETE CBC W/AUTO DIFF WBC: CPT | Performed by: STUDENT IN AN ORGANIZED HEALTH CARE EDUCATION/TRAINING PROGRAM

## 2023-10-24 RX ORDER — SODIUM CHLORIDE, SODIUM LACTATE, POTASSIUM CHLORIDE, CALCIUM CHLORIDE 600; 310; 30; 20 MG/100ML; MG/100ML; MG/100ML; MG/100ML
INJECTION, SOLUTION INTRAVENOUS CONTINUOUS
Status: DISCONTINUED | OUTPATIENT
Start: 2023-10-24 | End: 2023-10-26 | Stop reason: HOSPADM

## 2023-10-24 RX ADMIN — TAMSULOSIN HYDROCHLORIDE 0.4 MG: 0.4 CAPSULE ORAL at 10:10

## 2023-10-24 RX ADMIN — METOPROLOL TARTRATE 25 MG: 25 TABLET, FILM COATED ORAL at 10:10

## 2023-10-24 RX ADMIN — HYDROCORTISONE: 1 CREAM TOPICAL at 10:10

## 2023-10-24 RX ADMIN — HYDROCORTISONE: 1 CREAM TOPICAL at 09:10

## 2023-10-24 RX ADMIN — METOPROLOL TARTRATE 25 MG: 25 TABLET, FILM COATED ORAL at 09:10

## 2023-10-24 RX ADMIN — ACETAMINOPHEN 1000 MG: 500 TABLET, FILM COATED ORAL at 09:10

## 2023-10-24 RX ADMIN — PRAVASTATIN SODIUM 40 MG: 40 TABLET ORAL at 10:10

## 2023-10-24 RX ADMIN — SODIUM CHLORIDE, POTASSIUM CHLORIDE, SODIUM LACTATE AND CALCIUM CHLORIDE: 600; 310; 30; 20 INJECTION, SOLUTION INTRAVENOUS at 11:10

## 2023-10-24 RX ADMIN — SODIUM CHLORIDE, POTASSIUM CHLORIDE, SODIUM LACTATE AND CALCIUM CHLORIDE: 600; 310; 30; 20 INJECTION, SOLUTION INTRAVENOUS at 02:10

## 2023-10-24 RX ADMIN — TENOFOVIR ALAFENAMIDE 25 MG: 25 TABLET ORAL at 09:10

## 2023-10-24 RX ADMIN — PANTOPRAZOLE SODIUM 40 MG: 40 INJECTION, POWDER, FOR SOLUTION INTRAVENOUS at 09:10

## 2023-10-24 NOTE — PROGRESS NOTES
[facility]  Hospital Medicine Progress Note          Chief Complaint: Inpatient Follow-up for GI bleed.     Subjective:  This is an 83-year-old female medical history of T2DM, HTN, HLD, CKD 3b, TIA on aspirin, osteoarthritis, chronic hepatitis-B on Vemlidy, hemorrhoids.     Present to the ED with complaint of melena/black stool.  Report she had an episode of black stool last week that resolved on its own.  Today had an episode where she felt dizzy/lightheaded with diffuse abdominal pain and large loose black bowel movements.  Subsequently she felt back to her baseline and abdominal pain subsided.  Report she has not been taking her aspirin for the past 3 weeks/ran out.  Denies oral NSAID use.  Denies prior EGD or colonoscopy.  Report prior history of intermittent hemorrhoidal bleed.     On arrival to ED she was afebrile and hemodynamically stable.  Labs notable for hemoglobin 12.3 > 11.6.  Rectal exam by ED physician show melanotic guaiac-positive stool.     She was given Protonix 80 mg IV, GI consulted and referred to hospital medicine service for further evaluation and management.    Elevated BUN and creatinine today.  Patient is complaining of external hemorrhoids stage III.      General: Oriented x3, no acute distress  Cardiac: Regular rate and rhythm, no murmur or gallop  Respiratory clear bilaterally, on room air, nonlabored  Abdomen soft, Non-tender, Non-distended, no rigidity or rebound.  Extremities no edema.  Neurology is CN 2 through 12 intact, sensorimotor grossly intact.        VITAL SIGNS: 24 HRS MIN & MAX LAST   Temp  Min: 98.2 °F (36.8 °C)  Max: 99.7 °F (37.6 °C) 98.6 °F (37 °C)   BP  Min: 110/54  Max: 123/60 123/60   Pulse  Min: 66  Max: 80  80   Resp  Min: 18  Max: 19 19   SpO2  Min: 95 %  Max: 97 % 97 %       Labs, Microbiology and Imaging were Reviewed.      Microbiology Results (last 7 days)       Procedure Component Value Units Date/Time    Urine culture  [[id number]]  (Abnormal) Collected: 10/22/23 1620    Order Status: Completed Specimen: Urine Updated: 10/24/23 0934     Urine Culture Multiple organisms present indicate probable contamination. Recommend recollection.      25,000-50,000 colonies/ml GAMMA STREPTOCOCCUS      25,000-50,000 colonies/ml Gram-negative Rods      10,000 - 25,000 colonies/ml Presumptive proteus species               Medications   amLODIPine  5 mg Oral Daily    hydrocortisone   Topical (Top) BID    metoprolol tartrate  25 mg Oral BID    pantoprazole  40 mg Intravenous BID    pravastatin  40 mg Oral Daily    tamsulosin  0.4 mg Oral Daily    tenofovir alafenamide  25 mg Oral Daily        acetaminophen, acetaminophen, albuterol, aluminum-magnesium hydroxide-simethicone, cloNIDine, dextrose 10%, dextrose 10%, glucagon (human recombinant), hydrALAZINE, insulin aspart U-100, labetalol, melatonin, ondansetron, polyethylene glycol, prochlorperazine, senna-docusate 8.6-50 mg, sodium chloride 0.9%     Radiology:  MRI Brain Without Contrast  Narrative: EXAMINATION:  MRI BRAIN WITHOUT CONTRAST    CLINICAL HISTORY:  Transient ischemic attack (TIA);rule out TIA;    TECHNIQUE:  Multiplanar, multisequence MR images of the brain were obtained without the administration of intravenous contrast.    COMPARISON:  MRI brain dated 07/19/2022    FINDINGS:  There is no restricted diffusion, hemorrhage or edema.  Moderate patchy T2/FLAIR hyperintensities in the subcortical and periventricular white matter likely represent chronic microvascular ischemic changes.    There is no mass effect or midline shift.  The basal cisterns are patent.  There is moderate diffuse parenchymal volume loss.  There is no hydrocephalus or abnormal extra-axial fluid collection.  The major intracranial flow voids are patent.  There is a left mastoid effusion.  Impression: 1. No acute intracranial abnormality.  2. Moderate chronic microvascular ischemic changes.    Electronically signed  by: Aleja Rodriguezy  Date:    10/23/2023  Time:    10:04          Assessment/Plan:  Acute GI bleed/melena  CKD 3b -at baseline     History of chronic hepatitis-B on Vemlidy, HTN, HLD, T2DM, TIA on aspirin, osteoarthritis      - consult General surgery evaluation for external hemorrhoids   She is 83 years old comorbidities, surgery recommended outpatient evaluation.    We will start 125 cc/hour LR for worsening creatinine and possibility of VAHID.  -WBCs improving.  No hepatics for now.  EEG and MRI both were negative for seizure and stroke.  - carotid duplex in 7/22 was negative, echocardiogram in 7/22 was also negative and EF was normal with normal left atrium and right atrium  - monitor patient worked with PT OT today if doing well will discharge tomorrow morning.  - started on ceftriaxone for UTI however urine cultures are insignificant we will discontinue ceftriaxone.  EGD is unrevealing.  GI put an order for colonoscopy.    Patient has mild abdominal pain.    If improving creatinine will discharge patient tomorrow.    All diagnosis and differential diagnosis have been reviewed; assessment and plan has been documented; I have personally reviewed the labs and test results that are presently available; I have reviewed the patients medication list; I have reviewed the consulting providers response and recommendations. I have reviewed or attempted to review medical records based upon their availability.       Jerald Bowers MD   10/24/2023

## 2023-10-24 NOTE — NURSING
Altered Mental Status...the patient has been confused intermittently; disoriented place, time, and situation. Pt also c/o bladder pain and experienced retention yesterday resulting in a straight cath performed. Worried about these symptoms r/t UTI and only had one dose of antibiotics.    Pt's hemorrhoids are significant...3 large protruding out of her rectum. One of the areas has purple discoloration. Need to watch for worsening discoloration.    Thank you

## 2023-10-24 NOTE — ANESTHESIA POSTPROCEDURE EVALUATION
Anesthesia Post Evaluation    Patient: Tonie Tejada    Procedure(s) Performed: Procedure(s) (LRB):  COLONOSCOPY (N/A)  COLONOSCOPY, WITH POLYPECTOMY USING SNARE    Final Anesthesia Type: general      Patient location during evaluation: PACU  Patient participation: Yes- Able to Participate  Level of consciousness: awake and alert  Post-procedure vital signs: reviewed and stable  Pain management: adequate  Airway patency: patent    PONV status at discharge: No PONV, nausea (controlled) and vomiting (controlled)  Anesthetic complications: no      Cardiovascular status: blood pressure returned to baseline and stable  Respiratory status: unassisted  Hydration status: euvolemic  Follow-up not needed.          Vitals Value Taken Time   /66 10/21/23 1245   Temp ** 10/24/23 1611   Pulse 67 10/21/23 1245   Resp 19 10/21/23 1245   SpO2 97 % 10/21/23 1230         No case tracking events are documented in the log.      Pain/Haresh Score: Pain Rating Prior to Med Admin: 3 (10/23/2023 10:08 PM)  Pain Rating Post Med Admin: 1 (10/23/2023 12:58 AM)

## 2023-10-24 NOTE — DISCHARGE INSTRUCTIONS
Hemorrhoid Management:   -6-8 cups of water a day   -Daily Metamucil/Fiber supplement (30 grams of fiber/ day)   -Bowel regimen w/ Miralax/ Senna-docusate (Stop taking if having frequent diarrhea)   -Avoid prolong sitting on toilet, limit to 5 minutes   -Sitz baths daily

## 2023-10-24 NOTE — PLAN OF CARE
10/24/23 1215   Medicare Message   Important Message from Medicare regarding Discharge Appeal Rights Given to patient/caregiver;Explained to patient/caregiver

## 2023-10-24 NOTE — CONSULTS
[facility]/General Surgery  CONSULT NOTE  Chief Complaint:   Dark stool, lightheadedness     History of Present Illness:  Tonie Tejada is a 83 y.o. female with PMHx of T2DM, HTN, HLD, CKD 3b, TIA on aspirin, osteoarthritis, chronic hepatitis-B on Vemlidy, and hemorrhoids admitted for melena w/ dizzy/lightheaded. Colonoscopy performed which showed diverticula, one 2mm polyp, and hypertrophied anal papillae. General surgery c/f evaluation of hemorrhoids     Review of Systems:  Review of Systems   Constitutional:  Positive for malaise/fatigue. Negative for chills, fever and weight loss.   Cardiovascular:  Negative for chest pain.   Gastrointestinal:  Positive for constipation and melena. Negative for abdominal pain, diarrhea, nausea and vomiting.   Neurological:  Positive for dizziness.         Allergies:  Review of patient's allergies indicates:  No Known Allergies    Home Medications:  No current facility-administered medications on file prior to encounter.     Current Outpatient Medications on File Prior to Encounter   Medication Sig    amLODIPine (NORVASC) 5 MG tablet amlodipine 5 mg tablet   TAKE 1 TABLET BY MOUTH ONCE DAILY    aspirin (ECOTRIN) 81 MG EC tablet Adult Low Dose Aspirin 81 mg tablet,delayed release   Take 1 tablet every day by oral route.    metoprolol tartrate (LOPRESSOR) 25 MG tablet Take 25 mg by mouth 2 (two) times daily.    omeprazole (PRILOSEC) 40 MG capsule 1 capsule 30 minutes before morning meal    pioglitazone (ACTOS) 30 MG tablet Take 30 mg by mouth.    simvastatin (ZOCOR) 20 MG tablet simvastatin 20 mg tablet   TAKE 1 TABLET BY MOUTH ONCE DAILY AT BEDTIME    tenofovir alafenamide (VEMLIDY) 25 mg Tab Vemlidy 25 mg tablet  TAKE 1 TABLET BY MOUTH ONCE DAILY    albuterol (PROVENTIL/VENTOLIN HFA) 90 mcg/actuation inhaler albuterol sulfate HFA 90 mcg/actuation aerosol inhaler   Inhale 1 puff as needed by inhalation route for 17 days.    diclofenac sodium (VOLTAREN) 1 % Gel Apply 2 g  "topically once daily.       Past Medical History:  Past Medical History:   Diagnosis Date    Chronic hepatitis B     DM (diabetes mellitus)     HLD (hyperlipidemia)     HTN (hypertension)     OA (osteoarthritis)     TIA (transient ischemic attack)        Past Surgical History:  Past Surgical History:   Procedure Laterality Date     SECTION      COLONOSCOPY N/A 10/21/2023    Procedure: COLONOSCOPY;  Surgeon: Jasper Davis MD;  [facility];  Service: Gastroenterology;  Laterality: N/A;    COLONOSCOPY, WITH POLYPECTOMY USING SNARE  10/21/2023    Procedure: COLONOSCOPY, WITH POLYPECTOMY USING SNARE;  Surgeon: Jasper Davis MD;  Location: [facility] OR;  Service: Gastroenterology;;    ESOPHAGOGASTRODUODENOSCOPY N/A 10/20/2023    Procedure: EGD;  Surgeon: Cody Urbina MD;  Location: [facility] ENDOSCOPY;  Service: Gastroenterology;  Laterality: N/A;    HYSTERECTOMY      JOINT REPLACEMENT         Family History:   History reviewed. No pertinent family history.    Social History:   Social History     Tobacco Use    Smoking status: Former    Smokeless tobacco: Never    Tobacco comments:     states a pack would last a week, quit 50 yrs ago   Substance Use Topics    Alcohol use: Not Currently    Drug use: Never        Vital Signs:  Temp: 98.6 °F (37 °C) (10/24/23 1151)  Pulse: 80 (10/24/23 1151)  Resp: 19 (10/24/23 0508)  BP: 123/60 (10/24/23 1151)  SpO2: 97 % (10/24/23 0508)    Physical Exam:   Gen: NAD, AAOx3, answering questions appropriately  HEENT: Atraumatic   CV: RR  Resp: NWOB  Abd: S/NT/ND  DARCI: large hypertrophied rectal papillae, palpable internal hemorrhoids, no blood, non tender to palpation       Labs:  Renal:  Recent Labs     10/22/23  0545 10/23/23  1006 10/24/23  0944   BUN 22.9* 23.2* 25.9*   CREATININE 1.89* 2.02* 2.10*     No results for input(s): "LACTIC" in the last 72 hours.  FENGI:  Recent Labs     10/22/23  0545 10/23/23  1006 10/24/23  0944    139 137   K 3.8 3.8 4.0 "   CO2 21* 24 23   CALCIUM 7.8* 8.0* 8.1*   ALBUMIN 3.4 3.2* 2.9*   BILITOT 0.9 0.9 0.7   AST 19 18 16   ALKPHOS 67 63 56   ALT 11 10 10     Heme:  Recent Labs     10/22/23  0545 10/23/23  1006 10/24/23  0944   HGB 11.4* 10.7* 9.7*   HCT 34.2* 32.3* 29.3*    184 157     ID:  Recent Labs     10/22/23  0545 10/23/23  1006 10/24/23  0944   WBC 8.20 14.41* 10.89     CBG:  Recent Labs     10/22/23  0545 10/23/23  1006 10/24/23  0944   GLUCOSE 117* 121* 226*      Cardiovascular:  Recent Labs   Lab 10/19/23  1428   TROPONINI 0.023     I have reviewed all pertinent lab results within the past 24 hours.    Imaging:  MRI Brain Without Contrast   Final Result      1. No acute intracranial abnormality.   2. Moderate chronic microvascular ischemic changes.         Electronically signed by: Aleja Benito   Date:    10/23/2023   Time:    10:04         I have reviewed all pertinent imaging results/findings within the past 24 hours.    Micro/Path/Other:  Microbiology Results (last 7 days)       Procedure Component Value Units Date/Time    Urine culture [[id number]]  (Abnormal) Collected: 10/22/23 1620    Order Status: Completed Specimen: Urine Updated: 10/24/23 0928     Urine Culture Multiple organisms present indicate probable contamination. Recommend recollection.      25,000-50,000 colonies/ml GAMMA STREPTOCOCCUS      25,000-50,000 colonies/ml Gram-negative Rods      10,000 - 25,000 colonies/ml Presumptive proteus species           Specimen (168h ago, onward)       Start     Ordered    10/21/23 1211  Specimen to Pathology  RELEASE UPON ORDERING        References:    Click here for ordering Quick Tip   Question:  Release to patient  Answer:  Immediate    10/21/23 1211                         ASSESSMENT:     Tonie Tejada is a 83 y.o. female here for evaluation of melena. General surgery c/f evaluation of hemorrhoids. On exam three large hypertrophied rectal papillae were appreciated on external exam. Pt states she has  episodes of dark colored stools but no bright red blood per rectum. Exam, clinical presentation, and appearance inconsistent with thrombosed or prolapsed hemorrhoids thus no indication for acute surgical intervention. Extensive conversation had with patient about conservative/ non operative management     PLAN:     Hemorrhoid Management:   -6-8 cups of water a day   -Daily Metamucil/Fiber supplement (30 grams of fiber/ day)   -Bowel regimen w/ Miralax/ Senna-docusate   -Avoid prolong sitting on toilet, limit to 5 minutes   -Sitz baths daily     -No indication for acute surgical intervention in the inpatient setting   -Follow up with colorectal surgery Outpatient       Jefferson Rodriguez MD  [facility] General Surgery PGY-1  1:26 PM

## 2023-10-25 LAB
ANION GAP SERPL CALC-SCNC: 8 MEQ/L
BUN SERPL-MCNC: 22.7 MG/DL (ref 9.8–20.1)
CALCIUM SERPL-MCNC: 7.8 MG/DL (ref 8.4–10.2)
CHLORIDE SERPL-SCNC: 107 MMOL/L (ref 98–107)
CO2 SERPL-SCNC: 21 MMOL/L (ref 23–31)
CREAT SERPL-MCNC: 1.47 MG/DL (ref 0.55–1.02)
CREAT/UREA NIT SERPL: 15
GFR SERPLBLD CREATININE-BSD FMLA CKD-EPI: 35 MLS/MIN/1.73/M2
GLUCOSE SERPL-MCNC: 131 MG/DL (ref 82–115)
POCT GLUCOSE: 174 MG/DL (ref 70–110)
POCT GLUCOSE: 233 MG/DL (ref 70–110)
POTASSIUM SERPL-SCNC: 3.7 MMOL/L (ref 3.5–5.1)
SODIUM SERPL-SCNC: 136 MMOL/L (ref 136–145)

## 2023-10-25 PROCEDURE — 80048 BASIC METABOLIC PNL TOTAL CA: CPT | Performed by: STUDENT IN AN ORGANIZED HEALTH CARE EDUCATION/TRAINING PROGRAM

## 2023-10-25 PROCEDURE — 11000001 HC ACUTE MED/SURG PRIVATE ROOM

## 2023-10-25 PROCEDURE — C9113 INJ PANTOPRAZOLE SODIUM, VIA: HCPCS | Performed by: PHYSICIAN ASSISTANT

## 2023-10-25 PROCEDURE — 63600175 PHARM REV CODE 636 W HCPCS: Performed by: STUDENT IN AN ORGANIZED HEALTH CARE EDUCATION/TRAINING PROGRAM

## 2023-10-25 PROCEDURE — 97162 PT EVAL MOD COMPLEX 30 MIN: CPT

## 2023-10-25 PROCEDURE — 51702 INSERT TEMP BLADDER CATH: CPT

## 2023-10-25 PROCEDURE — 63600175 PHARM REV CODE 636 W HCPCS: Performed by: PHYSICIAN ASSISTANT

## 2023-10-25 PROCEDURE — 25000003 PHARM REV CODE 250: Performed by: INTERNAL MEDICINE

## 2023-10-25 PROCEDURE — 51798 US URINE CAPACITY MEASURE: CPT

## 2023-10-25 PROCEDURE — 25000003 PHARM REV CODE 250: Performed by: STUDENT IN AN ORGANIZED HEALTH CARE EDUCATION/TRAINING PROGRAM

## 2023-10-25 PROCEDURE — 63600175 PHARM REV CODE 636 W HCPCS: Performed by: INTERNAL MEDICINE

## 2023-10-25 RX ORDER — POLYETHYLENE GLYCOL 3350 17 G/17G
17 POWDER, FOR SOLUTION ORAL DAILY PRN
Status: DISCONTINUED | OUTPATIENT
Start: 2023-10-25 | End: 2023-10-25 | Stop reason: SDUPTHER

## 2023-10-25 RX ORDER — HYOSCYAMINE SULFATE 0.12 MG/1
0.12 TABLET SUBLINGUAL EVERY 4 HOURS PRN
Status: DISCONTINUED | OUTPATIENT
Start: 2023-10-25 | End: 2023-10-26 | Stop reason: HOSPADM

## 2023-10-25 RX ORDER — POLYETHYLENE GLYCOL 3350 17 G/17G
17 POWDER, FOR SOLUTION ORAL DAILY
Status: DISCONTINUED | OUTPATIENT
Start: 2023-10-26 | End: 2023-10-26 | Stop reason: HOSPADM

## 2023-10-25 RX ORDER — AMOXICILLIN 250 MG
1 CAPSULE ORAL DAILY PRN
Status: DISCONTINUED | OUTPATIENT
Start: 2023-10-25 | End: 2023-10-25 | Stop reason: SDUPTHER

## 2023-10-25 RX ORDER — AMOXICILLIN 250 MG
1 CAPSULE ORAL DAILY
Status: DISCONTINUED | OUTPATIENT
Start: 2023-10-26 | End: 2023-10-26 | Stop reason: HOSPADM

## 2023-10-25 RX ADMIN — METOPROLOL TARTRATE 25 MG: 25 TABLET, FILM COATED ORAL at 09:10

## 2023-10-25 RX ADMIN — TENOFOVIR ALAFENAMIDE 25 MG: 25 TABLET ORAL at 09:10

## 2023-10-25 RX ADMIN — CEFTRIAXONE SODIUM 1 G: 1 INJECTION, POWDER, FOR SOLUTION INTRAMUSCULAR; INTRAVENOUS at 12:10

## 2023-10-25 RX ADMIN — TAMSULOSIN HYDROCHLORIDE 0.4 MG: 0.4 CAPSULE ORAL at 10:10

## 2023-10-25 RX ADMIN — METOPROLOL TARTRATE 25 MG: 25 TABLET, FILM COATED ORAL at 10:10

## 2023-10-25 RX ADMIN — HYDROCORTISONE: 1 CREAM TOPICAL at 10:10

## 2023-10-25 RX ADMIN — PRAVASTATIN SODIUM 40 MG: 40 TABLET ORAL at 10:10

## 2023-10-25 RX ADMIN — PANTOPRAZOLE SODIUM 40 MG: 40 INJECTION, POWDER, FOR SOLUTION INTRAVENOUS at 10:10

## 2023-10-25 RX ADMIN — AMLODIPINE BESYLATE 5 MG: 5 TABLET ORAL at 10:10

## 2023-10-25 RX ADMIN — PANTOPRAZOLE SODIUM 40 MG: 40 INJECTION, POWDER, FOR SOLUTION INTRAVENOUS at 09:10

## 2023-10-25 RX ADMIN — ACETAMINOPHEN 1000 MG: 500 TABLET, FILM COATED ORAL at 09:10

## 2023-10-25 RX ADMIN — HYDROCORTISONE: 1 CREAM TOPICAL at 09:10

## 2023-10-25 RX ADMIN — INSULIN ASPART 4 UNITS: 100 INJECTION, SOLUTION INTRAVENOUS; SUBCUTANEOUS at 12:10

## 2023-10-25 NOTE — PT/OT/SLP EVAL
Physical Therapy Evaluation    Patient Name:  Tonie Tejada   MRN:  [id number]    Recommendations:     Discharge therapy intensity: moderate intensity   Discharge Equipment Recommendations: none   Barriers to discharge: Impaired mobility and Ongoing medical needs    Assessment:     Tonie Tejada is a 83 y.o. female admitted with a medical diagnosis of Gastrointestinal hemorrhage. Prior to admit, patient was independent and ambulated with rollator. She presents with the following impairments/functional limitations: weakness, impaired endurance, impaired self care skills, impaired functional mobility, impaired balance, gait instability, decreased safety awareness, pain. She required MOD A for bed mobility. MIN A for sit<>stand. Ambulated 84 ft with RW & CGA-MIN A. Right knee buckled several times. Patient will benefit from continued PT services while in hospital to improve functional mobility & return to PLOF. Would benefit from low intensity exercise at discharge. Progress as tolerated.     Rehab Prognosis: Good; patient would benefit from acute skilled PT services to address these deficits and reach maximum level of function.    Recent Surgery: Procedure(s) (LRB):  COLONOSCOPY (N/A)  COLONOSCOPY, WITH POLYPECTOMY USING SNARE 4 Days Post-Op    Plan:     During this hospitalization, patient to be seen 5 x/week to address the identified rehab impairments via gait training, therapeutic activities, therapeutic exercises, neuromuscular re-education and progress toward the following goals:    Plan of Care Expires:  11/25/23    Subjective     Chief Complaint: pain  Patient/Family Comments/goals: none  Pain/Comfort:  Pain Rating 1: 7/10  Location 1:  (rectum)  Pain Addressed 1: Distraction, Reposition  Pain Rating Post-Intervention 1: 7/10    Patients cultural, spiritual, Jainism conflicts given the current situation: no    Living Environment:  Lives with spouse & son in [facility] with 5 steps (bilateral rails) to enter.    Prior to admission, patients level of function was independent.  Equipment used at home: bedside commode, shower chair, rollator.  DME owned (not currently used): none.  Upon discharge, patient will have assistance from family.    Objective:     Communicated with nurse prior to session.  Patient found supine with telemetry, peripheral IV, PureWick  upon PT entry to room.    General Precautions: Standard, fall  Orthopedic Precautions:N/A   Braces: N/A  Respiratory Status: Room air    Exams:  Cognitive Exam:  Patient is oriented to Person, Place, Time, and Situation  Sensation: -       Intact  RLE ROM: WFL  RLE Strength: -4/5 grossly  LLE ROM: WFL  LLE Strength: -4/5 grossly  Skin integrity: Visible skin intact      Functional Mobility:  Bed Mobility:     Supine to Sit: moderate assistance  Sit to Supine: minimum assistance  Transfers:  Sit to Stand:  minimum assistance with rolling walker  Gait: Ambulated 84 ft with RW & CGA-MIN A. Right knee buckled several times.   Balance: fair      AM-PAC 6 CLICK MOBILITY  Total Score:16     Education Provided:  Role and goals of PT, transfer training, bed mobility, gait training, balance training, safety awareness, assistive device, strengthening exercises, and importance of participating in PT to return to PLOF.    Patient left supine with all lines intact, call button in reach, and family present.    GOALS:   Multidisciplinary Problems       Physical Therapy Goals          Problem: Physical Therapy    Goal Priority Disciplines Outcome Goal Variances Interventions   Physical Therapy Goal     PT, PT/OT Ongoing, Progressing     Description: Goals to be met by: 23     Patient will increase functional independence with mobility by performin. Supine to sit with Set-up [place]  2. Sit to supine with Set-up [place]  3. Sit to stand transfer with Supervision  4. Gait  x 300 feet with Supervision using Rolling Walker.   5. Ascend/descend 5 stairs with bilateral  Handrails & Stand-by Assistance                          History:     Past Medical History:   Diagnosis Date    Chronic hepatitis B     DM (diabetes mellitus)     HLD (hyperlipidemia)     HTN (hypertension)     OA (osteoarthritis)     TIA (transient ischemic attack)        Past Surgical History:   Procedure Laterality Date     SECTION      COLONOSCOPY N/A 10/21/2023    Procedure: COLONOSCOPY;  Surgeon: Jasper Davis MD;  Location: [facility];  Service: Gastroenterology;  Laterality: N/A;    COLONOSCOPY, WITH POLYPECTOMY USING SNARE  10/21/2023    Procedure: COLONOSCOPY, WITH POLYPECTOMY USING SNARE;  Surgeon: Jasper Davis MD;  Location: [facility] OR;  Service: Gastroenterology;;    ESOPHAGOGASTRODUODENOSCOPY N/A 10/20/2023    Procedure: EGD;  Surgeon: Cody Urbina MD;  Location: [facility] ENDOSCOPY;  Service: Gastroenterology;  Laterality: N/A;    HYSTERECTOMY      JOINT REPLACEMENT         Time Tracking:     PT Received On: 10/25/23  PT Start Time: 1400     PT Stop Time: 1418  PT Total Time (min): 18 min     Billable Minutes: Evaluation 18 minutes      10/25/2023

## 2023-10-25 NOTE — PLAN OF CARE
Problem: Physical Therapy  Goal: Physical Therapy Goal  Description: Goals to be met by: 23     Patient will increase functional independence with mobility by performin. Supine to sit with Set[place]up [place]  2. Sit to supine with Set[place]up [place]  3. Sit to stand transfer with Supervision  4. Gait  x 300 feet with Supervision using Rolling Walker.   5. Ascend/descend 5 stairs with bilateral Handrails & Stand-by Assistance     Outcome: Ongoing, Progressing

## 2023-10-25 NOTE — CONSULTS
Tonie Tejada 1940  [id number]  10/25/2023    CONSULTING PHYSICIAN: Dr. Jerald Bowers    REASON FOR CONSULTATION: Urinary retention    HPI:  The patient is an 83-year-old female with a past medical history of diabetes, hypertension, chronic kidney disease stage 3, TIA, chronic hepatitis-B who presented to the emergency room on 10/19/2023 with melena/black stools.  UA from 10/22 of cloudy yellow urine, negative nitrites, 2+ leukocytes, less than 5 RBC, 29 WBC, 3+ bacteria, 12 squamous epithelial cells; urine culture with 3 separate organisms, likely contamination.  She was complaining of some lower abdominal discomfort and urinary retention yesterday, straight catheterization with 550 mL. She was able to void this morning about 100 ml urine, post void bladder scan with significant retention. Indwelling yen placed with 900ml urine obtained.     The patient is currently resting in bed.  She is not had any discomfort from Yen, she is been resting well since Yen was inserted.  Family at the bedside reports she is been struggling with hemorrhoids and difficulty sitting and moving around due to discomfort.  She is not had a bowel movement in a few days according to family.  She also has been having some generalized weakness.    Past Medical History:   Diagnosis Date    Chronic hepatitis B     DM (diabetes mellitus)     HLD (hyperlipidemia)     HTN (hypertension)     OA (osteoarthritis)     TIA (transient ischemic attack)      Past Surgical History:   Procedure Laterality Date     SECTION      COLONOSCOPY N/A 10/21/2023    Procedure: COLONOSCOPY;  Surgeon: Jasper Davis MD;  Location: [facility];  Service: Gastroenterology;  Laterality: N/A;    COLONOSCOPY, WITH POLYPECTOMY USING SNARE  10/21/2023    Procedure: COLONOSCOPY, WITH POLYPECTOMY USING SNARE;  Surgeon: Jasper Davis MD;  Location: [facility] OR;  Service: Gastroenterology;;    ESOPHAGOGASTRODUODENOSCOPY N/A 10/20/2023    Procedure: EGD;   Surgeon: Cody Urbina MD;  Location: [facility] ENDOSCOPY;  Service: Gastroenterology;  Laterality: N/A;    HYSTERECTOMY      JOINT REPLACEMENT       History reviewed. No pertinent family history.  Social History     Tobacco Use    Smoking status: Former    Smokeless tobacco: Never    Tobacco comments:     states a pack would last a week, quit 50 yrs ago   Substance Use Topics    Alcohol use: Not Currently    Drug use: Never     Current Facility-Administered Medications   Medication Dose Route Frequency Provider Last Rate Last Admin    acetaminophen tablet 1,000 mg  1,000 mg Oral Q6H PRN Lashawn Chaves MD   1,000 mg at 10/24/23 2158    acetaminophen tablet 650 mg  650 mg Oral Q4H PRN Lashawn Chaves MD        albuterol inhaler 2 puff  2 puff Inhalation Q4H PRN Lashawn Chaves MD        aluminum-magnesium hydroxide-simethicone 200-200-20 mg/5 mL suspension 30 mL  30 mL Oral QID PRN Lashawn Chaves MD        amLODIPine tablet 5 mg  5 mg Oral Daily Lashawn Chaves MD   5 mg at 10/25/23 1014    cefTRIAXone (ROCEPHIN) 1 g in dextrose 5 % in water (D5W) 100 mL IVPB (MB+)  1 g Intravenous Q24H Jerald Bowers MD        cloNIDine tablet 0.2 mg  0.2 mg Oral TID PRN Lashawn Chaves MD        dextrose 10% bolus 125 mL 125 mL  12.5 g Intravenous PRN Lashawn Chaves MD        dextrose 10% bolus 250 mL 250 mL  25 g Intravenous PRN Lashawn Chaves MD        glucagon (human recombinant) injection 1 mg  1 mg Intramuscular PRN Lashawn Chaves MD        hydrALAZINE injection 10 mg  10 mg Intravenous Q4H PRN Lashawn Chaves MD        hydrocortisone 1 % cream   Topical (Top) BID Jerald Bowers MD   Given at 10/25/23 1014    insulin aspart U-100 injection 1-10 Units  1-10 Units Subcutaneous Q6H PRN Lashawn Chaves MD        labetaloL injection 10 mg  10 mg Intravenous Q4H PRN Lashawn Chaves MD        lactated ringers infusion   Intravenous Continuous Jerald Bowers  mL/hr at 10/25/23 0644 Rate Verify at 10/25/23 0644    melatonin tablet 6 mg  6 mg Oral  Nightly PRN Lashawn Chaves MD   6 mg at 10/22/23 2145    metoprolol tartrate (LOPRESSOR) tablet 25 mg  25 mg Oral BID Lashawn Chaves MD   25 mg at 10/25/23 1014    ondansetron injection 4 mg  4 mg Intravenous Q4H PRN Lashawn Chaves MD        pantoprazole injection 40 mg  40 mg Intravenous BID Rupert Chandler PA-C   40 mg at 10/25/23 1014    polyethylene glycol packet 17 g  17 g Oral BID PRN Lashawn Chaves MD        pravastatin tablet 40 mg  40 mg Oral Daily Lashawn Chaves MD   40 mg at 10/25/23 1014    prochlorperazine injection Soln 5 mg  5 mg Intravenous Q6H PRN Lashawn Chaves MD        senna-docusate 8.6-50 mg per tablet 2 tablet  2 tablet Oral BID PRN Lashawn Chaves MD        sodium chloride 0.9% flush 10 mL  10 mL Intravenous PRN Lashawn Chaves MD   10 mL at 10/21/23 2118    tamsulosin 24 hr capsule 0.4 mg  0.4 mg Oral Daily Jerald Bowers MD   0.4 mg at 10/25/23 1014    tenofovir alafenamide tablet Tab 25 mg  25 mg Oral Daily Lashawn Chaves MD   25 mg at 10/25/23 0900     Review of patient's allergies indicates:  No Known Allergies    ROS: 12 point review of systems negative other than the HPI    PHYSICAL EXAM:  Vitals:    10/25/23 0724 10/25/23 0900 10/25/23 1014 10/25/23 1123   BP: 136/67  136/67 116/65   BP Location: Left arm   Left arm   Patient Position: Lying   Lying   Pulse: 75  75 74   Resp: 18   18   Temp: 98.8 °F (37.1 °C)   98.9 °F (37.2 °C)   TempSrc: Oral Oral  Oral   SpO2: 98%   100%   Weight:       Height:           Intake/Output Summary (Last 24 hours) at 10/25/2023 1203  Last data filed at 10/25/2023 1133  Gross per 24 hour   Intake 2723.81 ml   Output 1900 ml   Net 823.81 ml       GEN: WN/WD NAD  HEENT: normocephalic, atraumatic, PERRLA, EOMI, OP clear, nares patent  CV: RRR  RESP: Even and unlabored  ABD: soft, ND  : clear yellow urine draining to  bag  EXT: no C/C/E  NEURO: sleeping    LABS:  Recent Results (from the past 24 hour(s))   Basic Metabolic Panel    Collection Time: 10/25/23  4:21 AM    Result Value Ref Range    Sodium Level 136 136 - 145 mmol/L    Potassium Level 3.7 3.5 - 5.1 mmol/L    Chloride 107 98 - 107 mmol/L    Carbon Dioxide 21 (L) 23 - 31 mmol/L    Glucose Level 131 (H) 82 - 115 mg/dL    Blood Urea Nitrogen 22.7 (H) 9.8 - 20.1 mg/dL    Creatinine 1.47 (H) 0.55 - 1.02 mg/dL    BUN/Creatinine Ratio 15     Calcium Level Total 7.8 (L) 8.4 - 10.2 mg/dL    Anion Gap 8.0 mEq/L    eGFR 35 mls/min/1.73/m2       IMAGING:  Imaging Results    None         ASSESSMENT:  -urinary retention s/p yen  -melena  -external hemorrhoids    PLAN:  -Patient has been started on flomax  -Levsin prn for bladder spasms  -Continue indwelling yen. OK for voiding trial in 7-10 days. If patient is stable for d/c home prior to this, will d/c home with yen in place and she can f/u outpatient for voiding trial  -Discussed with nursing and family.   -Urology is signing off. Please call as needed with any issues.         Zoya Miller NP     Hypertension, unspecified type  Hx of hypertension not on meds at this time

## 2023-10-25 NOTE — PROGRESS NOTES
[facility]  Hospital Medicine Progress Note        Chief Complaint: Inpatient Follow-up for GI bleed.     Subjective:  This is an 83-year-old female medical history of T2DM, HTN, HLD, CKD 3b, TIA on aspirin, osteoarthritis, chronic hepatitis-B on Vemlidy, hemorrhoids.     Present to the ED with complaint of melena/black stool.  Report she had an episode of black stool last week that resolved on its own.  Today had an episode where she felt dizzy/lightheaded with diffuse abdominal pain and large loose black bowel movements.  Subsequently she felt back to her baseline and abdominal pain subsided.  Report she has not been taking her aspirin for the past 3 weeks/ran out.  Denies oral NSAID use.  Denies prior EGD or colonoscopy.  Report prior history of intermittent hemorrhoidal bleed.     On arrival to ED she was afebrile and hemodynamically stable.  Labs notable for hemoglobin 12.3 > 11.6.  Rectal exam by ED physician show melanotic guaiac-positive stool.     She was given Protonix 80 mg IV, GI consulted and referred to hospital medicine service for further evaluation and management.    Patient is in colonoscopy today. No acute issues, chart was reviewed.     Physical exam  General: Oriented x3, no acute distress  Cardiac: Regular rate and rhythm, no murmur or gallop  Respiratory clear bilaterally, on room air, nonlabored  Abdomen soft, Non-tender, Non-distended, no rigidity or rebound.  Extremities no edema.  Neurology is CN 2 through 12 intact, sensorimotor grossly intact      VITAL SIGNS: 24 HRS MIN & MAX LAST   Temp  Min: 98.4 °F (36.9 °C)  Max: 99.6 °F (37.6 °C) 99.3 °F (37.4 °C)   BP  Min: 111/61  Max: 136/67 133/77   Pulse  Min: 71  Max: 90  86   Resp  Min: 18  Max: 18 18   SpO2  Min: 96 %  Max: 100 % 99 %       Labs, Microbiology and Imaging were Reviewed.      Microbiology Results (last 7 days)       Procedure Component Value Units Date/Time    Urine culture [[id number]]   (Abnormal) Collected: 10/22/23 1620    Order Status: Completed Specimen: Urine Updated: 10/24/23 3416     Urine Culture Multiple organisms present indicate probable contamination. Recommend recollection.      25,000-50,000 colonies/ml GAMMA STREPTOCOCCUS      25,000-50,000 colonies/ml Gram-negative Rods      10,000 - 25,000 colonies/ml Presumptive proteus species               Medications   amLODIPine  5 mg Oral Daily    cefTRIAXone (ROCEPHIN) IVPB  1 g Intravenous Q24H    hydrocortisone   Topical (Top) BID    metoprolol tartrate  25 mg Oral BID    pantoprazole  40 mg Intravenous BID    pravastatin  40 mg Oral Daily    tamsulosin  0.4 mg Oral Daily    tenofovir alafenamide  25 mg Oral Daily        acetaminophen, acetaminophen, albuterol, aluminum-magnesium hydroxide-simethicone, cloNIDine, dextrose 10%, dextrose 10%, glucagon (human recombinant), hydrALAZINE, hyoscyamine, insulin aspart U-100, labetalol, melatonin, ondansetron, polyethylene glycol, prochlorperazine, senna-docusate 8.6-50 mg, sodium chloride 0.9%     Radiology:  MRI Brain Without Contrast  Narrative: EXAMINATION:  MRI BRAIN WITHOUT CONTRAST    CLINICAL HISTORY:  Transient ischemic attack (TIA);rule out TIA;    TECHNIQUE:  Multiplanar, multisequence MR images of the brain were obtained without the administration of intravenous contrast.    COMPARISON:  MRI brain dated 07/19/2022    FINDINGS:  There is no restricted diffusion, hemorrhage or edema.  Moderate patchy T2/FLAIR hyperintensities in the subcortical and periventricular white matter likely represent chronic microvascular ischemic changes.    There is no mass effect or midline shift.  The basal cisterns are patent.  There is moderate diffuse parenchymal volume loss.  There is no hydrocephalus or abnormal extra-axial fluid collection.  The major intracranial flow voids are patent.  There is a left mastoid effusion.  Impression: 1. No acute intracranial abnormality.  2. Moderate chronic  microvascular ischemic changes.    Electronically signed by: Aleja Benito  Date:    10/23/2023  Time:    10:04          Assessment/Plan:  Acute GI bleed/melena  CKD 3b -at baseline     History of chronic hepatitis-B on Vemlidy, HTN, HLD, T2DM, TIA on aspirin, osteoarthritis    Seen and examined the patient.  Afebrile vitals stable hemodynamically stable denied pain or discomfort.    EGD is unrevealing.  GI put an order for colonoscopy.    Patient has mild abdominal pain.  Continue Protonix for now.  Will continue follow.     All diagnosis and differential diagnosis have been reviewed; assessment and plan has been documented; I have personally reviewed the labs and test results that are presently available; I have reviewed the patients medication list; I have reviewed the consulting providers response and recommendations. I have reviewed or attempted to review medical records based upon their availability.       Jerald Bowers MD   10/25/2023

## 2023-10-25 NOTE — PROGRESS NOTES
Inpatient Nutrition Evaluation    Admit Date: 10/19/2023   Total duration of encounter: 6 days    Nutrition Recommendation/Prescription     Diabetic diet as tolerated    Chocolate Boost GC BID. 190 kcals and 14 g protein per serving.     Nutrition Assessment     Chart Review    Reason Seen: length of stay    Malnutrition Screening Tool Results   Have you recently lost weight without trying?: No  Have you been eating poorly because of a decreased appetite?: No   MST Score: 0     Diagnosis:  Acute GI bleed/melena  CKD 3b -at baseline  External hemorrhoids  Urinary retention    Relevant Medical History:   Past Medical History:   Diagnosis Date    Chronic hepatitis B     DM (diabetes mellitus)     HLD (hyperlipidemia)     HTN (hypertension)     OA (osteoarthritis)     TIA (transient ischemic attack)      Review of patient's allergies indicates:  No Known Allergies     Nutrition-Related Medications:    amLODIPine  5 mg Oral Daily    cefTRIAXone (ROCEPHIN) IVPB  1 g Intravenous Q24H    hydrocortisone   Topical (Top) BID    metoprolol tartrate  25 mg Oral BID    pantoprazole  40 mg Intravenous BID    pravastatin  40 mg Oral Daily    tamsulosin  0.4 mg Oral Daily    tenofovir alafenamide  25 mg Oral Daily       lactated ringers 125 mL/hr at 10/25/23 0644   Insulin PRN      Calorie Containing IV Medications: no significant kcals from medications at this time    Nutrition-Related Labs:  Hemoglobin A1c   Date Value Ref Range Status   10/20/2023 5.8 <=7.0 % Final   10/05/2018 6.6 (H) 4.2 - 6.3 % Final     10/20/2023: Phosphorus Level 4.3 mg/dL (Ref range: 2.3 - 4.7 mg/dL)  10/21/2023: Magnesium Level 2.10 mg/dL (Ref range: 1.60 - 2.60 mg/dL)  10/25/2023: Potassium Level 3.7 mmol/L (Ref range: 3.5 - 5.1 mmol/L); Sodium Level 136 mmol/L (Ref range: 136 - 145 mmol/L)   Recent Labs   Lab 10/22/23  0545 10/23/23  1006 10/24/23  0944   WBC 8.20 14.41* 10.89   RBC 3.89* 3.65* 3.28*   HGB 11.4* 10.7* 9.7*   HCT 34.2* 32.3* 29.3*   MCV  87.9 88.5 89.3   MCH 29.3 29.3 29.6   MCHC 33.3 33.1 33.1     Recent Labs   Lab 10/20/23  0426 10/21/23  0347 10/22/23  0545 10/23/23  1006 10/24/23  0944 10/25/23  0421    140 141 139 137 136   K 4.3 4.3 3.8 3.8 4.0 3.7   CO2 23 21* 21* 24 23 21*   BUN 23.5* 19.3 22.9* 23.2* 25.9* 22.7*   CREATININE 1.38* 1.33* 1.89* 2.02* 2.10* 1.47*   CALCIUM 9.0 8.9 7.8* 8.0* 8.1* 7.8*   MG 2.10 2.10  --   --   --   --    ALBUMIN  --  3.6 3.4 3.2* 2.9*  --    ALKPHOS  --  69 67 63 56  --    ALT  --  12 11 10 10  --    AST  --  19 19 18 16  --    BILITOT  --  0.8 0.9 0.9 0.7  --        Diet Order: Diet Adult Regular  Oral Supplement Order: none  Appetite/Oral Intake: good/% of meals  Factors Affecting Nutritional Intake: none identified  Food/Advent/Cultural Preferences: none reported  Food Allergies: none reported       Wound(s):   n/a    Comments    10/25/23: Pt reports good appetite, chewing/swallowing well, only GI complaint is mild intermittent abdominal pain, no unintentional weight loss, would like chocolate Boost.     Anthropometrics    Height: 5' (152.4 cm) Height Method: Stated  Last Weight: 77.6 kg (171 lb 1.2 oz) (10/19/23 2035) Weight Method: Bed Scale  BMI (Calculated): 33.4  BMI Classification: obese grade I (BMI 30-34.9)     Ideal Body Weight (IBW), Female: 100 lb     % Ideal Body Weight, Female (lb): 171.08 %                             Usual Weight Provided By: patient and EMR weight history 165 lbs UBW    Wt Readings from Last 5 Encounters:   10/19/23 77.6 kg (171 lb 1.2 oz)   09/06/23 78.2 kg (172 lb 6.4 oz)   05/15/23 74.3 kg (163 lb 12.8 oz)   01/31/23 (P) 73.8 kg (162 lb 11.2 oz)   08/17/22 76.2 kg (168 lb)     Weight Change(s) Since Admission:  Admit Weight: 74.4 kg (164 lb) (10/19/23 1359)      Patient Education    Not applicable.    Monitoring & Evaluation     Dietitian will monitor food and beverage intake, weight, electrolyte/renal panel, glucose/endocrine profile, and gastrointestinal  profile.  Nutrition Risk/Follow-Up: low (follow-up in 5-7 days)  Patients assigned 'low nutrition risk' status do not qualify for a full nutritional assessment but will be monitored and re-evaluated in a 5-7 day time period. Please consult if re-evaluation needed sooner.

## 2023-10-25 NOTE — PROGRESS NOTES
[facility]  Hospital Medicine Progress Note        Chief Complaint: Inpatient Follow-up for GI bleed.     Subjective:  This is an 83-year-old female medical history of T2DM, HTN, HLD, CKD 3b, TIA on aspirin, osteoarthritis, chronic hepatitis-B on Vemlidy, hemorrhoids.     Present to the ED with complaint of melena/black stool.  Report she had an episode of black stool last week that resolved on its own.  Today had an episode where she felt dizzy/lightheaded with diffuse abdominal pain and large loose black bowel movements.  Subsequently she felt back to her baseline and abdominal pain subsided.  Report she has not been taking her aspirin for the past 3 weeks/ran out.  Denies oral NSAID use.  Denies prior EGD or colonoscopy.  Report prior history of intermittent hemorrhoidal bleed.     On arrival to ED she was afebrile and hemodynamically stable.  Labs notable for hemoglobin 12.3 > 11.6.  Rectal exam by ED physician show melanotic guaiac-positive stool.     She was given Protonix 80 mg IV, GI consulted and referred to hospital medicine service for further evaluation and management.  Patient received EGD and colonoscopy which was unrevealing.  Colonoscopy showed sigmoid colon which was removed.  Hypertrophied anal papules exam.  Examined the rectum and the patient has stage III external hemorrhoids.  General surgery consulted they recommended outpatient follow-up.  Had an episode of altered consciousness which was very brief did not have postictal confusion got EEG and MRI was negative for acute findings.  No seizure like episodes afterwards crit    Continue to have urinary retention Ann was placed and Urology was called.    She was not able to get out of the bed at all, PT OT was consulted.    Today patient is afebrile vitals stable hemodynamically stable.      Physical Exam:  General: Oriented x3, no acute distress  Cardiac: Regular rate and rhythm, no murmur or gallop  Respiratory  clear bilaterally, on room air, nonlabored  Abdomen soft, Non-tender, Non-distended, no rigidity or rebound.  Extremities no edema.  Neurology is CN 2 through 12 intact, sensorimotor grossly intact.          VITAL SIGNS: 24 HRS MIN & MAX LAST   Temp  Min: 98.4 °F (36.9 °C)  Max: 99.6 °F (37.6 °C) 99.3 °F (37.4 °C)   BP  Min: 111/61  Max: 136/67 133/77   Pulse  Min: 71  Max: 90  86   Resp  Min: 18  Max: 18 18   SpO2  Min: 96 %  Max: 100 % 99 %       Labs, Microbiology and Imaging were Reviewed.      Microbiology Results (last 7 days)       Procedure Component Value Units Date/Time    Urine culture [[id number]]  (Abnormal) Collected: 10/22/23 1620    Order Status: Completed Specimen: Urine Updated: 10/24/23 0928     Urine Culture Multiple organisms present indicate probable contamination. Recommend recollection.      25,000-50,000 colonies/ml GAMMA STREPTOCOCCUS      25,000-50,000 colonies/ml Gram-negative Rods      10,000 - 25,000 colonies/ml Presumptive proteus species               Medications   amLODIPine  5 mg Oral Daily    cefTRIAXone (ROCEPHIN) IVPB  1 g Intravenous Q24H    hydrocortisone   Topical (Top) BID    metoprolol tartrate  25 mg Oral BID    pantoprazole  40 mg Intravenous BID    pravastatin  40 mg Oral Daily    tamsulosin  0.4 mg Oral Daily    tenofovir alafenamide  25 mg Oral Daily        acetaminophen, acetaminophen, albuterol, aluminum-magnesium hydroxide-simethicone, cloNIDine, dextrose 10%, dextrose 10%, glucagon (human recombinant), hydrALAZINE, hyoscyamine, insulin aspart U-100, labetalol, melatonin, ondansetron, polyethylene glycol, prochlorperazine, senna-docusate 8.6-50 mg, sodium chloride 0.9%     Radiology:  MRI Brain Without Contrast  Narrative: EXAMINATION:  MRI BRAIN WITHOUT CONTRAST    CLINICAL HISTORY:  Transient ischemic attack (TIA);rule out TIA;    TECHNIQUE:  Multiplanar, multisequence MR images of the brain were obtained without the administration of intravenous  contrast.    COMPARISON:  MRI brain dated 07/19/2022    FINDINGS:  There is no restricted diffusion, hemorrhage or edema.  Moderate patchy T2/FLAIR hyperintensities in the subcortical and periventricular white matter likely represent chronic microvascular ischemic changes.    There is no mass effect or midline shift.  The basal cisterns are patent.  There is moderate diffuse parenchymal volume loss.  There is no hydrocephalus or abnormal extra-axial fluid collection.  The major intracranial flow voids are patent.  There is a left mastoid effusion.  Impression: 1. No acute intracranial abnormality.  2. Moderate chronic microvascular ischemic changes.    Electronically signed by: Aleja Benito  Date:    10/23/2023  Time:    10:04          Assessment/Plan:  Acute GI bleed/melena  CKD 3b -at baseline  External hemorrhoids  Urinary retention     History of chronic hepatitis-B on Vemlidy, HTN, HLD, T2DM, TIA on aspirin, osteoarthritis      - Consult General surgery evaluation for external hemorrhoids   - VAHID, encourage p.o. intake and fluid intake.    -place Ann due to continuous urinary retention,   -started on ceftriaxone due to these symptoms.  Continue for 2 more days to complete therapy.    -PT was consulted patient was not mobile through the hospital stay, appreciate their recommendations.    -if is able to work with PT wood discharge tomorrow.  EEG and MRI both were negative for seizure and stroke.  - carotid duplex in 7/22 was negative, echocardiogram in 7/22 was also negative and EF was normal with normal left atrium and right atrium  Colonoscopy is unrevealing.    -has large stage III external hemorrhoids for which General surgery was consulted and recommended outpatient follow-up.    If improving creatinine will discharge patient tomorrow.    All diagnosis and differential diagnosis have been reviewed; assessment and plan has been documented; I have personally reviewed the labs and test results that are  presently available; I have reviewed the patients medication list; I have reviewed the consulting providers response and recommendations. I have reviewed or attempted to review medical records based upon their availability.       Jerald Bowers MD   10/25/2023

## 2023-10-26 VITALS
SYSTOLIC BLOOD PRESSURE: 133 MMHG | WEIGHT: 171.06 LBS | RESPIRATION RATE: 18 BRPM | BODY MASS INDEX: 33.58 KG/M2 | HEART RATE: 75 BPM | DIASTOLIC BLOOD PRESSURE: 71 MMHG | TEMPERATURE: 99 F | OXYGEN SATURATION: 99 % | HEIGHT: 60 IN

## 2023-10-26 LAB
ANION GAP SERPL CALC-SCNC: 7 MEQ/L
BASOPHILS # BLD AUTO: 0.02 X10(3)/MCL
BASOPHILS NFR BLD AUTO: 0.2 %
BUN SERPL-MCNC: 14.7 MG/DL (ref 9.8–20.1)
CALCIUM SERPL-MCNC: 7.7 MG/DL (ref 8.4–10.2)
CHLORIDE SERPL-SCNC: 110 MMOL/L (ref 98–107)
CO2 SERPL-SCNC: 23 MMOL/L (ref 23–31)
CREAT SERPL-MCNC: 1.18 MG/DL (ref 0.55–1.02)
CREAT/UREA NIT SERPL: 12
EOSINOPHIL # BLD AUTO: 0.14 X10(3)/MCL (ref 0–0.9)
EOSINOPHIL NFR BLD AUTO: 1.2 %
ERYTHROCYTE [DISTWIDTH] IN BLOOD BY AUTOMATED COUNT: 12.6 % (ref 11.5–17)
GFR SERPLBLD CREATININE-BSD FMLA CKD-EPI: 46 MLS/MIN/1.73/M2
GLUCOSE SERPL-MCNC: 119 MG/DL (ref 82–115)
HCT VFR BLD AUTO: 27.3 % (ref 37–47)
HGB BLD-MCNC: 9.1 G/DL (ref 12–16)
IMM GRANULOCYTES # BLD AUTO: 0.07 X10(3)/MCL (ref 0–0.04)
IMM GRANULOCYTES NFR BLD AUTO: 0.6 %
LYMPHOCYTES # BLD AUTO: 1.38 X10(3)/MCL (ref 0.6–4.6)
LYMPHOCYTES NFR BLD AUTO: 12.3 %
MCH RBC QN AUTO: 29.1 PG (ref 27–31)
MCHC RBC AUTO-ENTMCNC: 33.3 G/DL (ref 33–36)
MCV RBC AUTO: 87.2 FL (ref 80–94)
MONOCYTES # BLD AUTO: 1.3 X10(3)/MCL (ref 0.1–1.3)
MONOCYTES NFR BLD AUTO: 11.5 %
NEUTROPHILS # BLD AUTO: 8.35 X10(3)/MCL (ref 2.1–9.2)
NEUTROPHILS NFR BLD AUTO: 74.2 %
NRBC BLD AUTO-RTO: 0 %
PLATELET # BLD AUTO: 148 X10(3)/MCL (ref 130–400)
PMV BLD AUTO: 12 FL (ref 7.4–10.4)
POCT GLUCOSE: 179 MG/DL (ref 70–110)
POTASSIUM SERPL-SCNC: 3.8 MMOL/L (ref 3.5–5.1)
RBC # BLD AUTO: 3.13 X10(6)/MCL (ref 4.2–5.4)
SODIUM SERPL-SCNC: 140 MMOL/L (ref 136–145)
WBC # SPEC AUTO: 11.26 X10(3)/MCL (ref 4.5–11.5)

## 2023-10-26 PROCEDURE — 63600175 PHARM REV CODE 636 W HCPCS: Performed by: STUDENT IN AN ORGANIZED HEALTH CARE EDUCATION/TRAINING PROGRAM

## 2023-10-26 PROCEDURE — 25000003 PHARM REV CODE 250: Performed by: INTERNAL MEDICINE

## 2023-10-26 PROCEDURE — 63600175 PHARM REV CODE 636 W HCPCS: Performed by: PHYSICIAN ASSISTANT

## 2023-10-26 PROCEDURE — 85025 COMPLETE CBC W/AUTO DIFF WBC: CPT | Performed by: STUDENT IN AN ORGANIZED HEALTH CARE EDUCATION/TRAINING PROGRAM

## 2023-10-26 PROCEDURE — 97166 OT EVAL MOD COMPLEX 45 MIN: CPT

## 2023-10-26 PROCEDURE — 80048 BASIC METABOLIC PNL TOTAL CA: CPT | Performed by: STUDENT IN AN ORGANIZED HEALTH CARE EDUCATION/TRAINING PROGRAM

## 2023-10-26 PROCEDURE — 25000003 PHARM REV CODE 250: Performed by: STUDENT IN AN ORGANIZED HEALTH CARE EDUCATION/TRAINING PROGRAM

## 2023-10-26 PROCEDURE — 97110 THERAPEUTIC EXERCISES: CPT

## 2023-10-26 PROCEDURE — C9113 INJ PANTOPRAZOLE SODIUM, VIA: HCPCS | Performed by: PHYSICIAN ASSISTANT

## 2023-10-26 RX ORDER — HYOSCYAMINE SULFATE 0.12 MG/1
0.12 TABLET SUBLINGUAL EVERY 4 HOURS PRN
Qty: 20 TABLET | Refills: 0 | Status: ON HOLD | OUTPATIENT
Start: 2023-10-26 | End: 2023-12-01 | Stop reason: HOSPADM

## 2023-10-26 RX ORDER — TAMSULOSIN HYDROCHLORIDE 0.4 MG/1
0.4 CAPSULE ORAL DAILY
Qty: 30 CAPSULE | Refills: 0 | Status: ON HOLD | OUTPATIENT
Start: 2023-10-26 | End: 2023-12-01 | Stop reason: HOSPADM

## 2023-10-26 RX ORDER — HYDROCORTISONE 1 %
CREAM (GRAM) TOPICAL 2 TIMES DAILY
Qty: 10 G | Refills: 0 | Status: ON HOLD | OUTPATIENT
Start: 2023-10-26 | End: 2023-12-01 | Stop reason: HOSPADM

## 2023-10-26 RX ORDER — CEFDINIR 300 MG/1
300 CAPSULE ORAL 2 TIMES DAILY
Qty: 4 CAPSULE | Refills: 0 | Status: SHIPPED | OUTPATIENT
Start: 2023-10-26 | End: 2023-10-28

## 2023-10-26 RX ADMIN — TAMSULOSIN HYDROCHLORIDE 0.4 MG: 0.4 CAPSULE ORAL at 09:10

## 2023-10-26 RX ADMIN — TENOFOVIR ALAFENAMIDE 25 MG: 25 TABLET ORAL at 09:10

## 2023-10-26 RX ADMIN — SODIUM CHLORIDE, POTASSIUM CHLORIDE, SODIUM LACTATE AND CALCIUM CHLORIDE: 600; 310; 30; 20 INJECTION, SOLUTION INTRAVENOUS at 03:10

## 2023-10-26 RX ADMIN — PRAVASTATIN SODIUM 40 MG: 40 TABLET ORAL at 09:10

## 2023-10-26 RX ADMIN — POLYETHYLENE GLYCOL 3350 17 G: 17 POWDER, FOR SOLUTION ORAL at 09:10

## 2023-10-26 RX ADMIN — METOPROLOL TARTRATE 25 MG: 25 TABLET, FILM COATED ORAL at 09:10

## 2023-10-26 RX ADMIN — SENNOSIDES AND DOCUSATE SODIUM 1 TABLET: 50; 8.6 TABLET ORAL at 09:10

## 2023-10-26 RX ADMIN — PANTOPRAZOLE SODIUM 40 MG: 40 INJECTION, POWDER, FOR SOLUTION INTRAVENOUS at 09:10

## 2023-10-26 RX ADMIN — AMLODIPINE BESYLATE 5 MG: 5 TABLET ORAL at 09:10

## 2023-10-26 RX ADMIN — CEFTRIAXONE SODIUM 1 G: 1 INJECTION, POWDER, FOR SOLUTION INTRAMUSCULAR; INTRAVENOUS at 11:10

## 2023-10-26 NOTE — PLAN OF CARE
Problem: Occupational Therapy  Goal: Occupational Therapy Goal  Description: Goals to be met by: 11/23/23     Patient will increase functional independence with ADLs by performing:    UE Dressing with Modified [place].  LE Dressing with Modified [place] and Assistive Devices as needed.  Grooming while standing at sink with Modified [place].  Toileting from toilet with Modified [place] for hygiene and clothing management.   Toilet transfer to toilet with Modified [place].    Outcome: Ongoing, Progressing

## 2023-10-26 NOTE — PT/OT/SLP EVAL
Occupational Therapy  Evaluation    Name: Tonie Tejada  MRN: [id number]  Admitting Diagnosis: gastrointestinal hemorrhage   Recent Surgery: Procedure(s) (LRB):  COLONOSCOPY (N/A)  COLONOSCOPY, WITH POLYPECTOMY USING SNARE 5 Days Post-Op    Recommendations:     Discharge therapy intensity: Low Intensity Therapy   Discharge Equipment Recommendations:  none  Barriers to discharge:  None    Assessment:     Tonie Tejada is a 83 y.o. female with a medical diagnosis of gastrointestinal hemorrhage. She presented with good effort throughout, mostly limited by decreased endurance and pain from hemorrhoids. Pt required min A for bed mobility, min A to stand and max A with LB dressing d/t pain. Pt was able to tolerate sitting in recliner on donut to increase strength/endurance. Pt would benefit from skilled OT during acute stay to get back to PLOF which was independent with all ADL/IADLs.  She presents with the following performance deficits affecting function: weakness, impaired self care skills, impaired functional mobility, pain, impaired endurance.     Rehab Prognosis: Good; patient would benefit from acute skilled OT services to address these deficits and reach maximum level of function.       Plan:     Patient to be seen 5 x/week to address the above listed problems via self-care/home management, therapeutic activities, therapeutic exercises  Plan of Care Expires: 11/23/23  Plan of Care Reviewed with: patient, daughter    Subjective     Chief Complaint: pain   Patient/Family Comments/goals: get back to PLOF    Occupational Profile:  Living Environment: lives with , has 5 adult children, tub/shower  Previous level of function: Mod I/I  Roles and Routines: wife, mother  Equipment Used at Home: rollator  Assistance upon Discharge: family    Pain/Comfort:  Location 1:  (buttocks)  Pain Addressed 1: Distraction    Patients cultural, spiritual, Jew conflicts given the current situation:      Objective:      OT communicated with Nsg prior to session.      Patient was found HOB elevated with peripheral IV, yen catheter, telemetry upon OT entry to room.    General Precautions: Standard, fall  Orthopedic Precautions: N/A  Braces: N/A      Bed Mobility:    Patient completed Supine to Sit with minimum assistance    Functional Mobility/Transfers:  Patient completed Sit <> Stand Transfer with min A-CGA  with  rolling walker   Functional Mobility: ambulated length of hospital room with RW and CGA, limited by pain    Activities of Daily Living:  Lower Body Dressing: maximal assistance don/doff socks    Functional Cognition:  Intact      Upper Extremity Function:  Right Upper Extremity:   WFL    Left Upper Extremity:  WFL    Balance:   Intact    Therapeutic Positioning  Risk for acquired pressure injuries is increased due to relative decrease in mobility d/t hospitalization .      Additional Treatment:  Therapeutic Exercise: sit <> stands between transfers/ applying cream to hemorrhoids/ repositioning in recliner; improved sit to stand with decrease support with each rep    Patient Education:  Patient provided with verbal education education regarding OT role/goals/POC.  Understanding was verbalized.     Patient left up in chair with all lines intact and call button in reach    GOALS:   Multidisciplinary Problems       Occupational Therapy Goals          Problem: Occupational Therapy    Goal Priority Disciplines Outcome Interventions   Occupational Therapy Goal     OT, PT/OT Ongoing, Progressing    Description: Goals to be met by: 11/23/23     Patient will increase functional independence with ADLs by performing:    UE Dressing with Modified [place].  LE Dressing with Modified [place] and Assistive Devices as needed.  Grooming while standing at sink with Modified [place].  Toileting from toilet with Modified [place] for hygiene and clothing management.   Toilet transfer to toilet with Modified  [place].                         History:     Past Medical History:   Diagnosis Date    Chronic hepatitis B     DM (diabetes mellitus)     HLD (hyperlipidemia)     HTN (hypertension)     OA (osteoarthritis)     TIA (transient ischemic attack)          Past Surgical History:   Procedure Laterality Date     SECTION      COLONOSCOPY N/A 10/21/2023    Procedure: COLONOSCOPY;  Surgeon: Jasper Davis MD;  Location: [facility];  Service: Gastroenterology;  Laterality: N/A;    COLONOSCOPY, WITH POLYPECTOMY USING SNARE  10/21/2023    Procedure: COLONOSCOPY, WITH POLYPECTOMY USING SNARE;  Surgeon: Jasper Davis MD;  Location: [facility] OR;  Service: Gastroenterology;;    ESOPHAGOGASTRODUODENOSCOPY N/A 10/20/2023    Procedure: EGD;  Surgeon: Cody Urbina MD;  Location: [facility] ENDOSCOPY;  Service: Gastroenterology;  Laterality: N/A;    HYSTERECTOMY      JOINT REPLACEMENT         Time Tracking:     OT Date of Treatment: 10/26/23  OT Start Time: 922  OT Stop Time: 954  OT Total Time (min): 32 min    Billable Minutes:Evaluation 23 min. Mod Complexity, therapeutic ex 9 min     10/26/2023

## 2023-10-26 NOTE — PLAN OF CARE
10/26/23 1100   Final Note   Assessment Type Final Discharge Note   Anticipated Discharge Disposition Home-Health   Hospital Resources/Appts/Education Provided Post-Acute resouces added to AVS   Post-Acute Status   Post-Acute Authorization Home Health   Home Health Status Set-up Complete/Auth obtained   Discharge Delays None known at this time     Discharge information sent via [facility].

## 2023-11-01 NOTE — PROGRESS NOTES
Date of Service: 5/15/2023    Attending Attestation: Patient discussed with resident. The chart was reviewed thoroughly including pertinent vitals, labs, imaging, medications, prior notes, and consultant/specialist recommendations.  I participated in the management of the patient, examined the patient, reviewed the summary of the plan, and was immediately available at all times throughout the encounter. Services were furnished in a primary care center located in the outpatient department of a [facility] hospital. I agree with the resident's findings and plan as documented in the resident's note.    Birgit He MD  Attending - Family Medicine / Geriatric Medicine  [facility]

## 2023-11-06 ENCOUNTER — TELEPHONE (OUTPATIENT)
Dept: SURGICAL ONCOLOGY | Facility: CLINIC | Age: 83
End: 2023-11-06
Payer: MEDICARE

## 2023-11-06 NOTE — TELEPHONE ENCOUNTER
Received phone call from patient's daughter stating that her mother is in so much pain with the hemorrhoids and unfortunately doesn't have an appointment with Dr. Garcia until Monday 11/13. She states the patient cannot sleep or sit comfortably without issue. She is asking if there's any way we can fit her in sometime this week to see him sooner and is asking for you to return her call. Thanks!

## 2023-11-09 ENCOUNTER — HOSPITAL ENCOUNTER (INPATIENT)
Facility: HOSPITAL | Age: 83
LOS: 8 days | Discharge: NURSING, REHAB OR LONG-TERM CARE | DRG: 345 | End: 2023-11-17
Attending: EMERGENCY MEDICINE | Admitting: INTERNAL MEDICINE
Payer: MEDICARE

## 2023-11-09 ENCOUNTER — OFFICE VISIT (OUTPATIENT)
Dept: SURGICAL ONCOLOGY | Facility: CLINIC | Age: 83
End: 2023-11-09
Payer: MEDICARE

## 2023-11-09 VITALS — DIASTOLIC BLOOD PRESSURE: 78 MMHG | SYSTOLIC BLOOD PRESSURE: 131 MMHG | HEART RATE: 81 BPM

## 2023-11-09 DIAGNOSIS — K64.8 GANGRENE OF HEMORRHOID: Primary | ICD-10-CM

## 2023-11-09 DIAGNOSIS — K64.8 GANGRENE OF HEMORRHOID: ICD-10-CM

## 2023-11-09 DIAGNOSIS — I96 GANGRENE OF HEMORRHOID: Primary | ICD-10-CM

## 2023-11-09 DIAGNOSIS — I96 GANGRENE OF HEMORRHOID: ICD-10-CM

## 2023-11-09 DIAGNOSIS — B18.1 HEPATITIS B, CHRONIC: Primary | ICD-10-CM

## 2023-11-09 DIAGNOSIS — R07.9 CHEST PAIN: ICD-10-CM

## 2023-11-09 LAB
ALBUMIN SERPL-MCNC: 3.4 G/DL (ref 3.4–4.8)
ALBUMIN/GLOB SERPL: 0.9 RATIO (ref 1.1–2)
ALP SERPL-CCNC: 71 UNIT/L (ref 40–150)
ALT SERPL-CCNC: 10 UNIT/L (ref 0–55)
AST SERPL-CCNC: 14 UNIT/L (ref 5–34)
BASOPHILS # BLD AUTO: 0.04 X10(3)/MCL
BASOPHILS NFR BLD AUTO: 0.4 %
BILIRUB SERPL-MCNC: 0.7 MG/DL
BUN SERPL-MCNC: 13.3 MG/DL (ref 9.8–20.1)
CALCIUM SERPL-MCNC: 8.9 MG/DL (ref 8.4–10.2)
CHLORIDE SERPL-SCNC: 106 MMOL/L (ref 98–107)
CO2 SERPL-SCNC: 24 MMOL/L (ref 23–31)
CREAT SERPL-MCNC: 1.3 MG/DL (ref 0.55–1.02)
EOSINOPHIL # BLD AUTO: 0.06 X10(3)/MCL (ref 0–0.9)
EOSINOPHIL NFR BLD AUTO: 0.6 %
ERYTHROCYTE [DISTWIDTH] IN BLOOD BY AUTOMATED COUNT: 13.7 % (ref 11.5–17)
GFR SERPLBLD CREATININE-BSD FMLA CKD-EPI: 41 MLS/MIN/1.73/M2
GLOBULIN SER-MCNC: 3.9 GM/DL (ref 2.4–3.5)
GLUCOSE SERPL-MCNC: 116 MG/DL (ref 82–115)
HCT VFR BLD AUTO: 29.2 % (ref 37–47)
HGB BLD-MCNC: 9.2 G/DL (ref 12–16)
IMM GRANULOCYTES # BLD AUTO: 0.06 X10(3)/MCL (ref 0–0.04)
IMM GRANULOCYTES NFR BLD AUTO: 0.6 %
INR PPP: 1.2
LACTATE SERPL-SCNC: 1 MMOL/L (ref 0.5–2.2)
LYMPHOCYTES # BLD AUTO: 1.37 X10(3)/MCL (ref 0.6–4.6)
LYMPHOCYTES NFR BLD AUTO: 13.3 %
MCH RBC QN AUTO: 28 PG (ref 27–31)
MCHC RBC AUTO-ENTMCNC: 31.5 G/DL (ref 33–36)
MCV RBC AUTO: 89 FL (ref 80–94)
MONOCYTES # BLD AUTO: 0.86 X10(3)/MCL (ref 0.1–1.3)
MONOCYTES NFR BLD AUTO: 8.4 %
NEUTROPHILS # BLD AUTO: 7.89 X10(3)/MCL (ref 2.1–9.2)
NEUTROPHILS NFR BLD AUTO: 76.7 %
NRBC BLD AUTO-RTO: 0 %
PLATELET # BLD AUTO: 323 X10(3)/MCL (ref 130–400)
PMV BLD AUTO: 10.3 FL (ref 7.4–10.4)
POCT GLUCOSE: 171 MG/DL (ref 70–110)
POTASSIUM SERPL-SCNC: 4.2 MMOL/L (ref 3.5–5.1)
PROT SERPL-MCNC: 7.3 GM/DL (ref 5.8–7.6)
PROTHROMBIN TIME: 14.8 SECONDS (ref 12.5–14.5)
RBC # BLD AUTO: 3.28 X10(6)/MCL (ref 4.2–5.4)
SODIUM SERPL-SCNC: 140 MMOL/L (ref 136–145)
WBC # SPEC AUTO: 10.28 X10(3)/MCL (ref 4.5–11.5)

## 2023-11-09 PROCEDURE — 63600175 PHARM REV CODE 636 W HCPCS: Performed by: INTERNAL MEDICINE

## 2023-11-09 PROCEDURE — 25500020 PHARM REV CODE 255: Performed by: EMERGENCY MEDICINE

## 2023-11-09 PROCEDURE — 25000003 PHARM REV CODE 250: Performed by: INTERNAL MEDICINE

## 2023-11-09 PROCEDURE — 63600175 PHARM REV CODE 636 W HCPCS: Performed by: EMERGENCY MEDICINE

## 2023-11-09 PROCEDURE — 96374 THER/PROPH/DIAG INJ IV PUSH: CPT

## 2023-11-09 PROCEDURE — 99205 PR OFFICE/OUTPT VISIT, NEW, LEVL V, 60-74 MIN: ICD-10-PCS | Mod: S$GLB,,, | Performed by: COLON & RECTAL SURGERY

## 2023-11-09 PROCEDURE — 87040 BLOOD CULTURE FOR BACTERIA: CPT | Performed by: EMERGENCY MEDICINE

## 2023-11-09 PROCEDURE — 1111F DSCHRG MED/CURRENT MED MERGE: CPT | Mod: CPTII,S$GLB,, | Performed by: COLON & RECTAL SURGERY

## 2023-11-09 PROCEDURE — 80053 COMPREHEN METABOLIC PANEL: CPT | Performed by: NURSE PRACTITIONER

## 2023-11-09 PROCEDURE — 87070 CULTURE OTHR SPECIMN AEROBIC: CPT | Performed by: EMERGENCY MEDICINE

## 2023-11-09 PROCEDURE — 1111F PR DISCHARGE MEDS RECONCILED W/ CURRENT OUTPATIENT MED LIST: ICD-10-PCS | Mod: CPTII,S$GLB,, | Performed by: COLON & RECTAL SURGERY

## 2023-11-09 PROCEDURE — 3288F PR FALLS RISK ASSESSMENT DOCUMENTED: ICD-10-PCS | Mod: CPTII,S$GLB,, | Performed by: COLON & RECTAL SURGERY

## 2023-11-09 PROCEDURE — 99999 PR PBB SHADOW E&M-EST. PATIENT-LVL III: CPT | Mod: PBBFAC,,, | Performed by: COLON & RECTAL SURGERY

## 2023-11-09 PROCEDURE — 1101F PT FALLS ASSESS-DOCD LE1/YR: CPT | Mod: CPTII,S$GLB,, | Performed by: COLON & RECTAL SURGERY

## 2023-11-09 PROCEDURE — 1159F PR MEDICATION LIST DOCUMENTED IN MEDICAL RECORD: ICD-10-PCS | Mod: CPTII,S$GLB,, | Performed by: COLON & RECTAL SURGERY

## 2023-11-09 PROCEDURE — 25000003 PHARM REV CODE 250: Performed by: NURSE PRACTITIONER

## 2023-11-09 PROCEDURE — 99999 PR PBB SHADOW E&M-EST. PATIENT-LVL III: ICD-10-PCS | Mod: PBBFAC,,, | Performed by: COLON & RECTAL SURGERY

## 2023-11-09 PROCEDURE — 85025 COMPLETE CBC W/AUTO DIFF WBC: CPT | Performed by: NURSE PRACTITIONER

## 2023-11-09 PROCEDURE — 3075F PR MOST RECENT SYSTOLIC BLOOD PRESS GE 130-139MM HG: ICD-10-PCS | Mod: CPTII,S$GLB,, | Performed by: COLON & RECTAL SURGERY

## 2023-11-09 PROCEDURE — 3078F PR MOST RECENT DIASTOLIC BLOOD PRESSURE < 80 MM HG: ICD-10-PCS | Mod: CPTII,S$GLB,, | Performed by: COLON & RECTAL SURGERY

## 2023-11-09 PROCEDURE — 85610 PROTHROMBIN TIME: CPT | Performed by: NURSE PRACTITIONER

## 2023-11-09 PROCEDURE — 83605 ASSAY OF LACTIC ACID: CPT | Performed by: EMERGENCY MEDICINE

## 2023-11-09 PROCEDURE — 3075F SYST BP GE 130 - 139MM HG: CPT | Mod: CPTII,S$GLB,, | Performed by: COLON & RECTAL SURGERY

## 2023-11-09 PROCEDURE — 3288F FALL RISK ASSESSMENT DOCD: CPT | Mod: CPTII,S$GLB,, | Performed by: COLON & RECTAL SURGERY

## 2023-11-09 PROCEDURE — 1159F MED LIST DOCD IN RCRD: CPT | Mod: CPTII,S$GLB,, | Performed by: COLON & RECTAL SURGERY

## 2023-11-09 PROCEDURE — 1160F RVW MEDS BY RX/DR IN RCRD: CPT | Mod: CPTII,S$GLB,, | Performed by: COLON & RECTAL SURGERY

## 2023-11-09 PROCEDURE — 1160F PR REVIEW ALL MEDS BY PRESCRIBER/CLIN PHARMACIST DOCUMENTED: ICD-10-PCS | Mod: CPTII,S$GLB,, | Performed by: COLON & RECTAL SURGERY

## 2023-11-09 PROCEDURE — 99205 OFFICE O/P NEW HI 60 MIN: CPT | Mod: S$GLB,,, | Performed by: COLON & RECTAL SURGERY

## 2023-11-09 PROCEDURE — 1101F PR PT FALLS ASSESS DOC 0-1 FALLS W/OUT INJ PAST YR: ICD-10-PCS | Mod: CPTII,S$GLB,, | Performed by: COLON & RECTAL SURGERY

## 2023-11-09 PROCEDURE — 3078F DIAST BP <80 MM HG: CPT | Mod: CPTII,S$GLB,, | Performed by: COLON & RECTAL SURGERY

## 2023-11-09 PROCEDURE — 96375 TX/PRO/DX INJ NEW DRUG ADDON: CPT

## 2023-11-09 PROCEDURE — 11000001 HC ACUTE MED/SURG PRIVATE ROOM

## 2023-11-09 PROCEDURE — 99285 EMERGENCY DEPT VISIT HI MDM: CPT | Mod: 25

## 2023-11-09 RX ORDER — ONDANSETRON 2 MG/ML
4 INJECTION INTRAMUSCULAR; INTRAVENOUS EVERY 4 HOURS PRN
Status: DISCONTINUED | OUTPATIENT
Start: 2023-11-09 | End: 2023-11-17 | Stop reason: HOSPADM

## 2023-11-09 RX ORDER — IBUPROFEN 200 MG
16 TABLET ORAL
Status: DISCONTINUED | OUTPATIENT
Start: 2023-11-09 | End: 2023-11-17 | Stop reason: HOSPADM

## 2023-11-09 RX ORDER — GLUCAGON 1 MG
1 KIT INJECTION
Status: DISCONTINUED | OUTPATIENT
Start: 2023-11-09 | End: 2023-11-17 | Stop reason: HOSPADM

## 2023-11-09 RX ORDER — ACETAMINOPHEN 325 MG/1
650 TABLET ORAL EVERY 4 HOURS PRN
Status: DISCONTINUED | OUTPATIENT
Start: 2023-11-09 | End: 2023-11-17 | Stop reason: HOSPADM

## 2023-11-09 RX ORDER — INSULIN ASPART 100 [IU]/ML
0-10 INJECTION, SOLUTION INTRAVENOUS; SUBCUTANEOUS
Status: DISCONTINUED | OUTPATIENT
Start: 2023-11-09 | End: 2023-11-17 | Stop reason: HOSPADM

## 2023-11-09 RX ORDER — TAMSULOSIN HYDROCHLORIDE 0.4 MG/1
0.4 CAPSULE ORAL DAILY
Status: DISCONTINUED | OUTPATIENT
Start: 2023-11-10 | End: 2023-11-17 | Stop reason: HOSPADM

## 2023-11-09 RX ORDER — SODIUM CHLORIDE 0.9 % (FLUSH) 0.9 %
10 SYRINGE (ML) INJECTION
Status: DISCONTINUED | OUTPATIENT
Start: 2023-11-09 | End: 2023-11-17 | Stop reason: HOSPADM

## 2023-11-09 RX ORDER — PROCHLORPERAZINE EDISYLATE 5 MG/ML
5 INJECTION INTRAMUSCULAR; INTRAVENOUS EVERY 6 HOURS PRN
Status: DISCONTINUED | OUTPATIENT
Start: 2023-11-09 | End: 2023-11-17 | Stop reason: HOSPADM

## 2023-11-09 RX ORDER — INSULIN ASPART 100 [IU]/ML
0-5 INJECTION, SOLUTION INTRAVENOUS; SUBCUTANEOUS
Status: DISCONTINUED | OUTPATIENT
Start: 2023-11-09 | End: 2023-11-09

## 2023-11-09 RX ORDER — ACETAMINOPHEN 500 MG
1000 TABLET ORAL EVERY 6 HOURS PRN
Status: DISCONTINUED | OUTPATIENT
Start: 2023-11-09 | End: 2023-11-17 | Stop reason: HOSPADM

## 2023-11-09 RX ORDER — MORPHINE SULFATE 4 MG/ML
2 INJECTION, SOLUTION INTRAMUSCULAR; INTRAVENOUS EVERY 4 HOURS PRN
Status: DISCONTINUED | OUTPATIENT
Start: 2023-11-09 | End: 2023-11-17 | Stop reason: HOSPADM

## 2023-11-09 RX ORDER — METOPROLOL TARTRATE 25 MG/1
25 TABLET, FILM COATED ORAL 2 TIMES DAILY
Status: DISCONTINUED | OUTPATIENT
Start: 2023-11-09 | End: 2023-11-17 | Stop reason: HOSPADM

## 2023-11-09 RX ORDER — IBUPROFEN 200 MG
24 TABLET ORAL
Status: DISCONTINUED | OUTPATIENT
Start: 2023-11-09 | End: 2023-11-17 | Stop reason: HOSPADM

## 2023-11-09 RX ORDER — SODIUM CHLORIDE, SODIUM LACTATE, POTASSIUM CHLORIDE, CALCIUM CHLORIDE 600; 310; 30; 20 MG/100ML; MG/100ML; MG/100ML; MG/100ML
INJECTION, SOLUTION INTRAVENOUS CONTINUOUS
Status: ACTIVE | OUTPATIENT
Start: 2023-11-09 | End: 2023-11-10

## 2023-11-09 RX ORDER — NALOXONE HCL 0.4 MG/ML
0.02 VIAL (ML) INJECTION
Status: DISCONTINUED | OUTPATIENT
Start: 2023-11-09 | End: 2023-11-17 | Stop reason: HOSPADM

## 2023-11-09 RX ORDER — AMLODIPINE BESYLATE 5 MG/1
5 TABLET ORAL DAILY
Status: DISCONTINUED | OUTPATIENT
Start: 2023-11-10 | End: 2023-11-16

## 2023-11-09 RX ORDER — MORPHINE SULFATE 4 MG/ML
4 INJECTION, SOLUTION INTRAMUSCULAR; INTRAVENOUS
Status: COMPLETED | OUTPATIENT
Start: 2023-11-09 | End: 2023-11-09

## 2023-11-09 RX ORDER — ONDANSETRON 2 MG/ML
4 INJECTION INTRAMUSCULAR; INTRAVENOUS
Status: COMPLETED | OUTPATIENT
Start: 2023-11-09 | End: 2023-11-09

## 2023-11-09 RX ORDER — ALBUTEROL SULFATE 90 UG/1
2 AEROSOL, METERED RESPIRATORY (INHALATION) EVERY 6 HOURS PRN
Status: DISCONTINUED | OUTPATIENT
Start: 2023-11-09 | End: 2023-11-17 | Stop reason: HOSPADM

## 2023-11-09 RX ORDER — ATORVASTATIN CALCIUM 10 MG/1
10 TABLET, FILM COATED ORAL NIGHTLY
Status: DISCONTINUED | OUTPATIENT
Start: 2023-11-09 | End: 2023-11-17 | Stop reason: HOSPADM

## 2023-11-09 RX ADMIN — ONDANSETRON 4 MG: 2 INJECTION INTRAMUSCULAR; INTRAVENOUS at 11:11

## 2023-11-09 RX ADMIN — SODIUM CHLORIDE, POTASSIUM CHLORIDE, SODIUM LACTATE AND CALCIUM CHLORIDE: 600; 310; 30; 20 INJECTION, SOLUTION INTRAVENOUS at 05:11

## 2023-11-09 RX ADMIN — INSULIN ASPART 2 UNITS: 100 INJECTION, SOLUTION INTRAVENOUS; SUBCUTANEOUS at 06:11

## 2023-11-09 RX ADMIN — MORPHINE SULFATE 4 MG: 4 INJECTION, SOLUTION INTRAMUSCULAR; INTRAVENOUS at 11:11

## 2023-11-09 RX ADMIN — ATORVASTATIN CALCIUM 10 MG: 10 TABLET, FILM COATED ORAL at 08:11

## 2023-11-09 RX ADMIN — PIPERACILLIN AND TAZOBACTAM 4.5 G: 4; .5 INJECTION, POWDER, FOR SOLUTION INTRAVENOUS at 03:11

## 2023-11-09 RX ADMIN — SODIUM CHLORIDE, POTASSIUM CHLORIDE, SODIUM LACTATE AND CALCIUM CHLORIDE 1000 ML: 600; 310; 30; 20 INJECTION, SOLUTION INTRAVENOUS at 11:11

## 2023-11-09 RX ADMIN — IOPAMIDOL 100 ML: 755 INJECTION, SOLUTION INTRAVENOUS at 01:11

## 2023-11-09 RX ADMIN — ACETAMINOPHEN 1000 MG: 500 TABLET ORAL at 05:11

## 2023-11-09 RX ADMIN — PIPERACILLIN AND TAZOBACTAM 4.5 G: 4; .5 INJECTION, POWDER, LYOPHILIZED, FOR SOLUTION INTRAVENOUS; PARENTERAL at 05:11

## 2023-11-09 RX ADMIN — METOPROLOL TARTRATE 25 MG: 25 TABLET, FILM COATED ORAL at 08:11

## 2023-11-09 NOTE — PROGRESS NOTES
Patient ID: [id number]     Chief Complaint: Hemorrhoids      HPI:     Tonie Tejada is a 83 y.o. female here today for an initial consultation.  She was referred by Dr. Yoav Poon for management of hemorrhoids.  Two weeks ago she went to the emergency room with bright red blood per rectum.  She was admitted to the hospital for further workup.  She was given an enema prior to colonoscopy and felt some discomfort at that time.  She then underwent colonoscopy 10/21/2023 with Dr. Jasper Davis and was found to have multiple diverticula, a 2 mm tubular adenoma in the sigmoid colon, and hypertrophied anal papilla.  Following the colonoscopy she developed swollen, painful hemorrhoids but was not having any rectal bleeding.  Surgery was consulted and recommended conservative management with outpatient follow-up.  Her daughter states that she noticed a foul smell and something hanging from her rectum last night.  She is still having significant pain and has been using preparation H without much relief.  She denies any fevers or chills.    Past Medical History:  has a past medical history of Chronic hepatitis B, DM (diabetes mellitus), HLD (hyperlipidemia), HTN (hypertension), OA (osteoarthritis), and TIA (transient ischemic attack).    Surgical History:  has a past surgical history that includes Hysterectomy;  section; Joint replacement; Esophagogastroduodenoscopy (N/A, 10/20/2023); Colonoscopy (N/A, 10/21/2023); and colonoscopy, with polypectomy using snare (10/21/2023).    Family History: family history is not on file.    Social History:  reports that she has quit smoking. She has never used smokeless tobacco. She reports that she does not currently use alcohol. She reports that she does not use drugs.    Current Outpatient Medications   Medication Instructions    albuterol (PROVENTIL/VENTOLIN HFA) 90 mcg/actuation inhaler albuterol sulfate HFA 90 mcg/actuation aerosol inhaler   Inhale 1 puff as needed by  inhalation route for 17 days.    amLODIPine (NORVASC) 5 MG tablet amlodipine 5 mg tablet   TAKE 1 TABLET BY MOUTH ONCE DAILY    aspirin (ECOTRIN) 81 MG EC tablet Adult Low Dose Aspirin 81 mg tablet,delayed release   Take 1 tablet every day by oral route.    diclofenac sodium (VOLTAREN) 2 g, Topical (Top), Daily    hydrocortisone 1 % cream Topical (Top), 2 times daily    hyoscyamine 0.125 mg, Sublingual, Every 4 hours PRN    metoprolol tartrate (LOPRESSOR) 25 mg, Oral, 2 times daily    omeprazole (PRILOSEC) 40 MG capsule 1 capsule 30 minutes before morning meal    pioglitazone (ACTOS) 30 mg, Oral    simvastatin (ZOCOR) 20 MG tablet simvastatin 20 mg tablet   TAKE 1 TABLET BY MOUTH ONCE DAILY AT BEDTIME    tamsulosin (FLOMAX) 0.4 mg, Oral, Daily    tenofovir alafenamide (VEMLIDY) 25 mg Tab Vemlidy 25 mg tablet  TAKE 1 TABLET BY MOUTH ONCE DAILY       Patient has No Known Allergies.     Patient Care Team:  Marcia Vaughan FNP as PCP - General (Family Medicine)  Rhianna Pierce APRN as Nurse Practitioner (Infectious Diseases)       Subjective:     Review of Systems    12 point review of systems conducted, negative except as stated in the history of present illness. See HPI for details.      Objective:     Visit Vitals  /78   Pulse 81       Physical Exam    General: Alert and oriented, No acute distress.  Head: Normocephalic, Atraumatic.  Eye: Pupils are equal and round, Extraocular movements are intact, Sclera non-icteric.  Ears/Nose/Throat: Normal, Mucosa moist, Clear.  Respiratory: No wheezes, Non-labored respirations, Symmetrical chest wall expansion.  Cardiovascular: Regular rate.  Gastrointestinal: Soft, Non-tender, Non-distended, Normal bowel sounds, No palpable masses.  Rectal:  3 column hemorrhoid disease with external tags.  Right posterior opening in the mucosa with necrotic tissue protruding.  Opening was explored and noted to track under the mucosa.  Integumentary: Warm, Dry, Intact, No  rashes.  Extremities: No edema.  Neurologic: No focal deficits.  Psychiatric: Normal interaction, Coherent speech, Euthymic mood, Appropriate affect.       Labs Reviewed:     Chemistry:  Lab Results   Component Value Date     11/09/2023    K 4.2 11/09/2023    CHLORIDE 106 11/09/2023    BUN 13.3 11/09/2023    CREATININE 1.30 (H) 11/09/2023    EGFRNORACEVR 41 11/09/2023    GLUCOSE 116 (H) 11/09/2023    CALCIUM 8.9 11/09/2023    ALKPHOS 71 11/09/2023    LABPROT 7.3 11/09/2023    ALBUMIN 3.4 11/09/2023    BILIDIR 0.2 11/26/2021    IBILI 0.40 11/26/2021    AST 14 11/09/2023    ALT 10 11/09/2023    MG 2.10 10/21/2023    PHOS 4.3 10/20/2023        Hematology:  Lab Results   Component Value Date    WBC 10.28 11/09/2023    HGB 9.2 (L) 11/09/2023    HCT 29.2 (L) 11/09/2023     11/09/2023         Assessment:       ICD-10-CM ICD-9-CM   1. Gangrene of hemorrhoid  K64.8 455.8    I96         Plan:   I explained the physical exam findings and directed the patient to the ER for admit, further workup with CT scan, IV antibiotics, and possible surgery.      No follow-ups on file. In addition to their scheduled follow up, the patient has also been instructed to follow up on as needed basis.     Future Appointments   Date Time Provider Department Center   3/7/2024 10:10 AM Rhianna Pierce, [facility]        Jamie Garcia MD

## 2023-11-09 NOTE — FIRST PROVIDER EVALUATION
Medical screening examination initiated.  I have conducted a focused provider triage encounter, findings are as follows:    Brief history of present illness:  Patient's family states that patient was sent to the ED for evaluation due to an infected hemorrhoid.     There were no vitals filed for this visit.    Pertinent physical exam:  Awake, alert    Brief workup plan:  Labs    Preliminary workup initiated; this workup will be continued and followed by the physician or advanced practice provider that is assigned to the patient when roomed.

## 2023-11-09 NOTE — H&P
[facility] Hospital Medicine History & Physical Examination       Patient Name: Tonie Tejada  MRN: [id number]  Patient Class: IP- Inpatient   Admission Date: 11/9/2023   Admitting Physician: ABI Service   Length of Stay: 0  Attending Physician: Dr. Lidia Gray  Primary Care Provider: Marcia Vaughan FNP  Face-to-Face encounter date: 11/09/2023  Code Status:Full code  Chief Complaint: Hemorrhoids (Pt reports pov  from Dr. Garcia's office with reports of necrotic hemorrhoids x2 weeks. Sent here by MD to be admitted through the ED. Pt reports taking OTC meds with no relief. Denies bleeding, but does report frequent bowel movements. )        Patient information was obtained from patient, patient's family, past medical records and ER records.     MD Addendum - 82 yo female with PMHx as mentioned below was sent to the ED after pt was seen by  Dr. Garcia in the office today for further evaluation of nerotic hemorrhoids. History is significant for recent admission in 10/2023 for hematochezia and pt underwent colonoscopy on 10/21/23 which revealed multiple diverticula, sigmoid colon polyp and hypertrophied anal papilla. Following colonoscopy pt developed a painful hemorrhoid and was seen by General surgery suggested conservative management. Pt reports in the last few days hemorrhoid became more painful and now it is hanging from rectum with active foul smell brownish discharge. Pt denies associated fever , chills, night sweats or other symptoms. On arrival she was afebrile, mod hypertensive with normal HR and RR. O2 sat 99% on RA. Labs showed normal WBC, Hgb 9.2, Cr 1.3, LA 1.0. CT pelvis showed inflammatory changes surround the rectum and anus with gas and minimal fluid within the right gluteal cleft measuring 3.6 cm extending somewhat superiorly on the left.  On exam, swelling, erythema with active pus discharge noted from anus. Blood cultures were obtained and pt initiated on Zosyn.  Dr. Garcia is planning for EUA in        HISTORY OF PRESENT ILLNESS:   Tonie Tejada is a 83 y.o. female who PMH includes chronic hepatitis-B currently on treatment, DM type 2, HTN, HLD, osteoarthritis, CVA/TIA;, CKD stage III;  presents to the ED at [facility] on 2023 sent from surgeon Dr. Garcia office for further evaluation of nerotic hemorrhoids over the past 2 weeks.  Patient has been having pain and discomfort over the past several days which she presented to Dr. Garcia for further evaluation which she was instructed to come to the ED. no reported chest pain, shortness O breath, nausea, vomiting, diarrhea fever, chills, any hematemesis, bright red bleeding per rectum, hematochezia, or any black tarry stools, no reports of any melena.  Patient does report increased in frequency of bowel movements and intermittent episodes of fecal incontinence.  Lab work reviewed demonstrated  H&H 9.2/29.2, creatinine 1.30, glucose 116, other indices unremarkable.  CTA of the abdomen pelvis with contrast impression reviewed demonstrating inflammatory changes around the rectum and anus with gas and minimal fluid noted within the right gluteal cleft measuring 3.6 cm which extends somewhat superiorly on the left.  Initial vital signs  8/76 pulse 73 respirations 16 temperature 98.2° F O2 saturation 99% on room air.  Blood cultures were collected x2 sets.  Patient was started on IV Zosyn therapy.  Patient is admitted to hospital medicine services for further management. Dr. Garcia consulted.     PAST MEDICAL HISTORY:     Past Medical History:   Diagnosis Date    Chronic hepatitis B     DM (diabetes mellitus)     HLD (hyperlipidemia)     HTN (hypertension)     OA (osteoarthritis)     TIA (transient ischemic attack)        PAST SURGICAL HISTORY:     Past Surgical History:   Procedure Laterality Date     SECTION      COLONOSCOPY N/A 10/21/2023    Procedure: COLONOSCOPY;  Surgeon: Jasper Davis MD;   Location: [facility] OR;  Service: Gastroenterology;  Laterality: N/A;    COLONOSCOPY, WITH POLYPECTOMY USING SNARE  10/21/2023    Procedure: COLONOSCOPY, WITH POLYPECTOMY USING SNARE;  Surgeon: Jasper Davis MD;  Location: [facility] OR;  Service: Gastroenterology;;    ESOPHAGOGASTRODUODENOSCOPY N/A 10/20/2023    Procedure: EGD;  Surgeon: Cody Urbina MD;  Location: [facility] ENDOSCOPY;  Service: Gastroenterology;  Laterality: N/A;    HYSTERECTOMY      JOINT REPLACEMENT         ALLERGIES:   Patient has no known allergies.    FAMILY HISTORY:   Reviewed and negative    SOCIAL HISTORY:   No reports of recent tobacco use   No reports of illicit drug use   No reports of alcohol use   Lives with family     HOME MEDICATIONS:   As documented  Prior to Admission medications    Medication Sig Start Date End Date Taking? Authorizing Provider   albuterol (PROVENTIL/VENTOLIN HFA) 90 mcg/actuation inhaler albuterol sulfate HFA 90 mcg/actuation aerosol inhaler   Inhale 1 puff as needed by inhalation route for 17 days.    Provider, Historical   amLODIPine (NORVASC) 5 MG tablet amlodipine 5 mg tablet   TAKE 1 TABLET BY MOUTH ONCE DAILY    Provider, Historical   aspirin (ECOTRIN) 81 MG EC tablet Adult Low Dose Aspirin 81 mg tablet,delayed release   Take 1 tablet every day by oral route.    Provider, Historical   diclofenac sodium (VOLTAREN) 1 % Gel Apply 2 g topically once daily.    Provider, Historical   hydrocortisone 1 % cream Apply topically 2 (two) times daily. for 5 days 10/26/23 10/31/23  Diana Warren MD   hyoscyamine 0.125 mg Subl Place 1 tablet (0.125 mg total) under the tongue every 4 (four) hours as needed (BLADDER SPASM). 10/26/23 10/30/23  Diana Warren MD   metoprolol tartrate (LOPRESSOR) 25 MG tablet Take 25 mg by mouth 2 (two) times daily. 8/28/23   Provider, Historical   omeprazole (PRILOSEC) 40 MG capsule 1 capsule 30 minutes before morning meal    Provider, Historical   pioglitazone (ACTOS) 30 MG tablet  Take 30 mg by mouth. 8/28/23   Provider, Historical   simvastatin (ZOCOR) 20 MG tablet simvastatin 20 mg tablet   TAKE 1 TABLET BY MOUTH ONCE DAILY AT BEDTIME    Provider, Historical   tamsulosin (FLOMAX) 0.4 mg Cap Take 1 capsule (0.4 mg total) by mouth once daily. 10/26/23 11/25/23  Diana Warren MD   tenofovir alafenamide (VEMLIDY) 25 mg Tab Vemlidy 25 mg tablet  TAKE 1 TABLET BY MOUTH ONCE DAILY 9/6/23   Rhianna Pierce, APRN       REVIEW OF SYSTEMS:   Except as documented, all other systems reviewed and negative     PHYSICAL EXAM:     VITAL SIGNS: 24 HRS MIN & MAX LAST   Temp  Min: 98.2 °F (36.8 °C)  Max: 98.2 °F (36.8 °C) 98.2 °F (36.8 °C)   BP  Min: 131/78  Max: 168/80 (!) 168/80   Pulse  Min: 73  Max: 83  83   Resp  Min: 16  Max: 22 16   SpO2  Min: 97 %  Max: 99 % 97 %       General appearance:  Elderly female chronically ill-appearing; nontoxic reports of rectal pain   HENT: Atraumatic head. Moist mucous membranes of oral cavity.  Eyes: PERRL  Lungs: Clear to auscultation bilaterally. No wheezing present.   Heart: Regular rate and rhythm. S1 and S2 present cap refill brisk  Abdomen: Soft, non-distended, non-tender. No rebound tenderness/guarding. Bowel sounds are normal.   Extremities: No cyanosis, clubbing, atraumatic  Skin: warm and dry  Neuro:Oriented x 3, no focal deficits  Psych/mental status: Appropriate mood and affect., cooperative    LABS AND IMAGING:     Recent Labs   Lab 11/09/23  1138   WBC 10.28   RBC 3.28*   HGB 9.2*   HCT 29.2*   MCV 89.0   MCH 28.0   MCHC 31.5*   RDW 13.7      MPV 10.3       Recent Labs   Lab 11/09/23  1138      K 4.2   CO2 24   BUN 13.3   CREATININE 1.30*   CALCIUM 8.9   ALBUMIN 3.4   ALKPHOS 71   ALT 10   AST 14   BILITOT 0.7       Microbiology Results (last 7 days)       Procedure Component Value Units Date/Time    Blood culture #1 **CANNOT BE ORDERED STAT** [[id number]] Collected: 11/09/23 1138    Order Status: Resulted Specimen: Blood Updated:  11/09/23 1143    Blood culture #2 **CANNOT BE ORDERED STAT** [[id number]] Collected: 11/09/23 1138    Order Status: Resulted Specimen: Blood Updated: 11/09/23 1143    Wound Culture [[id number]] Collected: 11/09/23 1129    Order Status: Sent Specimen: Drainage from Anus Updated: 11/09/23 1129             CT Pelvis With IV Contrast  Narrative: CLINICAL HISTORY:  Trauma.    TECHNIQUE:  Maxillofacial CT was performed without  contrast. There are sagittal and coronal reconstructed images available for review.    Automatic exposure control was utilized to reduce the patient's radiation dose.    DLP = 526    COMPARISON:  No prior imaging available for comparison.    FINDINGS:  Inflammatory changes surround the rectum and anus with gas and minimal fluid noted within the right gluteal cleft measuring 3.6 cm.  This extends somewhat superiorly on the left (series 2, image 72).  Findings concerning for perirectal abscess or possible fistula.    Diverticulosis without evidence of diverticulitis.  Anti dependent air is noted within the bladder.  Correlate for recent catheterization.  No acute osseous.  Impression: Inflammatory changes surround the rectum and anus with gas and minimal fluid noted within the right gluteal cleft measuring 3.6 cm. This extends somewhat superiorly on the left (series 2, image 72).    Electronically signed by: Rory Zambrano  Date:    11/09/2023  Time:    13:38        ASSESSMENT & PLAN:   ASSESSMENT:  Acute hemorrhoid necrosis- with suspected gas and inflammatory changed per imaging- POA  Acute rectal pain- POA  Acute on chronic kidney disease- POA  Anemia- chronic disease- POA  DM type II with hyperglycemia- POA  Weakness- POA    History of chronic hepatitis-B on Vemlidy, HLD, TIA on aspirin, osteoarthritis    PLAN:  Dr. Garcia consulted appreciate assistance and recommendations   Follow cultures and sensitivities  Continue with IV antibiotic therapy   IV hydration  Accu-Cheks and sliding scale  coverage   Resume home medication as deemed necessary once able to take p.o.  Repeat lab work in a.m.       VTE Prophylaxis:  SCD for DVT prophylaxis and will be advised to be as mobile as possible and sit in a chair as tolerated    Patient condition:  Stable  __________________________________________________________________________  INPATIENT LIST OF MEDICATIONS     Scheduled Meds:   piperacillin-tazobactam (Zosyn) IV (PEDS and ADULTS) (extended infusion is not appropriate)  4.5 g Intravenous ED 1 Time     Continuous Infusions:  PRN Meds:.acetaminophen, acetaminophen, insulin aspart U-100, naloxone, ondansetron, prochlorperazine, sodium chloride 0.9%      IEmy FNP have reviewed and discussed the case with Dr. Lidia Gray. Please see the following addendum for further assessment and plan from there attending KUSHAL Brush   11/09/2023

## 2023-11-09 NOTE — ED PROVIDER NOTES
Encounter Date: 2023    SCRIBE #1 NOTE: I, Ruiz To, am scribing for, and in the presence of,  Beryl Barber MD. I have scribed the following portions of the note - Other sections scribed: HPI, ROS, PE, MDM.       History     Chief Complaint   Patient presents with    Hemorrhoids     Pt reports pov  from Dr. Garcia's office with reports of necrotic hemorrhoids x2 weeks. Sent here by MD to be admitted through the ED. Pt reports taking OTC meds with no relief. Denies bleeding, but does report frequent bowel movements.      An 82 y/o female with a history of diabetes, hypertension, hyperlipidemia, hepatitis B, and TIA presents to [facility] ED from Dr. Garcia's office upon his recommendation for worsening necrotic hemorrhoids for the past 2 weeks. Patient has had associated symptoms of weakness and fecal incontinence. Unknown fever.     The history is provided by the patient and a relative.     Review of patient's allergies indicates:  No Known Allergies  Past Medical History:   Diagnosis Date    Chronic hepatitis B     DM (diabetes mellitus)     HLD (hyperlipidemia)     HTN (hypertension)     OA (osteoarthritis)     TIA (transient ischemic attack)      Past Surgical History:   Procedure Laterality Date     SECTION      COLONOSCOPY N/A 10/21/2023    Procedure: COLONOSCOPY;  Surgeon: Jasper Davis MD;  Location: [facility];  Service: Gastroenterology;  Laterality: N/A;    COLONOSCOPY, WITH POLYPECTOMY USING SNARE  10/21/2023    Procedure: COLONOSCOPY, WITH POLYPECTOMY USING SNARE;  Surgeon: Jasper Davis MD;  Location: [facility] OR;  Service: Gastroenterology;;    ESOPHAGOGASTRODUODENOSCOPY N/A 10/20/2023    Procedure: EGD;  Surgeon: Cody Urbina MD;  Location: [facility] ENDOSCOPY;  Service: Gastroenterology;  Laterality: N/A;    HYSTERECTOMY      JOINT REPLACEMENT       History reviewed. No pertinent family history.  Social History     Tobacco Use    Smoking status: Former     Smokeless tobacco: Never    Tobacco comments:     states a pack would last a week, quit 50 yrs ago   Substance Use Topics    Alcohol use: Not Currently    Drug use: Never     Review of Systems   Constitutional:  Negative for chills, diaphoresis and fever.   HENT:  Negative for congestion, ear pain, sinus pain and sore throat.    Eyes:  Negative for pain, discharge and visual disturbance.   Respiratory:  Negative for cough, shortness of breath, wheezing and stridor.    Cardiovascular:  Negative for chest pain and palpitations.   Gastrointestinal:  Negative for abdominal pain, constipation, diarrhea, nausea, rectal pain and vomiting.   Genitourinary:  Negative for dysuria and hematuria.   Musculoskeletal:  Negative for back pain and myalgias.   Skin:  Negative for rash.   Neurological:  Positive for weakness. Negative for dizziness, syncope, numbness and headaches.   Hematological: Negative.    Psychiatric/Behavioral: Negative.     All other systems reviewed and are negative.      Physical Exam     Initial Vitals [11/09/23 1037]   BP Pulse Resp Temp SpO2   (!) 158/76 73 16 98.2 °F (36.8 °C) 99 %      MAP       --         Physical Exam    Nursing note and vitals reviewed.  Constitutional: She appears well-developed and well-nourished. She is not diaphoretic. No distress.   HENT:   Head: Normocephalic and atraumatic.   Nose: Nose normal.   Mouth/Throat: Oropharynx is clear and moist.   Eyes: Conjunctivae and EOM are normal. Pupils are equal, round, and reactive to light.   Neck: Trachea normal. Neck supple.   Normal range of motion.  Cardiovascular:  Normal rate, regular rhythm, normal heart sounds and intact distal pulses.           No murmur heard.  Pulmonary/Chest: Breath sounds normal. No respiratory distress. She has no wheezes. She has no rhonchi. She has no rales. She exhibits no tenderness.   Abdominal: Abdomen is soft. Bowel sounds are normal. She exhibits no distension and no mass. There is no abdominal  tenderness. There is no rebound and no guarding.   Genitourinary:    Genitourinary Comments: External hemorrhoids that are necrotic with drainage.      Musculoskeletal:         General: No tenderness or edema. Normal range of motion.      Cervical back: Normal range of motion and neck supple.      Lumbar back: Normal. Normal range of motion.     Neurological: She is alert and oriented to person, place, and time. She has normal strength. No cranial nerve deficit or sensory deficit.   Generalized weakness.    Skin: Skin is warm and dry. Capillary refill takes less than 2 seconds. No abscess noted. No erythema. No pallor.   Psychiatric: She has a normal mood and affect. Her behavior is normal. Judgment and thought content normal.         ED Course   Procedures  Labs Reviewed   COMPREHENSIVE METABOLIC PANEL - Abnormal; Notable for the following components:       Result Value    Glucose Level 116 (*)     Creatinine 1.30 (*)     Globulin 3.9 (*)     Albumin/Globulin Ratio 0.9 (*)     All other components within normal limits   PROTIME-INR - Abnormal; Notable for the following components:    PT 14.8 (*)     All other components within normal limits   CBC WITH DIFFERENTIAL - Abnormal; Notable for the following components:    RBC 3.28 (*)     Hgb 9.2 (*)     Hct 29.2 (*)     MCHC 31.5 (*)     IG# 0.06 (*)     All other components within normal limits   LACTIC ACID, PLASMA - Normal   BLOOD CULTURE [facility]   BLOOD CULTURE [facility]   WOUND CULTURE ([facility])   CBC W/ AUTO DIFFERENTIAL    Narrative:     The following orders were created for panel order CBC Auto Differential.  Procedure                               Abnormality         Status                     ---------                               -----------         ------                     CBC with Differential[[id number]]       Abnormal            Final result                 Please view results for these tests on the individual orders.   URINALYSIS, REFLEX TO URINE CULTURE           Imaging Results              CT Pelvis With IV Contrast (Final result)  Result time 11/09/23 13:38:20      Final result by Rory Zambrano MD (11/09/23 13:38:20)                   Impression:      Inflammatory changes surround the rectum and anus with gas and minimal fluid noted within the right gluteal cleft measuring 3.6 cm. This extends somewhat superiorly on the left (series 2, image 72).      Electronically signed by: Rory Zambrano  Date:    11/09/2023  Time:    13:38               Narrative:    CLINICAL HISTORY:  Trauma.    TECHNIQUE:  Maxillofacial CT was performed without  contrast. There are sagittal and coronal reconstructed images available for review.    Automatic exposure control was utilized to reduce the patient's radiation dose.    DLP = 526    COMPARISON:  No prior imaging available for comparison.    FINDINGS:  Inflammatory changes surround the rectum and anus with gas and minimal fluid noted within the right gluteal cleft measuring 3.6 cm.  This extends somewhat superiorly on the left (series 2, image 72).  Findings concerning for perirectal abscess or possible fistula.    Diverticulosis without evidence of diverticulitis.  Anti dependent air is noted within the bladder.  Correlate for recent catheterization.  No acute osseous.                                       Medications   piperacillin-tazobactam (ZOSYN) 4.5 g in dextrose 5 % in water (D5W) 100 mL IVPB (MB+) (has no administration in time range)   lactated ringers bolus 1,000 mL (1,000 mLs Intravenous New Bag 11/9/23 1118)   morphine injection 4 mg (4 mg Intravenous Given 11/9/23 1118)   ondansetron injection 4 mg (4 mg Intravenous Given 11/9/23 1118)   iopamidoL (ISOVUE-370) injection 100 mL (100 mLs Intravenous Given 11/9/23 1333)     Medical Decision Making  Differential diagnosis includes but is not limited to infected hemorrhoids, necrotic hemorrhoids, and sepsis. \  Cbc, cmp, lactic, cultures, ct pelvis with contrast ordered  and reviewed  Albs unremarkable  Ct with s/s necrotic hemorrhoid  Discussed with colorectal surgery per ed course, iv analgesia, iv abx, admit    Problems Addressed:  Gangrene of hemorrhoid: acute illness or injury that poses a threat to life or bodily functions    Amount and/or Complexity of Data Reviewed  Independent Historian: caregiver  External Data Reviewed: notes.  Labs: ordered.  Radiology: ordered.    Risk  OTC drugs.  Prescription drug management.  Parenteral controlled substances.  Decision regarding hospitalization.            Scribe Attestation:   Scribe #1: I performed the above scribed service and the documentation accurately describes the services I performed. I attest to the accuracy of the note.  Comments: Attending:   Physician Attestation Statement for Scribe #1: IBeryl MD, personally performed the services described in this documentation. All medical record entries made by the scribe were at my direction and in my presence.  I have reviewed the chart and agree that the record reflects my personal performance and is accurate and complete.        Attending Attestation:           Physician Attestation for Scribe:  Physician Attestation Statement for Scribe #1: IBeryl MD, reviewed documentation, as scribed by Ruiz To in my presence, and it is both accurate and complete.             ED Course as of 11/09/23 1413   Thu Nov 09, 2023   1058 Plan to admit to hospitalist and he will plan for likely surgery [BS]   1117 Dr. Garcia requests CT pelvis with IV contrast and Zosyn [BS]      ED Course User Index  [BS] Beryl Barber MD                 Medical Decision Making:   History:   Old Medical Records: I decided to obtain old medical records.  Old Records Summarized: records from clinic visits.  Initial Assessment:   See hpi  Clinical Tests:   Lab Tests: Ordered and Reviewed  Radiological Study: Ordered and Reviewed  Other:   I have discussed this case with  another health care provider.      Clinical Impression:   Final diagnoses:  [K64.8, I96] Gangrene of hemorrhoid        ED Disposition Condition    Admit                 Beryl Barber MD  11/09/23 1395

## 2023-11-10 ENCOUNTER — ANESTHESIA (OUTPATIENT)
Dept: SURGERY | Facility: HOSPITAL | Age: 83
DRG: 345 | End: 2023-11-10
Payer: MEDICARE

## 2023-11-10 ENCOUNTER — ANESTHESIA EVENT (OUTPATIENT)
Dept: SURGERY | Facility: HOSPITAL | Age: 83
DRG: 345 | End: 2023-11-10
Payer: MEDICARE

## 2023-11-10 LAB
ALBUMIN SERPL-MCNC: 3.1 G/DL (ref 3.4–4.8)
ALBUMIN/GLOB SERPL: 0.8 RATIO (ref 1.1–2)
ALP SERPL-CCNC: 61 UNIT/L (ref 40–150)
ALT SERPL-CCNC: 8 UNIT/L (ref 0–55)
AST SERPL-CCNC: 14 UNIT/L (ref 5–34)
BASOPHILS # BLD AUTO: 0.05 X10(3)/MCL
BASOPHILS NFR BLD AUTO: 0.7 %
BILIRUB SERPL-MCNC: 0.9 MG/DL
BUN SERPL-MCNC: 11.9 MG/DL (ref 9.8–20.1)
CALCIUM SERPL-MCNC: 9.1 MG/DL (ref 8.4–10.2)
CHLORIDE SERPL-SCNC: 105 MMOL/L (ref 98–107)
CO2 SERPL-SCNC: 25 MMOL/L (ref 23–31)
CREAT SERPL-MCNC: 1.45 MG/DL (ref 0.55–1.02)
EOSINOPHIL # BLD AUTO: 0.14 X10(3)/MCL (ref 0–0.9)
EOSINOPHIL NFR BLD AUTO: 1.9 %
ERYTHROCYTE [DISTWIDTH] IN BLOOD BY AUTOMATED COUNT: 13.9 % (ref 11.5–17)
GFR SERPLBLD CREATININE-BSD FMLA CKD-EPI: 36 MLS/MIN/1.73/M2
GLOBULIN SER-MCNC: 3.8 GM/DL (ref 2.4–3.5)
GLUCOSE SERPL-MCNC: 113 MG/DL (ref 70–110)
GLUCOSE SERPL-MCNC: 113 MG/DL (ref 82–115)
HCT VFR BLD AUTO: 28.7 % (ref 37–47)
HGB BLD-MCNC: 9 G/DL (ref 12–16)
IMM GRANULOCYTES # BLD AUTO: 0.04 X10(3)/MCL (ref 0–0.04)
IMM GRANULOCYTES NFR BLD AUTO: 0.6 %
LYMPHOCYTES # BLD AUTO: 1.39 X10(3)/MCL (ref 0.6–4.6)
LYMPHOCYTES NFR BLD AUTO: 19.3 %
MCH RBC QN AUTO: 28.5 PG (ref 27–31)
MCHC RBC AUTO-ENTMCNC: 31.4 G/DL (ref 33–36)
MCV RBC AUTO: 90.8 FL (ref 80–94)
MONOCYTES # BLD AUTO: 0.9 X10(3)/MCL (ref 0.1–1.3)
MONOCYTES NFR BLD AUTO: 12.5 %
NEUTROPHILS # BLD AUTO: 4.7 X10(3)/MCL (ref 2.1–9.2)
NEUTROPHILS NFR BLD AUTO: 65 %
NRBC BLD AUTO-RTO: 0 %
PLATELET # BLD AUTO: 323 X10(3)/MCL (ref 130–400)
PMV BLD AUTO: 10.9 FL (ref 7.4–10.4)
POCT GLUCOSE: 100 MG/DL (ref 70–110)
POCT GLUCOSE: 126 MG/DL (ref 70–110)
POCT GLUCOSE: 225 MG/DL (ref 70–110)
POTASSIUM SERPL-SCNC: 4.4 MMOL/L (ref 3.5–5.1)
PROT SERPL-MCNC: 6.9 GM/DL (ref 5.8–7.6)
RBC # BLD AUTO: 3.16 X10(6)/MCL (ref 4.2–5.4)
SODIUM SERPL-SCNC: 138 MMOL/L (ref 136–145)
WBC # SPEC AUTO: 7.22 X10(3)/MCL (ref 4.5–11.5)

## 2023-11-10 PROCEDURE — 71000033 HC RECOVERY, INTIAL HOUR: Performed by: COLON & RECTAL SURGERY

## 2023-11-10 PROCEDURE — 46260 PR HEMORRHOIDECTOMY,INT/EXT, 2+ COLUMNS/GROUPS: ICD-10-PCS | Mod: ,,, | Performed by: COLON & RECTAL SURGERY

## 2023-11-10 PROCEDURE — 85025 COMPLETE CBC W/AUTO DIFF WBC: CPT | Performed by: NURSE PRACTITIONER

## 2023-11-10 PROCEDURE — 63600175 PHARM REV CODE 636 W HCPCS: Performed by: ANESTHESIOLOGY

## 2023-11-10 PROCEDURE — 63600175 PHARM REV CODE 636 W HCPCS: Performed by: COLON & RECTAL SURGERY

## 2023-11-10 PROCEDURE — 80053 COMPREHEN METABOLIC PANEL: CPT | Performed by: NURSE PRACTITIONER

## 2023-11-10 PROCEDURE — D9220A PRA ANESTHESIA: Mod: ANES,,, | Performed by: ANESTHESIOLOGY

## 2023-11-10 PROCEDURE — 25000003 PHARM REV CODE 250: Performed by: COLON & RECTAL SURGERY

## 2023-11-10 PROCEDURE — 36000705 HC OR TIME LEV I EA ADD 15 MIN: Performed by: COLON & RECTAL SURGERY

## 2023-11-10 PROCEDURE — 11000001 HC ACUTE MED/SURG PRIVATE ROOM

## 2023-11-10 PROCEDURE — 25000003 PHARM REV CODE 250: Performed by: NURSE ANESTHETIST, CERTIFIED REGISTERED

## 2023-11-10 PROCEDURE — D9220A PRA ANESTHESIA: ICD-10-PCS | Mod: ANES,,, | Performed by: ANESTHESIOLOGY

## 2023-11-10 PROCEDURE — 27000221 HC OXYGEN, UP TO 24 HOURS

## 2023-11-10 PROCEDURE — 63600175 PHARM REV CODE 636 W HCPCS: Performed by: NURSE PRACTITIONER

## 2023-11-10 PROCEDURE — 82962 GLUCOSE BLOOD TEST: CPT | Performed by: COLON & RECTAL SURGERY

## 2023-11-10 PROCEDURE — D9220A PRA ANESTHESIA: ICD-10-PCS | Mod: CRNA,,, | Performed by: NURSE ANESTHETIST, CERTIFIED REGISTERED

## 2023-11-10 PROCEDURE — 63600175 PHARM REV CODE 636 W HCPCS: Performed by: NURSE ANESTHETIST, CERTIFIED REGISTERED

## 2023-11-10 PROCEDURE — 25000003 PHARM REV CODE 250: Performed by: INTERNAL MEDICINE

## 2023-11-10 PROCEDURE — 63600175 PHARM REV CODE 636 W HCPCS: Performed by: INTERNAL MEDICINE

## 2023-11-10 PROCEDURE — 37000008 HC ANESTHESIA 1ST 15 MINUTES: Performed by: COLON & RECTAL SURGERY

## 2023-11-10 PROCEDURE — D9220A PRA ANESTHESIA: Mod: CRNA,,, | Performed by: NURSE ANESTHETIST, CERTIFIED REGISTERED

## 2023-11-10 PROCEDURE — 36000704 HC OR TIME LEV I 1ST 15 MIN: Performed by: COLON & RECTAL SURGERY

## 2023-11-10 PROCEDURE — 37000009 HC ANESTHESIA EA ADD 15 MINS: Performed by: COLON & RECTAL SURGERY

## 2023-11-10 PROCEDURE — 88304 TISSUE EXAM BY PATHOLOGIST: CPT | Performed by: COLON & RECTAL SURGERY

## 2023-11-10 PROCEDURE — 46260 REMOVE IN/EX HEM GROUPS 2+: CPT | Mod: ,,, | Performed by: COLON & RECTAL SURGERY

## 2023-11-10 PROCEDURE — 36000707: Performed by: COLON & RECTAL SURGERY

## 2023-11-10 PROCEDURE — 36000706: Performed by: COLON & RECTAL SURGERY

## 2023-11-10 RX ORDER — ONDANSETRON 2 MG/ML
INJECTION INTRAMUSCULAR; INTRAVENOUS
Status: DISCONTINUED | OUTPATIENT
Start: 2023-11-10 | End: 2023-11-10

## 2023-11-10 RX ORDER — DEXAMETHASONE SODIUM PHOSPHATE 4 MG/ML
INJECTION, SOLUTION INTRA-ARTICULAR; INTRALESIONAL; INTRAMUSCULAR; INTRAVENOUS; SOFT TISSUE
Status: DISCONTINUED | OUTPATIENT
Start: 2023-11-10 | End: 2023-11-10

## 2023-11-10 RX ORDER — ONDANSETRON 2 MG/ML
4 INJECTION INTRAMUSCULAR; INTRAVENOUS ONCE
Status: COMPLETED | OUTPATIENT
Start: 2023-11-10 | End: 2023-11-10

## 2023-11-10 RX ORDER — LIDOCAINE HYDROCHLORIDE 20 MG/ML
INJECTION, SOLUTION EPIDURAL; INFILTRATION; INTRACAUDAL; PERINEURAL
Status: DISCONTINUED | OUTPATIENT
Start: 2023-11-10 | End: 2023-11-10

## 2023-11-10 RX ORDER — FENTANYL CITRATE 50 UG/ML
INJECTION, SOLUTION INTRAMUSCULAR; INTRAVENOUS
Status: DISCONTINUED | OUTPATIENT
Start: 2023-11-10 | End: 2023-11-10

## 2023-11-10 RX ORDER — HYDROCODONE BITARTRATE AND ACETAMINOPHEN 7.5; 325 MG/1; MG/1
1 TABLET ORAL EVERY 4 HOURS PRN
Status: DISCONTINUED | OUTPATIENT
Start: 2023-11-10 | End: 2023-11-16

## 2023-11-10 RX ORDER — LIDOCAINE HYDROCHLORIDE AND EPINEPHRINE 10; 10 MG/ML; UG/ML
INJECTION, SOLUTION INFILTRATION; PERINEURAL
Status: DISCONTINUED | OUTPATIENT
Start: 2023-11-10 | End: 2023-11-10 | Stop reason: HOSPADM

## 2023-11-10 RX ORDER — PROPOFOL 10 MG/ML
VIAL (ML) INTRAVENOUS
Status: DISCONTINUED | OUTPATIENT
Start: 2023-11-10 | End: 2023-11-10

## 2023-11-10 RX ORDER — ROCURONIUM BROMIDE 10 MG/ML
INJECTION, SOLUTION INTRAVENOUS
Status: DISCONTINUED | OUTPATIENT
Start: 2023-11-10 | End: 2023-11-10

## 2023-11-10 RX ORDER — POLYETHYLENE GLYCOL 3350 17 G/17G
17 POWDER, FOR SOLUTION ORAL DAILY
Status: DISCONTINUED | OUTPATIENT
Start: 2023-11-11 | End: 2023-11-14

## 2023-11-10 RX ADMIN — SUGAMMADEX 200 MG: 100 INJECTION, SOLUTION INTRAVENOUS at 12:11

## 2023-11-10 RX ADMIN — LIDOCAINE HYDROCHLORIDE 60 ML: 20 INJECTION, SOLUTION EPIDURAL; INFILTRATION; INTRACAUDAL; PERINEURAL at 11:11

## 2023-11-10 RX ADMIN — ONDANSETRON 4 MG: 2 INJECTION INTRAMUSCULAR; INTRAVENOUS at 05:11

## 2023-11-10 RX ADMIN — ROCURONIUM BROMIDE 40 MG: 10 SOLUTION INTRAVENOUS at 11:11

## 2023-11-10 RX ADMIN — PIPERACILLIN AND TAZOBACTAM 4.5 G: 4; .5 INJECTION, POWDER, LYOPHILIZED, FOR SOLUTION INTRAVENOUS; PARENTERAL at 01:11

## 2023-11-10 RX ADMIN — FENTANYL CITRATE 50 MCG: 50 INJECTION, SOLUTION INTRAMUSCULAR; INTRAVENOUS at 11:11

## 2023-11-10 RX ADMIN — INSULIN ASPART 2 UNITS: 100 INJECTION, SOLUTION INTRAVENOUS; SUBCUTANEOUS at 11:11

## 2023-11-10 RX ADMIN — HYDROCODONE BITARTRATE AND ACETAMINOPHEN 1 TABLET: 7.5; 325 TABLET ORAL at 02:11

## 2023-11-10 RX ADMIN — METOPROLOL TARTRATE 25 MG: 25 TABLET, FILM COATED ORAL at 09:11

## 2023-11-10 RX ADMIN — ATORVASTATIN CALCIUM 10 MG: 10 TABLET, FILM COATED ORAL at 09:11

## 2023-11-10 RX ADMIN — HYDROCODONE BITARTRATE AND ACETAMINOPHEN 1 TABLET: 7.5; 325 TABLET ORAL at 10:11

## 2023-11-10 RX ADMIN — AMLODIPINE BESYLATE 5 MG: 5 TABLET ORAL at 08:11

## 2023-11-10 RX ADMIN — FENTANYL CITRATE 50 MCG: 50 INJECTION, SOLUTION INTRAMUSCULAR; INTRAVENOUS at 12:11

## 2023-11-10 RX ADMIN — ONDANSETRON 4 MG: 2 INJECTION INTRAMUSCULAR; INTRAVENOUS at 11:11

## 2023-11-10 RX ADMIN — PIPERACILLIN AND TAZOBACTAM 4.5 G: 4; .5 INJECTION, POWDER, LYOPHILIZED, FOR SOLUTION INTRAVENOUS; PARENTERAL at 08:11

## 2023-11-10 RX ADMIN — PROPOFOL 120 MG: 10 INJECTION, EMULSION INTRAVENOUS at 11:11

## 2023-11-10 RX ADMIN — DEXAMETHASONE SODIUM PHOSPHATE 4 MG: 4 INJECTION, SOLUTION INTRA-ARTICULAR; INTRALESIONAL; INTRAMUSCULAR; INTRAVENOUS; SOFT TISSUE at 12:11

## 2023-11-10 RX ADMIN — MORPHINE SULFATE 2 MG: 4 INJECTION, SOLUTION INTRAMUSCULAR; INTRAVENOUS at 08:11

## 2023-11-10 RX ADMIN — METOPROLOL TARTRATE 25 MG: 25 TABLET, FILM COATED ORAL at 08:11

## 2023-11-10 RX ADMIN — ONDANSETRON 4 MG: 2 INJECTION INTRAMUSCULAR; INTRAVENOUS at 12:11

## 2023-11-10 RX ADMIN — TAMSULOSIN HYDROCHLORIDE 0.4 MG: 0.4 CAPSULE ORAL at 08:11

## 2023-11-10 RX ADMIN — PIPERACILLIN AND TAZOBACTAM 4.5 G: 4; .5 INJECTION, POWDER, LYOPHILIZED, FOR SOLUTION INTRAVENOUS; PARENTERAL at 05:11

## 2023-11-10 NOTE — ANESTHESIA POSTPROCEDURE EVALUATION
Anesthesia Post Evaluation    Patient: Tonie Tejada    Procedure(s) Performed: Procedure(s) (LRB):  Exam under anesthesia (N/A)    Final Anesthesia Type: general      Patient location during evaluation: PACU  Patient participation: Yes- Able to Participate  Level of consciousness: awake and alert, awake and oriented  Post-procedure vital signs: reviewed and stable  Pain management: adequate  Airway patency: patent    PONV status at discharge: No PONV  Anesthetic complications: no      Cardiovascular status: blood pressure returned to baseline, hemodynamically stable and stable  Respiratory status: unassisted and spontaneous ventilation  Hydration status: euvolemic  Follow-up not needed.          Vitals Value Taken Time   /72 11/10/23 1341   Temp 36.7 °C (98.1 °F) 11/10/23 1320   Pulse 87 11/10/23 1347   Resp 21 11/10/23 1346   SpO2 99 % 11/10/23 1346   Vitals shown include unvalidated device data.      No case tracking events are documented in the log.      Pain/Haresh Score: Pain Rating Prior to Med Admin: 10 (11/10/2023  8:39 AM)  Pain Rating Post Med Admin: 0 (11/9/2023  6:17 PM)  Haresh Score: 10 (11/10/2023  1:47 PM)

## 2023-11-10 NOTE — CLINICAL REVIEW
PA review       In Progress   Last Updated by Clari English MD on 11/10/2023 1058     Review Status Created By   In Primary Clari English MD      Criteria Review   83-year-old female initially admitted on the recommendations of her colorectal surgeon after she has been treated conservatively for painful hemorrhoids.  She was seen when her daughter subjectively noticed a foul smell and hemorrhoidal gangrene.  She was told to go to the ER for a CT scan.  This demonstrated inflammatory changes surrounding the rectum and anus with gas and fluid noted within the right gluteal cleft.  There was a noted pus being expressed.  She was placed on IV anti-infective therapy.  Cultures are growing out gram-negative rods.  There is also noted that the patient had a mild acute kidney injury which has now become worse with a creatinine jumping from 1.3-1.45.  The patient continues on IV fluids, surgical evaluation who will be performing invasive intervention given the subjective and objective findings.  Would favor inpatient level of care           Clari English MD  Utilization Management  Physician Advisor  [facility]  11/10/2023

## 2023-11-10 NOTE — OP NOTE
[facility] - 8th Floor Med Surg  Operative Note      Date of Procedure: 11/10/2023     Procedure:   I&D debridement of perirectal abscess  Excision of 2 external hemorrhoids    Surgeon(s) and Role:     * Jamie Garcia MD - Primary    Assisting Surgeon: None    Pre-Operative Diagnosis: Gangrene of hemorrhoid [K64.8, I96]    Post-Operative Diagnosis: Same    Anesthesia: General    Estimated Blood Loss (EBL): 20 mL           Description of Technical Procedures:  After informed consent was obtained, patient was brought to the operating room.  General endotracheal anesthesia was administered by member of the anesthesia team.  Patient was placed in the prone carli-knife position.  Buttocks were taped apart for exposure.  The perianal skin was prepped and draped in sterile surgical fashion.  She had 2 large right-sided external hemorrhoid tags that were significantly swollen.  Purulence was draining from the anus.  A medium Hill-Diamond retractor was inserted in all 4 quadrants inspected.  The right posterior hemorrhoidal column was gangrenous with the mucosal defect and necrotic underlying tissue.  The gangrenous hemorrhoid and necrotic underlying tissue was sharply excised with the electrocautery and Metzenbaum scissors.  The corresponding external hemorrhoid was sharply excised as well.  The abscess was noted to track submucosally around to the anterior rectum.  The right anterior external hemorrhoid and tag were sharply excised as well.  All necrotic appearing tissue was sharply debrided.  Upon completion there was only approximally a 25-30% of normal remaining mucosa from the anterior midline to the left lateral position.  The sphincter muscles could never be clearly identified and were likely involved in the infected, gangrenous, necrotic tissue.  Given the amount of infection and necrotic tissue, she is at high risk for incontinence, ongoing infection requiring further debridement, or fistula  formation.  The rectum was irrigated with copious amounts of saline and hemostasis was achieved with spot cautery.  Lidocaine 1% with epinephrine was used for local anesthesia.  A dry gauze dressing was applied and secured with tape.  She tolerated the procedure well and there were no complications.  She was returned to the supine position.  She was extubated in the operating room then subsequently transferred to recovery in satisfactory condition.

## 2023-11-10 NOTE — ANESTHESIA PROCEDURE NOTES
Intubation    Date/Time: 11/10/2023 11:53 AM    Performed by: Dirk Machuca CRNA  Authorized by: Jasper Lancaster MD    Intubation:     Induction:  Intravenous    Intubated:  Postinduction    Mask Ventilation:  Easy mask    Attempts:  2    Attempted By:  Student (Domenica CORTES)    Method of Intubation:  Direct    Blade:  Santos 2    Laryngeal View Grade: Grade IV - neither epiglottis nor glottis seen      Performed by: Intubated by Domenica CORTES.    Method of Intubation (2nd Attempt):  Video laryngoscopy    Blade (2nd Attempt):  Brennan 3    Laryngeal View Grade (2nd Attempt): Grade I - full view of cords      Difficult Airway Encountered?: No      Complications:  None    Airway Device:  Oral endotracheal tube    Airway Device Size:  7.0    Style/Cuff Inflation:  Cuffed (inflated to minimal occlusive pressure)    Inflation Amount (mL):  6    Tube secured:  21    Secured at:  The lips    Placement Verified By:  Capnometry    Complicating Factors:  None    Findings Post-Intubation:  BS equal bilateral

## 2023-11-10 NOTE — PLAN OF CARE
11/10/23 1501   Discharge Assessment   Assessment Type Discharge Planning Assessment   Confirmed/corrected address, phone number and insurance   (Pt will b going to stay at pt's dgtr home: [address] [place])   Source of Information family  (Zahra adames)   Communicated SEMAJ with patient/caregiver Date not available/Unable to determine   Reason For Admission Gangrene hemorrhoid   People in Home spouse  (Pt lives with her , Topher in a single story home with 4 steps to enter and no rails. Pt will be staying at her dgtr's home with no steps to enter home)   Do you expect to return to your current living situation? No  (going to stay with Silvia adames)   Do you have help at home or someone to help you manage your care at home? Yes   Who are your caregiver(s) and their phone number(s)? Pt's Zahra adames will be able to assist pt--#[phone]   Prior to hospitilization cognitive status: Alert/Oriented   Current cognitive status: Unable to Assess  (Pt just got back from sx)   Walking or Climbing Stairs ambulation difficulty, requires equipment   Mobility Management rollator   Home Layout Able to live on 1st floor   Equipment Currently Used at Home rollator;3-in-1 commode;shower chair  (pt's dgtr reports the BSC and shower chair are old)   Readmission within 30 days? No   Patient currently being followed by outpatient case management? Yes   Do you currently have service(s) that help you manage your care at home? Yes   How Many hours does patient receive services 3   Name and Contact number of agency [facility] in [place]   Is the pt/caregiver preference to resume services with current agency Yes   Who is going to help you get home at discharge? Zahra adames   How do you get to doctors appointments? family or friend will provide   Are you on dialysis? No   DME Needed Upon Discharge  other (see comments)  (Pt has an old BSC and dgita is requesting a new one)   Discharge Plan discussed with: Adult children    Transition of Care Barriers None   Discharge Plan A Home Health   Discharge Plan B Home Health   Housing Stability   In the last 12 months, was there a time when you were not able to pay the mortgage or rent on time? N   Transportation Needs   In the past 12 months, has lack of transportation kept you from medical appointments or from getting medications? no   Food Insecurity   Within the past 12 months, you worried that your food would run out before you got the money to buy more. Never true   OTHER   Name(s) of People in Home , Topher     Pt's PCP is Marcia Vaughan. Her  is her dgtr, Zahra ([phone]). Pt uses [facility] in [place]. Pt does not drive. Her family brings her to [place]. Pt is current with [facility]  in [place]. April [facility] will fax clinical updates to [facility] in [place]. CM to follow

## 2023-11-10 NOTE — PROGRESS NOTES
[facility] Hospital Medicine Progress Note        Chief Complaint: Inpatient Follow-up for Hemorrhoids (Pt reports pov  from Dr. Garcia's office with reports of necrotic hemorrhoids x2 weeks. Sent here by MD to be admitted through the ED. Pt reports taking OTC meds with no relief. Denies bleeding, but does report frequent bowel movements. )    HPI: 84 yo female with PMHx as mentioned below was sent to the ED after pt was seen by  Dr. Garcia in the office today for further evaluation of necrotic hemorrhoids. History is significant for recent admission in 10/2023 for hematochezia and pt underwent colonoscopy on 10/21/23 which revealed multiple diverticula, sigmoid colon polyp and hypertrophied anal papilla. Following colonoscopy pt developed a painful hemorrhoid and was seen by General surgery suggested conservative management. Pt reports in the last few days hemorrhoid became more painful and now it is hanging from rectum with active foul smell brownish discharge. Pt denies associated fever , chills, night sweats or other symptoms. On arrival she was afebrile, mod hypertensive with normal HR and RR. O2 sat 99% on RA. Labs showed normal WBC, Hgb 9.2, Cr 1.3, LA 1.0. CT pelvis showed inflammatory changes surround the rectum and anus with gas and minimal fluid within the right gluteal cleft measuring 3.6 cm extending somewhat superiorly on the left.  On exam, swelling, erythema with active pus discharge noted from anus. Blood cultures were obtained and pt initiated on Zosyn. Dr. Garcia is planning for EUA in am     Interval Hx:     11-10-23 Dr. Thomas-patient is status post I and D debridement of perirectal abscess as well as excision of 2 external hemorrhoids by Dr. Garcia with Colorectal surgery          Case was discussed with patient's nurse and  on the floor.  Objective/physical exam:  General appearance:  Elderly female chronically ill-appearing; nontoxic reports of  rectal pain     HENT: Atraumatic head. Moist mucous membranes of oral cavity.    Eyes: PERRL    Lungs: Clear to auscultation bilaterally. No wheezing present.     Heart: Regular rate and rhythm. S1 and S2 present cap refill brisk    Abdomen: Soft, non-distended, non-tender. No rebound tenderness/guarding. Bowel sounds are normal.     Extremities: No cyanosis, clubbing, atraumatic    Skin: warm and dry    Neuro:Oriented x 3, no focal deficits    Psych/mental status: Appropriate mood and affect., cooperative    VITAL SIGNS: 24 HRS MIN & MAX LAST   Temp  Min: 97.3 °F (36.3 °C)  Max: 98.4 °F (36.9 °C) 98.1 °F (36.7 °C)   BP  Min: 107/70  Max: 181/76 (!) 153/71   Pulse  Min: 68  Max: 93  85   Resp  Min: 16  Max: 20 20   SpO2  Min: 96 %  Max: 100 % 98 %     I have reviewed the following labs:  Recent Labs   Lab 11/09/23  1138 11/10/23  0448   WBC 10.28 7.22   RBC 3.28* 3.16*   HGB 9.2* 9.0*   HCT 29.2* 28.7*   MCV 89.0 90.8   MCH 28.0 28.5   MCHC 31.5* 31.4*   RDW 13.7 13.9    323   MPV 10.3 10.9*     Recent Labs   Lab 11/09/23  1138 11/10/23  0448    138   K 4.2 4.4   CO2 24 25   BUN 13.3 11.9   CREATININE 1.30* 1.45*   CALCIUM 8.9 9.1   ALBUMIN 3.4 3.1*   ALKPHOS 71 61   ALT 10 8   AST 14 14   BILITOT 0.7 0.9     Microbiology Results (last 7 days)       Procedure Component Value Units Date/Time    Blood culture #1 **CANNOT BE ORDERED STAT** [[id number]]  (Normal) Collected: 11/09/23 1138    Order Status: Completed Specimen: Blood Updated: 11/10/23 1300     CULTURE, BLOOD ([facility]) No Growth At 24 Hours    Blood culture #2 **CANNOT BE ORDERED STAT** [[id number]]  (Normal) Collected: 11/09/23 1138    Order Status: Completed Specimen: Blood Updated: 11/10/23 1201     CULTURE, BLOOD ([facility]) No Growth At 24 Hours    Wound Culture [[id number]]  (Abnormal) Collected: 11/09/23 1129    Order Status: Completed Specimen: Drainage from Anus Updated: 11/10/23 0945     Wound Culture Many Gram-negative Rods             See  below for Radiology    Scheduled Med:   amLODIPine  5 mg Oral Daily    atorvastatin  10 mg Oral QHS    metoprolol tartrate  25 mg Oral BID    piperacillin-tazobactam (Zosyn) IV (PEDS and ADULTS) (extended infusion is not appropriate)  4.5 g Intravenous Q8H    tamsulosin  0.4 mg Oral Daily    tenofovir alafenamide  25 mg Oral Daily      Continuous Infusions:     PRN Meds:  acetaminophen, acetaminophen, albuterol, dextrose 10%, dextrose 10%, glucagon (human recombinant), glucose, glucose, HYDROcodone-acetaminophen, insulin aspart U-100, morphine, naloxone, ondansetron, prochlorperazine, sodium chloride 0.9%     Assessment/Plan:  Thrombosed hemorrhoids/ perirectal abscess post I and D debridement of perirectal abscess as well as excision of 2 external hemorrhoids     Acute on chronic rectal pain- POA  -improved    Acute on chronic kidney disease- POA  -improving     Anemia- chronic disease- POA    DM type II with hyperglycemia- POA  - controlled    Weakness- POA      Plan- continue zosyn/bowel program/hydration/am labs          History of chronic hepatitis-B on Vemlidy, HLD, TIA on aspirin, osteoarthritis    VTE prophylaxis:     Patient condition:  Stable/Fair/Guarded/ Serious/ Critical    Anticipated discharge and Disposition:         All diagnosis and differential diagnosis have been reviewed; assessment and plan has been documented; I have personally reviewed the labs and test results that are presently available; I have reviewed the patients medication list; I have reviewed the consulting providers response and recommendations. I have reviewed or attempted to review medical records based upon their availability    All of the patient's questions have been  addressed and answered. Patient's is agreeable to the above stated plan. I will continue to monitor closely and make adjustments to medical management as needed.  _____________________________________________________________________    Nutrition  Status:    Radiology:  I have personally reviewed the following imaging and agree with the radiologist.     CT Pelvis With IV Contrast  Narrative: CLINICAL HISTORY:  Trauma.    TECHNIQUE:  Maxillofacial CT was performed without  contrast. There are sagittal and coronal reconstructed images available for review.    Automatic exposure control was utilized to reduce the patient's radiation dose.    DLP = 526    COMPARISON:  No prior imaging available for comparison.    FINDINGS:  Inflammatory changes surround the rectum and anus with gas and minimal fluid noted within the right gluteal cleft measuring 3.6 cm.  This extends somewhat superiorly on the left (series 2, image 72).  Findings concerning for perirectal abscess or possible fistula.    Diverticulosis without evidence of diverticulitis.  Anti dependent air is noted within the bladder.  Correlate for recent catheterization.  No acute osseous.  Impression: Inflammatory changes surround the rectum and anus with gas and minimal fluid noted within the right gluteal cleft measuring 3.6 cm. This extends somewhat superiorly on the left (series 2, image 72).    Electronically signed by: Rory Zambrano  Date:    11/09/2023  Time:    13:38      Markus Thomas MD   11/10/2023

## 2023-11-10 NOTE — TRANSFER OF CARE
"Anesthesia Transfer of Care Note    Patient: Tonie Tejada    Procedure(s) Performed: Procedure(s) (LRB):  Exam under anesthesia (N/A)    Patient location: PACU    Transport from OR: Transported from OR on room air with adequate spontaneous ventilation    Post pain: adequate analgesia    Post assessment: no apparent anesthetic complications and tolerated procedure well    Post vital signs: stable    Level of consciousness: awake and alert    Nausea/Vomiting: no nausea/vomiting    Complications: none    Transfer of care protocol was followed      Last vitals:   Visit Vitals  BP (!) 147/68 (BP Location: Left arm, Patient Position: Lying)   Pulse 93   Temp 36.3 °C (97.3 °F)   Resp 18   Ht 5' 2" (1.575 m)   Wt 77.1 kg (170 lb)   SpO2 99%   Breastfeeding No   BMI 31.09 kg/m²     "

## 2023-11-10 NOTE — PT/OT/SLP PROGRESS
Physical Therapy      Patient Name:  Tonie Tejada   MRN:  [id number]    Attempted to see pt for PT at 0925. Pt and family member present, both politely but firmly declined PT at this time. Reported feeling too fatigued and being in too much pain to participate, as well as wanting to save her strength for pending surgery.     Patient not seen today secondary to Patient unwilling to participate, Pain, Patient fatigue. Will follow-up tomorrow as scheduling allows.

## 2023-11-10 NOTE — PT/OT/SLP PROGRESS
Occupational Therapy      Patient Name:  Tonie Tejada   MRN:  [id number]    Patient not seen today secondary to being off floor for surgery per RN. Will follow-up as appropriate and when schedule permits.     11/10/2023

## 2023-11-11 LAB
ALBUMIN SERPL-MCNC: 2.9 G/DL (ref 3.4–4.8)
ALBUMIN/GLOB SERPL: 0.8 RATIO (ref 1.1–2)
ALP SERPL-CCNC: 58 UNIT/L (ref 40–150)
ALT SERPL-CCNC: 7 UNIT/L (ref 0–55)
APPEARANCE UR: CLEAR
AST SERPL-CCNC: 11 UNIT/L (ref 5–34)
BACTERIA #/AREA URNS AUTO: NORMAL /HPF
BASOPHILS # BLD AUTO: 0.03 X10(3)/MCL
BASOPHILS NFR BLD AUTO: 0.3 %
BILIRUB SERPL-MCNC: 0.7 MG/DL
BILIRUB UR QL STRIP.AUTO: NEGATIVE
BUN SERPL-MCNC: 15.6 MG/DL (ref 9.8–20.1)
CALCIUM SERPL-MCNC: 8.5 MG/DL (ref 8.4–10.2)
CHLORIDE SERPL-SCNC: 103 MMOL/L (ref 98–107)
CO2 SERPL-SCNC: 24 MMOL/L (ref 23–31)
COLOR UR AUTO: NORMAL
CREAT SERPL-MCNC: 1.71 MG/DL (ref 0.55–1.02)
EOSINOPHIL # BLD AUTO: 0 X10(3)/MCL (ref 0–0.9)
EOSINOPHIL NFR BLD AUTO: 0 %
ERYTHROCYTE [DISTWIDTH] IN BLOOD BY AUTOMATED COUNT: 13.9 % (ref 11.5–17)
GFR SERPLBLD CREATININE-BSD FMLA CKD-EPI: 29 MLS/MIN/1.73/M2
GLOBULIN SER-MCNC: 3.7 GM/DL (ref 2.4–3.5)
GLUCOSE SERPL-MCNC: 131 MG/DL (ref 82–115)
GLUCOSE UR QL STRIP.AUTO: NORMAL
HCT VFR BLD AUTO: 27 % (ref 37–47)
HGB BLD-MCNC: 8.7 G/DL (ref 12–16)
IMM GRANULOCYTES # BLD AUTO: 0.06 X10(3)/MCL (ref 0–0.04)
IMM GRANULOCYTES NFR BLD AUTO: 0.5 %
KETONES UR QL STRIP.AUTO: NEGATIVE
LEUKOCYTE ESTERASE UR QL STRIP.AUTO: NEGATIVE
LYMPHOCYTES # BLD AUTO: 0.87 X10(3)/MCL (ref 0.6–4.6)
LYMPHOCYTES NFR BLD AUTO: 7.8 %
MAGNESIUM SERPL-MCNC: 2.1 MG/DL (ref 1.6–2.6)
MCH RBC QN AUTO: 28.9 PG (ref 27–31)
MCHC RBC AUTO-ENTMCNC: 32.2 G/DL (ref 33–36)
MCV RBC AUTO: 89.7 FL (ref 80–94)
MONOCYTES # BLD AUTO: 0.61 X10(3)/MCL (ref 0.1–1.3)
MONOCYTES NFR BLD AUTO: 5.5 %
NEUTROPHILS # BLD AUTO: 9.52 X10(3)/MCL (ref 2.1–9.2)
NEUTROPHILS NFR BLD AUTO: 85.9 %
NITRITE UR QL STRIP.AUTO: NEGATIVE
NRBC BLD AUTO-RTO: 0 %
PH UR STRIP.AUTO: 6.5 [PH]
PLATELET # BLD AUTO: 330 X10(3)/MCL (ref 130–400)
PMV BLD AUTO: 10.6 FL (ref 7.4–10.4)
POCT GLUCOSE: 130 MG/DL (ref 70–110)
POCT GLUCOSE: 141 MG/DL (ref 70–110)
POCT GLUCOSE: 146 MG/DL (ref 70–110)
POTASSIUM SERPL-SCNC: 4.6 MMOL/L (ref 3.5–5.1)
PROT SERPL-MCNC: 6.6 GM/DL (ref 5.8–7.6)
PROT UR QL STRIP.AUTO: NEGATIVE
RBC # BLD AUTO: 3.01 X10(6)/MCL (ref 4.2–5.4)
RBC #/AREA URNS AUTO: NORMAL /HPF
RBC UR QL AUTO: NEGATIVE
SODIUM SERPL-SCNC: 134 MMOL/L (ref 136–145)
SP GR UR STRIP.AUTO: 1.01 (ref 1–1.03)
SQUAMOUS #/AREA URNS LPF: NORMAL /HPF
UROBILINOGEN UR STRIP-ACNC: NORMAL
WBC # SPEC AUTO: 11.09 X10(3)/MCL (ref 4.5–11.5)
WBC #/AREA URNS AUTO: NORMAL /HPF

## 2023-11-11 PROCEDURE — C1751 CATH, INF, PER/CENT/MIDLINE: HCPCS

## 2023-11-11 PROCEDURE — 85025 COMPLETE CBC W/AUTO DIFF WBC: CPT | Performed by: INTERNAL MEDICINE

## 2023-11-11 PROCEDURE — 97166 OT EVAL MOD COMPLEX 45 MIN: CPT

## 2023-11-11 PROCEDURE — 36410 VNPNXR 3YR/> PHY/QHP DX/THER: CPT

## 2023-11-11 PROCEDURE — 80053 COMPREHEN METABOLIC PANEL: CPT | Performed by: INTERNAL MEDICINE

## 2023-11-11 PROCEDURE — 63600175 PHARM REV CODE 636 W HCPCS: Performed by: INTERNAL MEDICINE

## 2023-11-11 PROCEDURE — 25000003 PHARM REV CODE 250: Performed by: INTERNAL MEDICINE

## 2023-11-11 PROCEDURE — 99900035 HC TECH TIME PER 15 MIN (STAT)

## 2023-11-11 PROCEDURE — 11000001 HC ACUTE MED/SURG PRIVATE ROOM

## 2023-11-11 PROCEDURE — 81001 URINALYSIS AUTO W/SCOPE: CPT | Performed by: INTERNAL MEDICINE

## 2023-11-11 PROCEDURE — A4216 STERILE WATER/SALINE, 10 ML: HCPCS | Performed by: INTERNAL MEDICINE

## 2023-11-11 PROCEDURE — 94761 N-INVAS EAR/PLS OXIMETRY MLT: CPT

## 2023-11-11 PROCEDURE — 97162 PT EVAL MOD COMPLEX 30 MIN: CPT

## 2023-11-11 PROCEDURE — 25000003 PHARM REV CODE 250: Performed by: COLON & RECTAL SURGERY

## 2023-11-11 PROCEDURE — 83735 ASSAY OF MAGNESIUM: CPT | Performed by: INTERNAL MEDICINE

## 2023-11-11 RX ORDER — DIPHENHYDRAMINE HCL 25 MG
25 CAPSULE ORAL ONCE
Status: COMPLETED | OUTPATIENT
Start: 2023-11-11 | End: 2023-11-11

## 2023-11-11 RX ORDER — HYDROCORTISONE ACETATE PRAMOXINE HCL 1; 1 G/100G; G/100G
CREAM TOPICAL 2 TIMES DAILY
Status: DISCONTINUED | OUTPATIENT
Start: 2023-11-11 | End: 2023-11-11

## 2023-11-11 RX ORDER — SODIUM CHLORIDE 0.9 % (FLUSH) 0.9 %
10 SYRINGE (ML) INJECTION
Status: DISCONTINUED | OUTPATIENT
Start: 2023-11-11 | End: 2023-11-17 | Stop reason: HOSPADM

## 2023-11-11 RX ORDER — SODIUM CHLORIDE 0.9 % (FLUSH) 0.9 %
10 SYRINGE (ML) INJECTION EVERY 6 HOURS
Status: DISCONTINUED | OUTPATIENT
Start: 2023-11-11 | End: 2023-11-17 | Stop reason: HOSPADM

## 2023-11-11 RX ORDER — SODIUM CHLORIDE 9 MG/ML
INJECTION, SOLUTION INTRAVENOUS CONTINUOUS
Status: DISCONTINUED | OUTPATIENT
Start: 2023-11-11 | End: 2023-11-16

## 2023-11-11 RX ORDER — LIDOCAINE 40 MG/G
CREAM TOPICAL
Status: DISCONTINUED | OUTPATIENT
Start: 2023-11-11 | End: 2023-11-17 | Stop reason: HOSPADM

## 2023-11-11 RX ADMIN — PIPERACILLIN AND TAZOBACTAM 4.5 G: 4; .5 INJECTION, POWDER, LYOPHILIZED, FOR SOLUTION INTRAVENOUS; PARENTERAL at 08:11

## 2023-11-11 RX ADMIN — SODIUM CHLORIDE, PRESERVATIVE FREE 10 ML: 5 INJECTION INTRAVENOUS at 05:11

## 2023-11-11 RX ADMIN — HYDROCODONE BITARTRATE AND ACETAMINOPHEN 1 TABLET: 7.5; 325 TABLET ORAL at 05:11

## 2023-11-11 RX ADMIN — METOPROLOL TARTRATE 25 MG: 25 TABLET, FILM COATED ORAL at 08:11

## 2023-11-11 RX ADMIN — PIPERACILLIN AND TAZOBACTAM 4.5 G: 4; .5 INJECTION, POWDER, LYOPHILIZED, FOR SOLUTION INTRAVENOUS; PARENTERAL at 09:11

## 2023-11-11 RX ADMIN — HYDROCODONE BITARTRATE AND ACETAMINOPHEN 1 TABLET: 7.5; 325 TABLET ORAL at 03:11

## 2023-11-11 RX ADMIN — PIPERACILLIN AND TAZOBACTAM 4.5 G: 4; .5 INJECTION, POWDER, LYOPHILIZED, FOR SOLUTION INTRAVENOUS; PARENTERAL at 01:11

## 2023-11-11 RX ADMIN — METOPROLOL TARTRATE 25 MG: 25 TABLET, FILM COATED ORAL at 09:11

## 2023-11-11 RX ADMIN — TAMSULOSIN HYDROCHLORIDE 0.4 MG: 0.4 CAPSULE ORAL at 09:11

## 2023-11-11 RX ADMIN — ATORVASTATIN CALCIUM 10 MG: 10 TABLET, FILM COATED ORAL at 08:11

## 2023-11-11 RX ADMIN — POLYETHYLENE GLYCOL 3350 17 G: 17 POWDER, FOR SOLUTION ORAL at 09:11

## 2023-11-11 RX ADMIN — SODIUM CHLORIDE: 9 INJECTION, SOLUTION INTRAVENOUS at 06:11

## 2023-11-11 RX ADMIN — AMLODIPINE BESYLATE 5 MG: 5 TABLET ORAL at 09:11

## 2023-11-11 RX ADMIN — HYDROCODONE BITARTRATE AND ACETAMINOPHEN 1 TABLET: 7.5; 325 TABLET ORAL at 08:11

## 2023-11-11 RX ADMIN — LIDOCAINE 4%: 4 CREAM TOPICAL at 02:11

## 2023-11-11 RX ADMIN — DIPHENHYDRAMINE HYDROCHLORIDE 25 MG: 25 CAPSULE ORAL at 08:11

## 2023-11-11 NOTE — PT/OT/SLP EVAL
Physical Therapy Evaluation    Patient Name:  Tonie Tejada   MRN:  [id number]    Recommendations:     Discharge therapy intensity: Moderate Intensity Therapy vs. Low intensity pending progress  Discharge Equipment Recommendations: walker, rolling   Barriers to discharge: Impaired mobility    Assessment:     Tonie Tejada is a 83 y.o. female admitted with a medical diagnosis of gangrene of hemorrhoid s/p I&D donald-rectal abscess and excision of 2 hemorrhoids. PT/OT observed significant L-sided weakness, pt and daughter unable to recall onset other than generalized weakness from recent hospitalizations. RN notified.  She presents with the following impairments/functional limitations: weakness, impaired endurance, impaired functional mobility, gait instability, impaired balance, impaired self care skills, decreased upper extremity function, decreased lower extremity function, decreased safety awareness, pain . Endurance deficits with LLE buckling with ambulation, may need mod to low intensity therapy but will require 24/7 assist at home.    Rehab Prognosis: Good; patient would benefit from acute skilled PT services to address these deficits and reach maximum level of function.    Recent Surgery: Procedure(s) (LRB):  Exam under anesthesia (N/A) 1 Day Post-Op    Plan:     During this hospitalization, patient to be seen 5 x/week to address the identified rehab impairments via gait training, therapeutic activities, therapeutic exercises and progress toward the following goals:    Plan of Care Expires:  12/11/23    Subjective     Chief Complaint: fatigue, pain with sitting  Patient/Family Comments/goals: get stronger  Pain/Comfort:  Pain Rating 1: 5/10  Location 1:  (buttocks)  Pain Addressed 1: Reposition, Distraction, Cessation of Activity    Patients cultural, spiritual, Methodist conflicts given the current situation: no    Living Environment:  Pt normally lives with her spouse in a [facility] with 5 RONEL but will be  staying with her daughter in a [facility] with 2-3 RONEL the dining area.  Prior to admission, patients level of function was Stephen with rollator?.  Equipment used at home: rollator.  DME owned (not currently used): none.  Upon discharge, patient will have assistance from daughter.    Objective:     Communicated with RN prior to session.  Patient found HOB elevated with PICC line  upon PT entry to room.    General Precautions: Standard, fall  Orthopedic Precautions:    Braces:    Respiratory Status: Room air  Blood Pressure: NT      Exams:  RLE Strength: WFL  LLE Strength: 4-/5 hip flexion, 3+/5 knee extension, 4/5 ankle DF   Skin integrity: Wound rectal area - RN Samina present to provide wound care on toilet      Functional Mobility:  Bed Mobility:     Supine to Sit: minimum assistance  Sit to Supine: moderate assistance  Transfers:     Sit to Stand:  moderate assistance with rolling walker  Toilet Transfer: minimum assistance with  rolling walker  using  Stand Pivot  Gait: 1x10ft and 1x20ft with RW with [place], needed max verbal cues to direct patient to EOB, slightly confused      AM-PAC 6 CLICK MOBILITY  Total Score:16       Treatment & Education:  Voided urine on toilet    Patient and daughter/s were provided with verbal education education regarding role of PT in acute setting, PT POC, discharge recs, mobility in hospital, safety precautions.  .  Understanding was verbalized.     Patient left HOB elevated with all lines intact, call button in reach, RN notified, and daughter present.    GOALS:   Multidisciplinary Problems       Physical Therapy Goals          Problem: Physical Therapy    Goal Priority Disciplines Outcome Goal Variances Interventions   Physical Therapy Goal     PT, PT/OT Ongoing, Progressing     Description: Goals to be met by: 23     Patient will increase functional independence with mobility by performin. Supine to sit with [place]  2. Sit to stand transfer with Stand-by  Assistance  3. Gait  x 150 feet with Stand-by Assistance using Rolling Walker.   4. Ascend/descend 3 stair with no Handrails with handheld assist.                         History:     Past Medical History:   Diagnosis Date    Chronic hepatitis B     DM (diabetes mellitus)     HLD (hyperlipidemia)     HTN (hypertension)     OA (osteoarthritis)     TIA (transient ischemic attack)        Past Surgical History:   Procedure Laterality Date     SECTION      COLONOSCOPY N/A 10/21/2023    Procedure: COLONOSCOPY;  Surgeon: Jasper Davis MD;  Location: [facility] OR;  Service: Gastroenterology;  Laterality: N/A;    COLONOSCOPY, WITH POLYPECTOMY USING SNARE  10/21/2023    Procedure: COLONOSCOPY, WITH POLYPECTOMY USING SNARE;  Surgeon: Jasper Davis MD;  Location: [facility] OR;  Service: Gastroenterology;;    ESOPHAGOGASTRODUODENOSCOPY N/A 10/20/2023    Procedure: EGD;  Surgeon: Cody Urbina MD;  Location: [facility] ENDOSCOPY;  Service: Gastroenterology;  Laterality: N/A;    HYSTERECTOMY      JOINT REPLACEMENT         Time Tracking:     PT Received On: 23  PT Start Time: 953     PT Stop Time: 1  PT Total Time (min): 28 min     Billable Minutes: Evaluation MOD      2023

## 2023-11-11 NOTE — PROGRESS NOTES
[facility] Hospital Medicine Progress Note        Chief Complaint: Inpatient Follow-up for Hemorrhoids (Pt reports pov  from Dr. Garcia's office with reports of necrotic hemorrhoids x2 weeks. Sent here by MD to be admitted through the ED. Pt reports taking OTC meds with no relief. Denies bleeding, but does report frequent bowel movements. )    HPI: 84 yo female with PMHx as mentioned below was sent to the ED after pt was seen by  Dr. Garcia in the office today for further evaluation of necrotic hemorrhoids. History is significant for recent admission in 10/2023 for hematochezia and pt underwent colonoscopy on 10/21/23 which revealed multiple diverticula, sigmoid colon polyp and hypertrophied anal papilla. Following colonoscopy pt developed a painful hemorrhoid and was seen by General surgery suggested conservative management. Pt reports in the last few days hemorrhoid became more painful and now it is hanging from rectum with active foul smell brownish discharge. Pt denies associated fever , chills, night sweats or other symptoms. On arrival she was afebrile, mod hypertensive with normal HR and RR. O2 sat 99% on RA. Labs showed normal WBC, Hgb 9.2, Cr 1.3, LA 1.0. CT pelvis showed inflammatory changes surround the rectum and anus with gas and minimal fluid within the right gluteal cleft measuring 3.6 cm extending somewhat superiorly on the left.  On exam, swelling, erythema with active pus discharge noted from anus. Blood cultures were obtained and pt initiated on Zosyn. Dr. Garcia is planning for EUA in am     Interval Hx:     11-10-23 Dr. Thomas-patient is status post I and D debridement of perirectal abscess as well as excision of 2 external hemorrhoids by Dr. Garcia with Colorectal surgery      11/11/2023 Dr. Thomas-chart reviewed patient examined.  Patient voices no complaints.  She continues on Zosyn.  She is status post excision 2 necrotic external hemorrhoids as well as I&D  of perirectal abscess.  Pain fairly controlled    Case was discussed with patient's nurse and  on the floor.  Objective/physical exam:  General appearance:  Elderly female chronically ill-appearing; nontoxic reports of rectal pain     HENT: Atraumatic head. Moist mucous membranes of oral cavity.    Eyes: PERRL    Lungs: Clear to auscultation bilaterally. No wheezing present.     Heart: Regular rate and rhythm. S1 and S2 present cap refill brisk    Abdomen: Soft, non-distended, non-tender. No rebound tenderness/guarding.  Hypoactive bowel sounds     Extremities: No cyanosis, clubbing, atraumatic    Skin: warm and dry    Neuro:Oriented x 3, no focal deficits    Psych/mental status: Appropriate mood and affect., cooperative    VITAL SIGNS: 24 HRS MIN & MAX LAST   Temp  Min: 97.9 °F (36.6 °C)  Max: 98.7 °F (37.1 °C) 97.9 °F (36.6 °C)   BP  Min: 97/42  Max: 149/63 (!) 97/42   Pulse  Min: 66  Max: 88  78   Resp  Min: 16  Max: 18 18   SpO2  Min: 97 %  Max: 99 % 98 %     I have reviewed the following labs:  Recent Labs   Lab 11/09/23  1138 11/10/23  0448 11/11/23 0440   WBC 10.28 7.22 11.09   RBC 3.28* 3.16* 3.01*   HGB 9.2* 9.0* 8.7*   HCT 29.2* 28.7* 27.0*   MCV 89.0 90.8 89.7   MCH 28.0 28.5 28.9   MCHC 31.5* 31.4* 32.2*   RDW 13.7 13.9 13.9    323 330   MPV 10.3 10.9* 10.6*       Recent Labs   Lab 11/09/23  1138 11/10/23  0448 11/11/23  0440    138 134*   K 4.2 4.4 4.6   CO2 24 25 24   BUN 13.3 11.9 15.6   CREATININE 1.30* 1.45* 1.71*   CALCIUM 8.9 9.1 8.5   MG  --   --  2.10   ALBUMIN 3.4 3.1* 2.9*   ALKPHOS 71 61 58   ALT 10 8 7   AST 14 14 11   BILITOT 0.7 0.9 0.7       Microbiology Results (last 7 days)       Procedure Component Value Units Date/Time    Blood culture #1 **CANNOT BE ORDERED STAT** [[id number]]  (Normal) Collected: 11/09/23 1138    Order Status: Completed Specimen: Blood Updated: 11/11/23 1301     CULTURE, BLOOD ([facility]) No Growth At 48 Hours    Blood culture #2 **CANNOT BE  ORDERED STAT** [[id number]]  (Normal) Collected: 11/09/23 1138    Order Status: Completed Specimen: Blood Updated: 11/11/23 1301     CULTURE, BLOOD ([facility]) No Growth At 48 Hours    Wound Culture [[id number]]  (Abnormal) Collected: 11/09/23 1129    Order Status: Completed Specimen: Drainage from Anus Updated: 11/11/23 1135     Wound Culture Many Morganella morganii      Many Escherichia coli      Many Enterococcus faecalis      Moderate Presumptive Pseudomonas species             See below for Radiology    Scheduled Med:   amLODIPine  5 mg Oral Daily    atorvastatin  10 mg Oral QHS    metoprolol tartrate  25 mg Oral BID    piperacillin-tazobactam (Zosyn) IV (PEDS and ADULTS) (extended infusion is not appropriate)  4.5 g Intravenous Q8H    polyethylene glycol  17 g Oral Daily    sodium chloride 0.9%  10 mL Intravenous Q6H    tamsulosin  0.4 mg Oral Daily    tenofovir alafenamide  25 mg Oral Daily      Continuous Infusions:     PRN Meds:  acetaminophen, acetaminophen, albuterol, dextrose 10%, dextrose 10%, glucagon (human recombinant), glucose, glucose, HYDROcodone-acetaminophen, insulin aspart U-100, LIDOcaine, morphine, naloxone, ondansetron, prochlorperazine, sodium chloride 0.9%, Flushing PICC/Midline Protocol **AND** sodium chloride 0.9% **AND** sodium chloride 0.9%     Assessment/Plan:  Thrombosed hemorrhoids/ perirectal abscess post I and D debridement of perirectal abscess as well as excision of 2 external hemorrhoids     Acute on chronic rectal pain- POA  -improved    Acute on chronic kidney disease- POA  -improving     Anemia- chronic disease- POA    DM type II with hyperglycemia- POA  - controlled    Weakness- POA    VAHID on CKD    Plan- continue zosyn/bowel program/hydration/am labs /normal saline solution at 75 cc         History of chronic hepatitis-B on Vemlidy, HLD, TIA on aspirin, osteoarthritis   W   VTE prophylaxis:     Patient condition:  Stable/Fair/Guarded/ Serious/ Critical    Anticipated discharge  and Disposition:         All diagnosis and differential diagnosis have been reviewed; assessment and plan has been documented; I have personally reviewed the labs and test results that are presently available; I have reviewed the patients medication list; I have reviewed the consulting providers response and recommendations. I have reviewed or attempted to review medical records based upon their availability    All of the patient's questions have been  addressed and answered. Patient's is agreeable to the above stated plan. I will continue to monitor closely and make adjustments to medical management as needed.  _____________________________________________________________________    Nutrition Status:    Radiology:  I have personally reviewed the following imaging and agree with the radiologist.     CT Pelvis With IV Contrast  Narrative: CLINICAL HISTORY:  Trauma.    TECHNIQUE:  Maxillofacial CT was performed without  contrast. There are sagittal and coronal reconstructed images available for review.    Automatic exposure control was utilized to reduce the patient's radiation dose.    DLP = 526    COMPARISON:  No prior imaging available for comparison.    FINDINGS:  Inflammatory changes surround the rectum and anus with gas and minimal fluid noted within the right gluteal cleft measuring 3.6 cm.  This extends somewhat superiorly on the left (series 2, image 72).  Findings concerning for perirectal abscess or possible fistula.    Diverticulosis without evidence of diverticulitis.  Anti dependent air is noted within the bladder.  Correlate for recent catheterization.  No acute osseous.  Impression: Inflammatory changes surround the rectum and anus with gas and minimal fluid noted within the right gluteal cleft measuring 3.6 cm. This extends somewhat superiorly on the left (series 2, image 72).    Electronically signed by: Rory Zambrano  Date:    11/09/2023  Time:    13:38      Markus Thomas MD   11/11/2023

## 2023-11-11 NOTE — PLAN OF CARE
Problem: Physical Therapy  Goal: Physical Therapy Goal  Description: Goals to be met by: 23     Patient will increase functional independence with mobility by performin. Supine to sit with [place]  2. Sit to stand transfer with Stand-by Assistance  3. Gait  x 150 feet with Stand-by Assistance using Rolling Walker.   4. Ascend/descend 3 stair with no Handrails with handheld assist.    Outcome: Ongoing, Progressing

## 2023-11-11 NOTE — PROCEDURES
"Tonie Tejada is a 83 y.o. female patient.    Temp: 98.7 °F (37.1 °C) (11/10/23 2318)  Pulse: 67 (11/10/23 2318)  Resp: 18 (11/10/23 2318)  BP: 114/66 (11/10/23 2318)  SpO2: 99 % (11/10/23 2318)  Weight: 77.1 kg (170 lb) (11/09/23 1841)  Height: 5' 2" (157.5 cm) (11/09/23 1841)    PICC  Date/Time: 11/11/2023 12:06 AM  Performed by: Rita Beverly, ELKE  Consent Done: Yes  Time out: Immediately prior to procedure a time out was called to verify the correct patient, procedure, equipment, support staff and site/side marked as required  Indications: med administration and vascular access  Anesthesia: local infiltration  Local anesthetic: lidocaine 1% without epinephrine  Anesthetic Total (mL): 4  Preparation: skin prepped with ChloraPrep  Skin prep agent dried: skin prep agent completely dried prior to procedure  Sterile barriers: all five maximum sterile barriers used - cap, mask, sterile gown, sterile gloves, and large sterile sheet  Hand hygiene: hand hygiene performed prior to central venous catheter insertion  Location details: right basilic  Catheter type: single lumen  Catheter size: 4 Fr  Catheter Length: 15cm    Ultrasound guidance: yes  Vessel Caliber: medium and patent, compressibility normal  Needle advanced into vessel with real time Ultrasound guidance.  Guidewire confirmed in vessel.  Sterile sheath used.  Number of attempts: 1  Post-procedure: blood return through all ports, sterile dressing applied and chlorhexidine patch    Complications: none  Comments: Arm circ- 31cm          Rita Beverly RN  11/11/2023    "

## 2023-11-11 NOTE — PLAN OF CARE
Problem: Adult Inpatient Plan of Care  Goal: Plan of Care Review  Outcome: Ongoing, Progressing  Goal: Patient-Specific Goal (Individualized)  Outcome: Ongoing, Progressing  Goal: Absence of Hospital-Acquired Illness or Injury  Outcome: Ongoing, Progressing  Goal: Readiness for Transition of Care  Outcome: Ongoing, Progressing     Problem: Diabetes Comorbidity  Goal: Blood Glucose Level Within Targeted Range  Outcome: Ongoing, Progressing     Problem: Fall Injury Risk  Goal: Absence of Fall and Fall-Related Injury  Outcome: Ongoing, Progressing     Problem: Skin Injury Risk Increased  Goal: Skin Health and Integrity  Outcome: Ongoing, Progressing     Problem: Infection  Goal: Absence of Infection Signs and Symptoms  Outcome: Ongoing, Progressing

## 2023-11-11 NOTE — PROGRESS NOTES
[facility] - 8th Floor Med Surg  Colon & Rectal Surgery  Progress Note    Subjective:     Interval History: NAEO. Afebrile.  Pain controlled.  Tolerating diet.  No flatus or real bowel movement yet.  Dressing to her anus was serosanguineous.       Medications:  Continuous Infusions:  Scheduled Meds:   amLODIPine  5 mg Oral Daily    atorvastatin  10 mg Oral QHS    metoprolol tartrate  25 mg Oral BID    piperacillin-tazobactam (Zosyn) IV (PEDS and ADULTS) (extended infusion is not appropriate)  4.5 g Intravenous Q8H    polyethylene glycol  17 g Oral Daily    sodium chloride 0.9%  10 mL Intravenous Q6H    tamsulosin  0.4 mg Oral Daily    tenofovir alafenamide  25 mg Oral Daily     PRN Meds:acetaminophen, acetaminophen, albuterol, dextrose 10%, dextrose 10%, glucagon (human recombinant), glucose, glucose, HYDROcodone-acetaminophen, insulin aspart U-100, morphine, naloxone, ondansetron, prochlorperazine, sodium chloride 0.9%, Flushing PICC/Midline Protocol **AND** sodium chloride 0.9% **AND** sodium chloride 0.9%     Objective:     Vital Signs (Most Recent):  Temp: 98.5 °F (36.9 °C) (11/11/23 0734)  Pulse: 68 (11/11/23 0734)  Resp: 16 (11/11/23 0545)  BP: (!) 146/86 (11/11/23 0734)  SpO2: 99 % (11/11/23 0734) Vital Signs (24h Range):  Temp:  [97.3 °F (36.3 °C)-98.7 °F (37.1 °C)] 98.5 °F (36.9 °C)  Pulse:  [66-93] 68  Resp:  [16-20] 16  SpO2:  [96 %-100 %] 99 %  BP: (107-181)/(63-86) 146/86       Intake/Output Summary (Last 24 hours) at 11/11/2023 0900  Last data filed at 11/11/2023 0631  Gross per 24 hour   Intake 1136.23 ml   Output 1600 ml   Net -463.77 ml       Gen: NAD  HEENT: PERRLA  Chest: Equal chest rise, bilateral breath sounds  Abdomen: soft, appropriately tender to palpation, non-distended.  Anus:  Serosanguineous fluid on dressing.  No purulence noted.  Extremities: Warm, well perfused.      Assessment/Plan:   83-year-old female with a past medical history of chronic hepatitis-B, type 2  diabetes, hypertension, hyperlipidemia, osteoarthritis, history of stroke, CKD stage 3 who presented to the hospital with necrotic hemorrhoids as well as a perirectal abscess, she underwent an exam under anesthesia with excisional hemorrhoidectomy, 2 columns with drainage of perirectal abscess.  Of note at the operation she was found to have obliteration of half of her sphincter complex from the abscess.      Awaiting return of bowel function, she is at high risk for fecal incontinence given destruction of her sphincter complex from the infection.  We will continue intravenous antibiotics   Ambulate t.i.d.   Scheduled MiraLax.    Nasim Munoz MD  General Surgery [facility]

## 2023-11-11 NOTE — PT/OT/SLP EVAL
Occupational Therapy  Evaluation    Name: Tonie Tejada  MRN: [id number]  Admitting Diagnosis: gangrene hemorrhoid  Recent Surgery: Procedure(s) (LRB):  Exam under anesthesia (N/A) 1 Day Post-Op    Recommendations:     Discharge therapy intensity: Moderate Intensity Therapy   Discharge Equipment Recommendations:   TTB  Barriers to discharge:   (fall risk)    Assessment:     Tonie Tejada is a 83 y.o. female with a medical diagnosis of gangrene hemorrhoid s/p I&D donald rectal abscess & excision of 2 hemorrhoids.  Of note, pt also presents with L sided weakness of unknown onset or origin per family reports (RN notified). She presents with the following performance deficits affecting function: weakness, impaired endurance, impaired self care skills, impaired functional mobility, impaired balance, decreased lower extremity function, decreased upper extremity function, pain. At this time, it appears pt would benefit from moderate intensity therapy; though she may progress to the point of low intensity therapy with 24/hr family care.     Rehab Prognosis: Good; patient would benefit from acute skilled OT services to address these deficits and reach maximum level of function.       Plan:     Patient to be seen 3 x/week to address the above listed problems via self-care/home management, therapeutic activities, therapeutic exercises, neuromuscular re-education  Plan of Care Expires: 12/08/23  Plan of Care Reviewed with: patient, daughter    Subjective     Chief Complaint: pain with sitting  Patient/Family Comments/goals: to get better    Occupational Profile:  Living Environment: Pt lives with spouse in a single story home with 5 steps to enter and B rail and tub/shower combo. There was some discussion of her d/c home with daughter who has no steps to enter, 2 interior steps to dining room, and can assist but appears to have a LE injury (wearing CAM boot).  Previous level of function: mod I with rollator, SPV for tub  t/fs, occasional falls  Roles and Routines: ADLs  Equipment Used at Home: rollator  Assistance upon Discharge: family    Pain/Comfort:  Pain Rating 1: 5/10  Location 1:  (buttocks)    Patients cultural, spiritual, Islam conflicts given the current situation: no    Objective:     OT communicated with RN after session.      Patient was found HOB elevated with peripheral IV upon OT entry to room.    General Precautions: Standard, fall  Orthopedic Precautions: N/A  Braces: N/A    Bed Mobility:    Patient completed Supine to Sit with moderate assistance  Patient completed Sit to Supine with moderate assistance    Functional Mobility/Transfers:  Patient completed Sit <> Stand Transfer with moderate assistance  with  rolling walker   Patient completed Toilet Transfer Step Transfer technique with moderate assistance with  rolling walker  Functional Mobility: min A with RW to/from toilet    Activities of Daily Living:  Lower Body Dressing: maximal assistance    Toileting: maximal assistance (RN in for dressing change)    [facility] 6 Click ADL:  AMPAC Total Score: 16    Functional Cognition:  Attention: Easily distracted  Command Following: Requires verbal cues    Upper Extremity Function:  Right Upper Extremity:   WFL    Left Upper Extremity:  Range of Motion: Impaired. Distal ROM intact, limited to 90* at shoulder with c/o pain. Pt reports this is chronic  Strength: Impaired. 3+/5    *noted: pt also with asymmetric LE strength, R>L, see PT note for details. L knee buckling noted x2    Balance:   Impaired. SBA seated, min A with RW standing    Therapeutic Positioning  Risk for acquired pressure injuries is increased due to altered skin already present.    OT interventions performed during the course of today's session:   Education was provided on benefits of and recommendations for therapeutic positioning    Skin assessment: all bony prominences were assessed    Findings: known area of altered skin integrity at anus    OT  recommendations for therapeutic positioning throughout hospitalization:   Geomat recommended for sacral protection while UIC    Patient Education:  Patient provided with verbal education education regarding OT role/goals/POC and fall prevention.  Understanding was verbalized.     Patient left HOB elevated to eat with all lines intact    GOALS:   Multidisciplinary Problems       Occupational Therapy Goals          Problem: Occupational Therapy    Goal Priority Disciplines Outcome Interventions   Occupational Therapy Goal     OT, PT/OT Ongoing, Progressing    Description: LTG: Pt will perform basic ADLs and ADL transfers with Modified independence using LRAD by discharge.    STG: to be met by     Pt will complete grooming standing at sink with LRAD with SBA.  Pt will complete UB dressing with SBA.  Pt will complete LB dressing with SBA using LRAD.  Pt will complete toileting with SBA using LRAD.  Pt will complete functional mobility to/from toilet and toilet transfer with SBA using LRAD.                        History:     Past Medical History:   Diagnosis Date    Chronic hepatitis B     DM (diabetes mellitus)     HLD (hyperlipidemia)     HTN (hypertension)     OA (osteoarthritis)     TIA (transient ischemic attack)          Past Surgical History:   Procedure Laterality Date     SECTION      COLONOSCOPY N/A 10/21/2023    Procedure: COLONOSCOPY;  Surgeon: Jasper Davis MD;  Location: [facility];  Service: Gastroenterology;  Laterality: N/A;    COLONOSCOPY, WITH POLYPECTOMY USING SNARE  10/21/2023    Procedure: COLONOSCOPY, WITH POLYPECTOMY USING SNARE;  Surgeon: Jasper Davis MD;  Location: [facility] OR;  Service: Gastroenterology;;    ESOPHAGOGASTRODUODENOSCOPY N/A 10/20/2023    Procedure: EGD;  Surgeon: Cody Urbina MD;  Location: [facility]COPY;  Service: Gastroenterology;  Laterality: N/A;    HYSTERECTOMY      JOINT REPLACEMENT         Time Tracking:     OT Date of Treatment: 23  OT  Start Time: 0953  OT Stop Time: 1021  OT Total Time (min): 28 min    Billable Minutes:Evaluation moderate    11/11/2023

## 2023-11-11 NOTE — PLAN OF CARE
Problem: Occupational Therapy  Goal: Occupational Therapy Goal  Description: LTG: Pt will perform basic ADLs and ADL transfers with Modified independence using LRAD by discharge.    STG: to be met by 12/8    Pt will complete grooming standing at sink with LRAD with SBA.  Pt will complete UB dressing with SBA.  Pt will complete LB dressing with SBA using LRAD.  Pt will complete toileting with SBA using LRAD.  Pt will complete functional mobility to/from toilet and toilet transfer with SBA using LRAD.   Outcome: Ongoing, Progressing

## 2023-11-12 LAB
ANION GAP SERPL CALC-SCNC: 7 MEQ/L
BASOPHILS # BLD AUTO: 0.04 X10(3)/MCL
BASOPHILS NFR BLD AUTO: 0.5 %
BUN SERPL-MCNC: 17.7 MG/DL (ref 9.8–20.1)
CALCIUM SERPL-MCNC: 8 MG/DL (ref 8.4–10.2)
CHLORIDE SERPL-SCNC: 105 MMOL/L (ref 98–107)
CO2 SERPL-SCNC: 22 MMOL/L (ref 23–31)
CREAT SERPL-MCNC: 1.52 MG/DL (ref 0.55–1.02)
CREAT/UREA NIT SERPL: 12
EOSINOPHIL # BLD AUTO: 0.07 X10(3)/MCL (ref 0–0.9)
EOSINOPHIL NFR BLD AUTO: 0.9 %
ERYTHROCYTE [DISTWIDTH] IN BLOOD BY AUTOMATED COUNT: 13.9 % (ref 11.5–17)
GFR SERPLBLD CREATININE-BSD FMLA CKD-EPI: 34 MLS/MIN/1.73/M2
GLUCOSE SERPL-MCNC: 105 MG/DL (ref 82–115)
HCT VFR BLD AUTO: 25.8 % (ref 37–47)
HGB BLD-MCNC: 8.1 G/DL (ref 12–16)
IMM GRANULOCYTES # BLD AUTO: 0.04 X10(3)/MCL (ref 0–0.04)
IMM GRANULOCYTES NFR BLD AUTO: 0.5 %
LYMPHOCYTES # BLD AUTO: 1.54 X10(3)/MCL (ref 0.6–4.6)
LYMPHOCYTES NFR BLD AUTO: 20.8 %
MAGNESIUM SERPL-MCNC: 2 MG/DL (ref 1.6–2.6)
MCH RBC QN AUTO: 28.8 PG (ref 27–31)
MCHC RBC AUTO-ENTMCNC: 31.4 G/DL (ref 33–36)
MCV RBC AUTO: 91.8 FL (ref 80–94)
MONOCYTES # BLD AUTO: 0.82 X10(3)/MCL (ref 0.1–1.3)
MONOCYTES NFR BLD AUTO: 11.1 %
NEUTROPHILS # BLD AUTO: 4.9 X10(3)/MCL (ref 2.1–9.2)
NEUTROPHILS NFR BLD AUTO: 66.2 %
NRBC BLD AUTO-RTO: 0 %
PLATELET # BLD AUTO: 302 X10(3)/MCL (ref 130–400)
PMV BLD AUTO: 10.6 FL (ref 7.4–10.4)
POCT GLUCOSE: 122 MG/DL (ref 70–110)
POCT GLUCOSE: 138 MG/DL (ref 70–110)
POCT GLUCOSE: 151 MG/DL (ref 70–110)
POCT GLUCOSE: 95 MG/DL (ref 70–110)
POTASSIUM SERPL-SCNC: 4.3 MMOL/L (ref 3.5–5.1)
RBC # BLD AUTO: 2.81 X10(6)/MCL (ref 4.2–5.4)
SODIUM SERPL-SCNC: 134 MMOL/L (ref 136–145)
WBC # SPEC AUTO: 7.41 X10(3)/MCL (ref 4.5–11.5)

## 2023-11-12 PROCEDURE — 25000003 PHARM REV CODE 250: Performed by: INTERNAL MEDICINE

## 2023-11-12 PROCEDURE — A4216 STERILE WATER/SALINE, 10 ML: HCPCS | Performed by: INTERNAL MEDICINE

## 2023-11-12 PROCEDURE — 83735 ASSAY OF MAGNESIUM: CPT | Performed by: INTERNAL MEDICINE

## 2023-11-12 PROCEDURE — 80048 BASIC METABOLIC PNL TOTAL CA: CPT | Performed by: INTERNAL MEDICINE

## 2023-11-12 PROCEDURE — 85025 COMPLETE CBC W/AUTO DIFF WBC: CPT | Performed by: INTERNAL MEDICINE

## 2023-11-12 PROCEDURE — 63600175 PHARM REV CODE 636 W HCPCS: Performed by: INTERNAL MEDICINE

## 2023-11-12 PROCEDURE — 25000003 PHARM REV CODE 250: Performed by: COLON & RECTAL SURGERY

## 2023-11-12 PROCEDURE — 11000001 HC ACUTE MED/SURG PRIVATE ROOM

## 2023-11-12 RX ADMIN — ATORVASTATIN CALCIUM 10 MG: 10 TABLET, FILM COATED ORAL at 08:11

## 2023-11-12 RX ADMIN — SODIUM CHLORIDE, PRESERVATIVE FREE 10 ML: 5 INJECTION INTRAVENOUS at 05:11

## 2023-11-12 RX ADMIN — POLYETHYLENE GLYCOL 3350 17 G: 17 POWDER, FOR SOLUTION ORAL at 08:11

## 2023-11-12 RX ADMIN — SODIUM CHLORIDE, PRESERVATIVE FREE 10 ML: 5 INJECTION INTRAVENOUS at 11:11

## 2023-11-12 RX ADMIN — PIPERACILLIN AND TAZOBACTAM 4.5 G: 4; .5 INJECTION, POWDER, LYOPHILIZED, FOR SOLUTION INTRAVENOUS; PARENTERAL at 12:11

## 2023-11-12 RX ADMIN — PIPERACILLIN AND TAZOBACTAM 4.5 G: 4; .5 INJECTION, POWDER, LYOPHILIZED, FOR SOLUTION INTRAVENOUS; PARENTERAL at 08:11

## 2023-11-12 RX ADMIN — SODIUM CHLORIDE, PRESERVATIVE FREE 10 ML: 5 INJECTION INTRAVENOUS at 12:11

## 2023-11-12 RX ADMIN — HYDROCODONE BITARTRATE AND ACETAMINOPHEN 1 TABLET: 7.5; 325 TABLET ORAL at 08:11

## 2023-11-12 RX ADMIN — TAMSULOSIN HYDROCHLORIDE 0.4 MG: 0.4 CAPSULE ORAL at 08:11

## 2023-11-12 RX ADMIN — METOPROLOL TARTRATE 25 MG: 25 TABLET, FILM COATED ORAL at 08:11

## 2023-11-12 RX ADMIN — HYDROCODONE BITARTRATE AND ACETAMINOPHEN 1 TABLET: 7.5; 325 TABLET ORAL at 04:11

## 2023-11-12 RX ADMIN — HYDROCODONE BITARTRATE AND ACETAMINOPHEN 1 TABLET: 7.5; 325 TABLET ORAL at 12:11

## 2023-11-12 RX ADMIN — AMLODIPINE BESYLATE 5 MG: 5 TABLET ORAL at 08:11

## 2023-11-12 RX ADMIN — PIPERACILLIN AND TAZOBACTAM 4.5 G: 4; .5 INJECTION, POWDER, LYOPHILIZED, FOR SOLUTION INTRAVENOUS; PARENTERAL at 04:11

## 2023-11-12 NOTE — PLAN OF CARE
Problem: Adult Inpatient Plan of Care  Goal: Plan of Care Review  Outcome: Ongoing, Progressing  Goal: Patient-Specific Goal (Individualized)  Outcome: Ongoing, Progressing  Goal: Absence of Hospital-Acquired Illness or Injury  Outcome: Ongoing, Progressing     Problem: Diabetes Comorbidity  Goal: Blood Glucose Level Within Targeted Range  Outcome: Ongoing, Progressing     Problem: Fall Injury Risk  Goal: Absence of Fall and Fall-Related Injury  Outcome: Ongoing, Progressing     Problem: Infection  Goal: Absence of Infection Signs and Symptoms  Outcome: Ongoing, Progressing

## 2023-11-12 NOTE — PROGRESS NOTES
Patient c/o itching. Notified Dr. Thomas of above. New order for Benadryl 25mg x1 dose. Care ongoing.

## 2023-11-12 NOTE — PROGRESS NOTES
[facility] - 8th Floor Med Surg  Colon & Rectal Surgery  Progress Note    Subjective:     Interval History: NAEO. Afebrile.  Pain controlled.  Tolerating diet.  No flatus or real bowel movement yet.  Dressing to her anus was serosanguineous.       Medications:  Continuous Infusions:   sodium chloride 0.9% 75 mL/hr at 11/11/23 1816     Scheduled Meds:   amLODIPine  5 mg Oral Daily    atorvastatin  10 mg Oral QHS    metoprolol tartrate  25 mg Oral BID    piperacillin-tazobactam (Zosyn) IV (PEDS and ADULTS) (extended infusion is not appropriate)  4.5 g Intravenous Q8H    polyethylene glycol  17 g Oral Daily    sodium chloride 0.9%  10 mL Intravenous Q6H    tamsulosin  0.4 mg Oral Daily    tenofovir alafenamide  25 mg Oral Daily     PRN Meds:acetaminophen, acetaminophen, albuterol, dextrose 10%, dextrose 10%, glucagon (human recombinant), glucose, glucose, HYDROcodone-acetaminophen, insulin aspart U-100, LIDOcaine, morphine, naloxone, ondansetron, prochlorperazine, sodium chloride 0.9%, Flushing PICC/Midline Protocol **AND** sodium chloride 0.9% **AND** sodium chloride 0.9%     Objective:     Vital Signs (Most Recent):  Temp: 98.4 °F (36.9 °C) (11/12/23 0740)  Pulse: 67 (11/12/23 0740)  Resp: 16 (11/12/23 0419)  BP: 120/69 (11/12/23 0740)  SpO2: 95 % (11/12/23 0740) Vital Signs (24h Range):  Temp:  [97.9 °F (36.6 °C)-98.8 °F (37.1 °C)] 98.4 °F (36.9 °C)  Pulse:  [67-88] 67  Resp:  [16-18] 16  SpO2:  [95 %-98 %] 95 %  BP: ()/(42-86) 120/69       Intake/Output Summary (Last 24 hours) at 11/12/2023 0815  Last data filed at 11/12/2023 0548  Gross per 24 hour   Intake 1303.77 ml   Output 700 ml   Net 603.77 ml       Gen: NAD  HEENT: PERRLA  Chest: Equal chest rise, bilateral breath sounds  Abdomen: soft, appropriately tender to palpation, non-distended.  Anus:  Serosanguineous fluid on dressing.  No purulence noted.  Extremities: Warm, well perfused.      Assessment/Plan:   83-year-old female with a past  medical history of chronic hepatitis-B, type 2 diabetes, hypertension, hyperlipidemia, osteoarthritis, history of stroke, CKD stage 3 who presented to the hospital with necrotic hemorrhoids as well as a perirectal abscess, she underwent an exam under anesthesia with excisional hemorrhoidectomy, 2 columns with drainage of perirectal abscess.  Of note at the operation she was found to have obliteration of half of her sphincter complex from the abscess.      Awaiting return of bowel function, she is at high risk for fecal incontinence given destruction of her sphincter complex from the infection.  We will continue intravenous antibiotics   Ambulate t.i.d.   Scheduled MiraLax.    Nasim Munoz MD  General Surgery [facility]

## 2023-11-12 NOTE — PROGRESS NOTES
[facility] Hospital Medicine Progress Note        Chief Complaint: Inpatient Follow-up for Hemorrhoids (Pt reports pov  from Dr. Garcia's office with reports of necrotic hemorrhoids x2 weeks. Sent here by MD to be admitted through the ED. Pt reports taking OTC meds with no relief. Denies bleeding, but does report frequent bowel movements. )    HPI: 84 yo female with PMHx as mentioned below was sent to the ED after pt was seen by  Dr. Garcia in the office today for further evaluation of necrotic hemorrhoids. History is significant for recent admission in 10/2023 for hematochezia and pt underwent colonoscopy on 10/21/23 which revealed multiple diverticula, sigmoid colon polyp and hypertrophied anal papilla. Following colonoscopy pt developed a painful hemorrhoid and was seen by General surgery suggested conservative management. Pt reports in the last few days hemorrhoid became more painful and now it is hanging from rectum with active foul smell brownish discharge. Pt denies associated fever , chills, night sweats or other symptoms. On arrival she was afebrile, mod hypertensive with normal HR and RR. O2 sat 99% on RA. Labs showed normal WBC, Hgb 9.2, Cr 1.3, LA 1.0. CT pelvis showed inflammatory changes surround the rectum and anus with gas and minimal fluid within the right gluteal cleft measuring 3.6 cm extending somewhat superiorly on the left.  On exam, swelling, erythema with active pus discharge noted from anus. Blood cultures were obtained and pt initiated on Zosyn. Dr. Garcia is planning for EUA in am     Interval Hx:     11-10-23 Dr. Thomas-patient is status post I and D debridement of perirectal abscess as well as excision of 2 external hemorrhoids by Dr. Garcia with Colorectal surgery      11/11/2023 Dr. Thomas-chart reviewed patient examined.  Patient voices no complaints.  She continues on Zosyn.  She is status post excision 2 necrotic external hemorrhoids as well as I&D  of perirectal abscess.  Pain fairly controlled    11/12/23- delays asking for pain control/ bowel incontinent    Case was discussed with patient's nurse and  on the floor.  Objective/physical exam:  General appearance:  Elderly female chronically ill-appearing; nontoxic reports of rectal pain     HENT: Atraumatic head. Moist mucous membranes of oral cavity.    Eyes: PERRL    Lungs: Clear to auscultation bilaterally. No wheezing present.     Heart: Regular rate and rhythm. S1 and S2 present cap refill brisk    Abdomen: Soft, non-distended, non-tender. No rebound tenderness/guarding.  Hypoactive bowel sounds     Extremities: No cyanosis, clubbing, atraumatic    Skin: warm and dry    Neuro:Oriented x 3, no focal deficits    Psych/mental status: Appropriate mood and affect., cooperative    VITAL SIGNS: 24 HRS MIN & MAX LAST   Temp  Min: 98.2 °F (36.8 °C)  Max: 98.8 °F (37.1 °C) 98.4 °F (36.9 °C)   BP  Min: 97/42  Max: 137/75 136/74   Pulse  Min: 67  Max: 81  74   Resp  Min: 16  Max: 18 18   SpO2  Min: 95 %  Max: 98 % 98 %     I have reviewed the following labs:  Recent Labs   Lab 11/10/23  0448 11/11/23  0440 11/12/23  0453   WBC 7.22 11.09 7.41   RBC 3.16* 3.01* 2.81*   HGB 9.0* 8.7* 8.1*   HCT 28.7* 27.0* 25.8*   MCV 90.8 89.7 91.8   MCH 28.5 28.9 28.8   MCHC 31.4* 32.2* 31.4*   RDW 13.9 13.9 13.9    330 302   MPV 10.9* 10.6* 10.6*       Recent Labs   Lab 11/09/23  1138 11/10/23  0448 11/11/23  0440 11/12/23  0453    138 134* 134*   K 4.2 4.4 4.6 4.3   CO2 24 25 24 22*   BUN 13.3 11.9 15.6 17.7   CREATININE 1.30* 1.45* 1.71* 1.52*   CALCIUM 8.9 9.1 8.5 8.0*   MG  --   --  2.10 2.00   ALBUMIN 3.4 3.1* 2.9*  --    ALKPHOS 71 61 58  --    ALT 10 8 7  --    AST 14 14 11  --    BILITOT 0.7 0.9 0.7  --        Microbiology Results (last 7 days)       Procedure Component Value Units Date/Time    Blood culture #1 **CANNOT BE ORDERED STAT** [[id number]]  (Normal) Collected: 11/09/23 1138    Order  Status: Completed Specimen: Blood Updated: 11/12/23 1301     CULTURE, BLOOD ([facility]) No Growth At 72 Hours    Blood culture #2 **CANNOT BE ORDERED STAT** [[id number]]  (Normal) Collected: 11/09/23 1138    Order Status: Completed Specimen: Blood Updated: 11/12/23 1301     CULTURE, BLOOD ([facility]) No Growth At 72 Hours    Wound Culture [[id number]]  (Abnormal)  (Susceptibility) Collected: 11/09/23 1129    Order Status: Completed Specimen: Drainage from Anus Updated: 11/12/23 1137     Wound Culture Many Morganella morganii      Many Escherichia coli      Many Enterococcus faecalis      Moderate Pseudomonas aeruginosa             See below for Radiology    Scheduled Med:   amLODIPine  5 mg Oral Daily    atorvastatin  10 mg Oral QHS    metoprolol tartrate  25 mg Oral BID    piperacillin-tazobactam (Zosyn) IV (PEDS and ADULTS) (extended infusion is not appropriate)  4.5 g Intravenous Q8H    polyethylene glycol  17 g Oral Daily    sodium chloride 0.9%  10 mL Intravenous Q6H    tamsulosin  0.4 mg Oral Daily    tenofovir alafenamide  25 mg Oral Daily      Continuous Infusions:   sodium chloride 0.9% 75 mL/hr at 11/11/23 1816      PRN Meds:  acetaminophen, acetaminophen, albuterol, dextrose 10%, dextrose 10%, glucagon (human recombinant), glucose, glucose, HYDROcodone-acetaminophen, insulin aspart U-100, LIDOcaine, morphine, naloxone, ondansetron, prochlorperazine, sodium chloride 0.9%, Flushing PICC/Midline Protocol **AND** sodium chloride 0.9% **AND** sodium chloride 0.9%     Assessment/Plan:  Thrombosed hemorrhoids/ perirectal abscess post I and D debridement of perirectal abscess as well as excision of 2 external hemorrhoids   - 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    Acute on chronic rectal pain- POA  -improving    Acute on chronic kidney disease- POA  -stable    Anemia- chronic disease- POA    DM type II with hyperglycemia- POA  - controlled    Weakness- POA      Plan- continue zosyn/bowel  program/hydration/am labs /normal saline solution at 75 cc         History of chronic hepatitis-B on Vemlidy, HLD, TIA on aspirin, osteoarthritis     VTE prophylaxis:     Patient condition:  Stable/Fair/Guarded/ Serious/ Critical    Anticipated discharge and Disposition:   home      All diagnosis and differential diagnosis have been reviewed; assessment and plan has been documented; I have personally reviewed the labs and test results that are presently available; I have reviewed the patients medication list; I have reviewed the consulting providers response and recommendations. I have reviewed or attempted to review medical records based upon their availability    All of the patient's questions have been  addressed and answered. Patient's is agreeable to the above stated plan. I will continue to monitor closely and make adjustments to medical management as needed.  _____________________________________________________________________    Nutrition Status:    Radiology:  I have personally reviewed the following imaging and agree with the radiologist.     CT Pelvis With IV Contrast  Narrative: CLINICAL HISTORY:  Trauma.    TECHNIQUE:  Maxillofacial CT was performed without  contrast. There are sagittal and coronal reconstructed images available for review.    Automatic exposure control was utilized to reduce the patient's radiation dose.    DLP = 526    COMPARISON:  No prior imaging available for comparison.    FINDINGS:  Inflammatory changes surround the rectum and anus with gas and minimal fluid noted within the right gluteal cleft measuring 3.6 cm.  This extends somewhat superiorly on the left (series 2, image 72).  Findings concerning for perirectal abscess or possible fistula.    Diverticulosis without evidence of diverticulitis.  Anti dependent air is noted within the bladder.  Correlate for recent catheterization.  No acute osseous.  Impression: Inflammatory changes surround the rectum and anus with gas and  minimal fluid noted within the right gluteal cleft measuring 3.6 cm. This extends somewhat superiorly on the left (series 2, image 72).    Electronically signed by: Rory Zambrano  Date:    11/09/2023  Time:    13:38      Markus Thomas MD   11/12/2023

## 2023-11-13 LAB
ANION GAP SERPL CALC-SCNC: 7 MEQ/L
BACTERIA WND CULT: ABNORMAL
BUN SERPL-MCNC: 14.9 MG/DL (ref 9.8–20.1)
CALCIUM SERPL-MCNC: 8.4 MG/DL (ref 8.4–10.2)
CHLORIDE SERPL-SCNC: 106 MMOL/L (ref 98–107)
CO2 SERPL-SCNC: 22 MMOL/L (ref 23–31)
CREAT SERPL-MCNC: 1.4 MG/DL (ref 0.55–1.02)
CREAT/UREA NIT SERPL: 11
GFR SERPLBLD CREATININE-BSD FMLA CKD-EPI: 37 MLS/MIN/1.73/M2
GLUCOSE SERPL-MCNC: 121 MG/DL (ref 82–115)
POCT GLUCOSE: 128 MG/DL (ref 70–110)
POCT GLUCOSE: 129 MG/DL (ref 70–110)
POCT GLUCOSE: 136 MG/DL (ref 70–110)
POCT GLUCOSE: 158 MG/DL (ref 70–110)
POTASSIUM SERPL-SCNC: 4.6 MMOL/L (ref 3.5–5.1)
PSYCHE PATHOLOGY RESULT: NORMAL
SODIUM SERPL-SCNC: 135 MMOL/L (ref 136–145)

## 2023-11-13 PROCEDURE — 11000001 HC ACUTE MED/SURG PRIVATE ROOM

## 2023-11-13 PROCEDURE — 80048 BASIC METABOLIC PNL TOTAL CA: CPT | Performed by: INTERNAL MEDICINE

## 2023-11-13 PROCEDURE — 25000003 PHARM REV CODE 250: Performed by: INTERNAL MEDICINE

## 2023-11-13 PROCEDURE — A4216 STERILE WATER/SALINE, 10 ML: HCPCS | Performed by: INTERNAL MEDICINE

## 2023-11-13 PROCEDURE — 63600175 PHARM REV CODE 636 W HCPCS: Performed by: INTERNAL MEDICINE

## 2023-11-13 PROCEDURE — 25000003 PHARM REV CODE 250: Performed by: COLON & RECTAL SURGERY

## 2023-11-13 RX ADMIN — METOPROLOL TARTRATE 25 MG: 25 TABLET, FILM COATED ORAL at 08:11

## 2023-11-13 RX ADMIN — PIPERACILLIN AND TAZOBACTAM 4.5 G: 4; .5 INJECTION, POWDER, LYOPHILIZED, FOR SOLUTION INTRAVENOUS; PARENTERAL at 03:11

## 2023-11-13 RX ADMIN — HYDROCODONE BITARTRATE AND ACETAMINOPHEN 1 TABLET: 7.5; 325 TABLET ORAL at 06:11

## 2023-11-13 RX ADMIN — SODIUM CHLORIDE, PRESERVATIVE FREE 10 ML: 5 INJECTION INTRAVENOUS at 12:11

## 2023-11-13 RX ADMIN — SODIUM CHLORIDE, PRESERVATIVE FREE 10 ML: 5 INJECTION INTRAVENOUS at 05:11

## 2023-11-13 RX ADMIN — HYDROCODONE BITARTRATE AND ACETAMINOPHEN 1 TABLET: 7.5; 325 TABLET ORAL at 09:11

## 2023-11-13 RX ADMIN — PIPERACILLIN AND TAZOBACTAM 4.5 G: 4; .5 INJECTION, POWDER, LYOPHILIZED, FOR SOLUTION INTRAVENOUS; PARENTERAL at 11:11

## 2023-11-13 RX ADMIN — SODIUM CHLORIDE, PRESERVATIVE FREE 10 ML: 5 INJECTION INTRAVENOUS at 06:11

## 2023-11-13 RX ADMIN — AMLODIPINE BESYLATE 5 MG: 5 TABLET ORAL at 09:11

## 2023-11-13 RX ADMIN — PIPERACILLIN AND TAZOBACTAM 4.5 G: 4; .5 INJECTION, POWDER, LYOPHILIZED, FOR SOLUTION INTRAVENOUS; PARENTERAL at 08:11

## 2023-11-13 RX ADMIN — TAMSULOSIN HYDROCHLORIDE 0.4 MG: 0.4 CAPSULE ORAL at 09:11

## 2023-11-13 RX ADMIN — POLYETHYLENE GLYCOL 3350 17 G: 17 POWDER, FOR SOLUTION ORAL at 09:11

## 2023-11-13 RX ADMIN — ATORVASTATIN CALCIUM 10 MG: 10 TABLET, FILM COATED ORAL at 08:11

## 2023-11-13 RX ADMIN — TENOFOVIR ALAFENAMIDE 25 MG: 25 TABLET ORAL at 09:11

## 2023-11-13 RX ADMIN — METOPROLOL TARTRATE 25 MG: 25 TABLET, FILM COATED ORAL at 09:11

## 2023-11-13 NOTE — PROGRESS NOTES
[facility] - 8th Floor Med Surg  Colon & Rectal Surgery  Progress Note    Subjective:     Interval History: NAEO. Afebrile.  Pain controlled.  Had multiple bowel movements yesterday, she had no control of her bowel movements.       Medications:  Continuous Infusions:   sodium chloride 0.9% 50 mL/hr at 11/12/23 1531     Scheduled Meds:   amLODIPine  5 mg Oral Daily    atorvastatin  10 mg Oral QHS    metoprolol tartrate  25 mg Oral BID    piperacillin-tazobactam (Zosyn) IV (PEDS and ADULTS) (extended infusion is not appropriate)  4.5 g Intravenous Q8H    polyethylene glycol  17 g Oral Daily    sodium chloride 0.9%  10 mL Intravenous Q6H    tamsulosin  0.4 mg Oral Daily    tenofovir alafenamide  25 mg Oral Daily     PRN Meds:acetaminophen, acetaminophen, albuterol, dextrose 10%, dextrose 10%, glucagon (human recombinant), glucose, glucose, HYDROcodone-acetaminophen, insulin aspart U-100, LIDOcaine, morphine, naloxone, ondansetron, prochlorperazine, sodium chloride 0.9%, Flushing PICC/Midline Protocol **AND** sodium chloride 0.9% **AND** sodium chloride 0.9%     Objective:     Vital Signs (Most Recent):  Temp: 98.7 °F (37.1 °C) (11/13/23 0746)  Pulse: 70 (11/13/23 0746)  Resp: 20 (11/13/23 0746)  BP: 122/62 (11/13/23 0746)  SpO2: 98 % (11/13/23 0746) Vital Signs (24h Range):  Temp:  [98.2 °F (36.8 °C)-98.7 °F (37.1 °C)] 98.7 °F (37.1 °C)  Pulse:  [66-74] 70  Resp:  [16-20] 20  SpO2:  [98 %-100 %] 98 %  BP: (118-147)/(54-74) 122/62       Intake/Output Summary (Last 24 hours) at 11/13/2023 2724  Last data filed at 11/13/2023 0624  Gross per 24 hour   Intake 1468.02 ml   Output 1850 ml   Net -381.98 ml       Gen: NAD  HEENT: PERRLA  Chest: Equal chest rise, bilateral breath sounds  Abdomen: soft, appropriately tender to palpation, non-distended.  Anus:  Serosanguineous fluid on dressing.  No purulence noted.  Extremities: Warm, well perfused.      Assessment/Plan:   83-year-old female with a past medical  history of chronic hepatitis-B, type 2 diabetes, hypertension, hyperlipidemia, osteoarthritis, history of stroke, CKD stage 3 who presented to the hospital with necrotic hemorrhoids as well as a perirectal abscess, she underwent an exam under anesthesia with excisional hemorrhoidectomy, 2 columns with drainage of perirectal abscess.  Of note at the operation she was found to have obliteration of half of her sphincter complex from the abscess.      She is at high risk for fecal incontinence given destruction of her sphincter complex from the infection.  We will continue intravenous antibiotics   Ambulate t.i.d.   Scheduled MiraLax.    Nasim Munoz MD  General Surgery [facility]

## 2023-11-13 NOTE — PLAN OF CARE
Problem: Adult Inpatient Plan of Care  Goal: Patient-Specific Goal (Individualized)  Outcome: Ongoing, Progressing  Goal: Absence of Hospital-Acquired Illness or Injury  Outcome: Ongoing, Progressing  Goal: Optimal Comfort and Wellbeing  Outcome: Ongoing, Progressing  Goal: Readiness for Transition of Care  Outcome: Ongoing, Progressing     Problem: Diabetes Comorbidity  Goal: Blood Glucose Level Within Targeted Range  Outcome: Ongoing, Progressing     Problem: Fall Injury Risk  Goal: Absence of Fall and Fall-Related Injury  Outcome: Ongoing, Progressing     Problem: Skin Injury Risk Increased  Goal: Skin Health and Integrity  Outcome: Ongoing, Progressing     Problem: Infection  Goal: Absence of Infection Signs and Symptoms  Outcome: Ongoing, Progressing

## 2023-11-14 ENCOUNTER — ANESTHESIA EVENT (OUTPATIENT)
Dept: SURGERY | Facility: HOSPITAL | Age: 83
DRG: 345 | End: 2023-11-14
Payer: MEDICARE

## 2023-11-14 ENCOUNTER — ANESTHESIA (OUTPATIENT)
Dept: SURGERY | Facility: HOSPITAL | Age: 83
DRG: 345 | End: 2023-11-14
Payer: MEDICARE

## 2023-11-14 LAB
ABORH RETYPE: NORMAL
BACTERIA BLD CULT: NORMAL
BACTERIA BLD CULT: NORMAL
BASOPHILS # BLD AUTO: 0.03 X10(3)/MCL
BASOPHILS NFR BLD AUTO: 0.3 %
EOSINOPHIL # BLD AUTO: 0.02 X10(3)/MCL (ref 0–0.9)
EOSINOPHIL NFR BLD AUTO: 0.2 %
ERYTHROCYTE [DISTWIDTH] IN BLOOD BY AUTOMATED COUNT: 14 % (ref 11.5–17)
GROUP & RH: NORMAL
HCT VFR BLD AUTO: 31.5 % (ref 37–47)
HGB BLD-MCNC: 10.2 G/DL (ref 12–16)
IMM GRANULOCYTES # BLD AUTO: 0.07 X10(3)/MCL (ref 0–0.04)
IMM GRANULOCYTES NFR BLD AUTO: 0.6 %
INDIRECT COOMBS GEL: NORMAL
LYMPHOCYTES # BLD AUTO: 0.54 X10(3)/MCL (ref 0.6–4.6)
LYMPHOCYTES NFR BLD AUTO: 4.5 %
MCH RBC QN AUTO: 29.3 PG (ref 27–31)
MCHC RBC AUTO-ENTMCNC: 32.4 G/DL (ref 33–36)
MCV RBC AUTO: 90.5 FL (ref 80–94)
MONOCYTES # BLD AUTO: 0.18 X10(3)/MCL (ref 0.1–1.3)
MONOCYTES NFR BLD AUTO: 1.5 %
NEUTROPHILS # BLD AUTO: 11.11 X10(3)/MCL (ref 2.1–9.2)
NEUTROPHILS NFR BLD AUTO: 92.9 %
NRBC BLD AUTO-RTO: 0 %
PLATELET # BLD AUTO: 265 X10(3)/MCL (ref 130–400)
PMV BLD AUTO: 10.8 FL (ref 7.4–10.4)
POCT GLUCOSE: 103 MG/DL (ref 70–110)
POCT GLUCOSE: 123 MG/DL (ref 70–110)
RBC # BLD AUTO: 3.48 X10(6)/MCL (ref 4.2–5.4)
SPECIMEN OUTDATE: NORMAL
WBC # SPEC AUTO: 11.95 X10(3)/MCL (ref 4.5–11.5)

## 2023-11-14 PROCEDURE — 97530 THERAPEUTIC ACTIVITIES: CPT | Mod: CQ

## 2023-11-14 PROCEDURE — 25000003 PHARM REV CODE 250: Performed by: INTERNAL MEDICINE

## 2023-11-14 PROCEDURE — 85025 COMPLETE CBC W/AUTO DIFF WBC: CPT | Performed by: COLON & RECTAL SURGERY

## 2023-11-14 PROCEDURE — 45990 PR SURG DIAGNOSTIC EXAM, ANORECTAL: ICD-10-PCS | Mod: 58,,, | Performed by: COLON & RECTAL SURGERY

## 2023-11-14 PROCEDURE — D9220A PRA ANESTHESIA: Mod: CRNA,,, | Performed by: NURSE ANESTHETIST, CERTIFIED REGISTERED

## 2023-11-14 PROCEDURE — 97535 SELF CARE MNGMENT TRAINING: CPT | Mod: CO

## 2023-11-14 PROCEDURE — 45990 SURG DX EXAM ANORECTAL: CPT | Mod: 58,,, | Performed by: COLON & RECTAL SURGERY

## 2023-11-14 PROCEDURE — 36000704 HC OR TIME LEV I 1ST 15 MIN: Performed by: COLON & RECTAL SURGERY

## 2023-11-14 PROCEDURE — 63600175 PHARM REV CODE 636 W HCPCS: Performed by: INTERNAL MEDICINE

## 2023-11-14 PROCEDURE — 37000008 HC ANESTHESIA 1ST 15 MINUTES: Performed by: COLON & RECTAL SURGERY

## 2023-11-14 PROCEDURE — 71000033 HC RECOVERY, INTIAL HOUR: Performed by: COLON & RECTAL SURGERY

## 2023-11-14 PROCEDURE — 11000001 HC ACUTE MED/SURG PRIVATE ROOM

## 2023-11-14 PROCEDURE — 86900 BLOOD TYPING SEROLOGIC ABO: CPT | Performed by: COLON & RECTAL SURGERY

## 2023-11-14 PROCEDURE — D9220A PRA ANESTHESIA: ICD-10-PCS | Mod: ANES,,, | Performed by: ANESTHESIOLOGY

## 2023-11-14 PROCEDURE — A4216 STERILE WATER/SALINE, 10 ML: HCPCS | Performed by: INTERNAL MEDICINE

## 2023-11-14 PROCEDURE — 25000003 PHARM REV CODE 250: Performed by: COLON & RECTAL SURGERY

## 2023-11-14 PROCEDURE — 25000003 PHARM REV CODE 250: Performed by: NURSE ANESTHETIST, CERTIFIED REGISTERED

## 2023-11-14 PROCEDURE — D9220A PRA ANESTHESIA: ICD-10-PCS | Mod: CRNA,,, | Performed by: NURSE ANESTHETIST, CERTIFIED REGISTERED

## 2023-11-14 PROCEDURE — 63600175 PHARM REV CODE 636 W HCPCS: Performed by: COLON & RECTAL SURGERY

## 2023-11-14 PROCEDURE — 63600175 PHARM REV CODE 636 W HCPCS: Performed by: NURSE ANESTHETIST, CERTIFIED REGISTERED

## 2023-11-14 PROCEDURE — 36000705 HC OR TIME LEV I EA ADD 15 MIN: Performed by: COLON & RECTAL SURGERY

## 2023-11-14 PROCEDURE — D9220A PRA ANESTHESIA: Mod: ANES,,, | Performed by: ANESTHESIOLOGY

## 2023-11-14 PROCEDURE — 37000009 HC ANESTHESIA EA ADD 15 MINS: Performed by: COLON & RECTAL SURGERY

## 2023-11-14 PROCEDURE — 97116 GAIT TRAINING THERAPY: CPT | Mod: CQ

## 2023-11-14 RX ORDER — SODIUM CHLORIDE, SODIUM GLUCONATE, SODIUM ACETATE, POTASSIUM CHLORIDE AND MAGNESIUM CHLORIDE 30; 37; 368; 526; 502 MG/100ML; MG/100ML; MG/100ML; MG/100ML; MG/100ML
INJECTION, SOLUTION INTRAVENOUS CONTINUOUS
Status: CANCELLED | OUTPATIENT
Start: 2023-11-14 | End: 2023-12-14

## 2023-11-14 RX ORDER — METRONIDAZOLE 500 MG/1
500 TABLET ORAL EVERY 8 HOURS
Status: DISCONTINUED | OUTPATIENT
Start: 2023-11-14 | End: 2023-11-17 | Stop reason: HOSPADM

## 2023-11-14 RX ORDER — POLYETHYLENE GLYCOL 3350 17 G/17G
17 POWDER, FOR SOLUTION ORAL 2 TIMES DAILY
Status: DISCONTINUED | OUTPATIENT
Start: 2023-11-14 | End: 2023-11-16

## 2023-11-14 RX ORDER — LIDOCAINE HYDROCHLORIDE 20 MG/ML
INJECTION, SOLUTION EPIDURAL; INFILTRATION; INTRACAUDAL; PERINEURAL
Status: DISCONTINUED | OUTPATIENT
Start: 2023-11-14 | End: 2023-11-14

## 2023-11-14 RX ORDER — FENTANYL CITRATE 50 UG/ML
INJECTION, SOLUTION INTRAMUSCULAR; INTRAVENOUS
Status: DISCONTINUED | OUTPATIENT
Start: 2023-11-14 | End: 2023-11-14

## 2023-11-14 RX ORDER — ONDANSETRON 2 MG/ML
INJECTION INTRAMUSCULAR; INTRAVENOUS
Status: DISCONTINUED | OUTPATIENT
Start: 2023-11-14 | End: 2023-11-14

## 2023-11-14 RX ORDER — ROCURONIUM BROMIDE 10 MG/ML
INJECTION, SOLUTION INTRAVENOUS
Status: DISCONTINUED | OUTPATIENT
Start: 2023-11-14 | End: 2023-11-14

## 2023-11-14 RX ORDER — LIDOCAINE HYDROCHLORIDE 10 MG/ML
1 INJECTION, SOLUTION EPIDURAL; INFILTRATION; INTRACAUDAL; PERINEURAL ONCE
Status: CANCELLED | OUTPATIENT
Start: 2023-11-14 | End: 2023-11-14

## 2023-11-14 RX ORDER — PROPOFOL 10 MG/ML
VIAL (ML) INTRAVENOUS
Status: DISCONTINUED | OUTPATIENT
Start: 2023-11-14 | End: 2023-11-14

## 2023-11-14 RX ORDER — CIPROFLOXACIN 500 MG/1
500 TABLET ORAL EVERY 12 HOURS
Status: DISCONTINUED | OUTPATIENT
Start: 2023-11-14 | End: 2023-11-17 | Stop reason: HOSPADM

## 2023-11-14 RX ORDER — DEXAMETHASONE SODIUM PHOSPHATE 4 MG/ML
INJECTION, SOLUTION INTRA-ARTICULAR; INTRALESIONAL; INTRAMUSCULAR; INTRAVENOUS; SOFT TISSUE
Status: DISCONTINUED | OUTPATIENT
Start: 2023-11-14 | End: 2023-11-14

## 2023-11-14 RX ADMIN — DEXAMETHASONE SODIUM PHOSPHATE 4 MG: 4 INJECTION, SOLUTION INTRA-ARTICULAR; INTRALESIONAL; INTRAMUSCULAR; INTRAVENOUS; SOFT TISSUE at 01:11

## 2023-11-14 RX ADMIN — METOPROLOL TARTRATE 25 MG: 25 TABLET, FILM COATED ORAL at 08:11

## 2023-11-14 RX ADMIN — PIPERACILLIN AND TAZOBACTAM 4.5 G: 4; .5 INJECTION, POWDER, LYOPHILIZED, FOR SOLUTION INTRAVENOUS; PARENTERAL at 03:11

## 2023-11-14 RX ADMIN — MORPHINE SULFATE 2 MG: 4 INJECTION, SOLUTION INTRAMUSCULAR; INTRAVENOUS at 03:11

## 2023-11-14 RX ADMIN — HYDROCODONE BITARTRATE AND ACETAMINOPHEN 1 TABLET: 7.5; 325 TABLET ORAL at 08:11

## 2023-11-14 RX ADMIN — CIPROFLOXACIN HYDROCHLORIDE 500 MG: 500 TABLET, FILM COATED ORAL at 08:11

## 2023-11-14 RX ADMIN — LIDOCAINE HYDROCHLORIDE 80 MG: 20 INJECTION, SOLUTION EPIDURAL; INFILTRATION; INTRACAUDAL; PERINEURAL at 01:11

## 2023-11-14 RX ADMIN — FENTANYL CITRATE 100 MCG: 50 INJECTION, SOLUTION INTRAMUSCULAR; INTRAVENOUS at 01:11

## 2023-11-14 RX ADMIN — PIPERACILLIN AND TAZOBACTAM 4.5 G: 4; .5 INJECTION, POWDER, LYOPHILIZED, FOR SOLUTION INTRAVENOUS; PARENTERAL at 12:11

## 2023-11-14 RX ADMIN — METRONIDAZOLE 500 MG: 500 TABLET ORAL at 09:11

## 2023-11-14 RX ADMIN — ATORVASTATIN CALCIUM 10 MG: 10 TABLET, FILM COATED ORAL at 08:11

## 2023-11-14 RX ADMIN — ONDANSETRON 4 MG: 2 INJECTION INTRAMUSCULAR; INTRAVENOUS at 02:11

## 2023-11-14 RX ADMIN — SODIUM CHLORIDE, PRESERVATIVE FREE 10 ML: 5 INJECTION INTRAVENOUS at 06:11

## 2023-11-14 RX ADMIN — AMLODIPINE BESYLATE 5 MG: 5 TABLET ORAL at 03:11

## 2023-11-14 RX ADMIN — MORPHINE SULFATE 2 MG: 4 INJECTION, SOLUTION INTRAMUSCULAR; INTRAVENOUS at 11:11

## 2023-11-14 RX ADMIN — SODIUM CHLORIDE: 9 INJECTION, SOLUTION INTRAVENOUS at 08:11

## 2023-11-14 RX ADMIN — PROPOFOL 150 MG: 10 INJECTION, EMULSION INTRAVENOUS at 01:11

## 2023-11-14 RX ADMIN — ROCURONIUM BROMIDE 50 MG: 10 SOLUTION INTRAVENOUS at 01:11

## 2023-11-14 RX ADMIN — SUGAMMADEX 200 MG: 100 INJECTION, SOLUTION INTRAVENOUS at 02:11

## 2023-11-14 RX ADMIN — SODIUM CHLORIDE, PRESERVATIVE FREE 10 ML: 5 INJECTION INTRAVENOUS at 05:11

## 2023-11-14 RX ADMIN — SODIUM CHLORIDE, PRESERVATIVE FREE 10 ML: 5 INJECTION INTRAVENOUS at 12:11

## 2023-11-14 RX ADMIN — SODIUM CHLORIDE, SODIUM GLUCONATE, SODIUM ACETATE, POTASSIUM CHLORIDE AND MAGNESIUM CHLORIDE: 526; 502; 368; 37; 30 INJECTION, SOLUTION INTRAVENOUS at 01:11

## 2023-11-14 RX ADMIN — SODIUM CHLORIDE, PRESERVATIVE FREE 10 ML: 5 INJECTION INTRAVENOUS at 11:11

## 2023-11-14 NOTE — PROGRESS NOTES
[facility] Hospital Medicine Progress Note        Chief Complaint: Inpatient Follow-up for Hemorrhoids (Pt reports pov  from Dr. Garcia's office with reports of necrotic hemorrhoids x2 weeks. Sent here by MD to be admitted through the ED. Pt reports taking OTC meds with no relief. Denies bleeding, but does report frequent bowel movements. )    HPI: 84 yo female with PMHx as mentioned below was sent to the ED after pt was seen by  Dr. Garcia in the office today for further evaluation of necrotic hemorrhoids. History is significant for recent admission in 10/2023 for hematochezia and pt underwent colonoscopy on 10/21/23 which revealed multiple diverticula, sigmoid colon polyp and hypertrophied anal papilla. Following colonoscopy pt developed a painful hemorrhoid and was seen by General surgery suggested conservative management. Pt reports in the last few days hemorrhoid became more painful and now it is hanging from rectum with active foul smell brownish discharge. Pt denies associated fever , chills, night sweats or other symptoms. On arrival she was afebrile, mod hypertensive with normal HR and RR. O2 sat 99% on RA. Labs showed normal WBC, Hgb 9.2, Cr 1.3, LA 1.0. CT pelvis showed inflammatory changes surround the rectum and anus with gas and minimal fluid within the right gluteal cleft measuring 3.6 cm extending somewhat superiorly on the left.  On exam, swelling, erythema with active pus discharge noted from anus. Blood cultures were obtained and pt initiated on Zosyn. Dr. Garcia is planning for EUA in am     Interval Hx:     11-10-23 Dr. Thomas-patient is status post I and D debridement of perirectal abscess as well as excision of 2 external hemorrhoids by Dr. Garcia with Colorectal surgery      11/11/2023 Dr. Thomas-chart reviewed patient examined.  Patient voices no complaints.  She continues on Zosyn.  She is status post excision 2 necrotic external hemorrhoids as well as I&D  of perirectal abscess.  Pain fairly controlled    11/12/23- delays asking for pain control/ bowel incontinent    11/13/2023 Dr. Thomas-chart reviewed patient examined.  Complaining of pains to rectum with BMs.  Surgical hospitalist added MiraLax scheduled.  Still patient looks good    Case was discussed with patient's nurse and  on the floor.      Objective/physical exam:  General appearance:  Elderly female chronically ill-appearing; nontoxic reports of rectal pain     HENT: Atraumatic head. Moist mucous membranes of oral cavity.    Eyes: PERRL    Lungs: Clear to auscultation bilaterally. No wheezing present.     Heart: Regular rate and rhythm. S1 and S2 present cap refill brisk    Abdomen: Soft, non-distended, non-tender. No rebound tenderness/guarding.  Hypoactive bowel sounds     Extremities: No cyanosis, clubbing, atraumatic    Skin: warm and dry    Neuro:Oriented x 3, no focal deficits    Psych/mental status: Appropriate mood and affect., cooperative    VITAL SIGNS: 24 HRS MIN & MAX LAST   Temp  Min: 98 °F (36.7 °C)  Max: 98.7 °F (37.1 °C) 98 °F (36.7 °C)   BP  Min: 118/54  Max: 147/63 (!) 143/70   Pulse  Min: 62  Max: 72  66   Resp  Min: 16  Max: 20 20   SpO2  Min: 98 %  Max: 99 % 98 %     I have reviewed the following labs:  Recent Labs   Lab 11/10/23  0448 11/11/23  0440 11/12/23  0453   WBC 7.22 11.09 7.41   RBC 3.16* 3.01* 2.81*   HGB 9.0* 8.7* 8.1*   HCT 28.7* 27.0* 25.8*   MCV 90.8 89.7 91.8   MCH 28.5 28.9 28.8   MCHC 31.4* 32.2* 31.4*   RDW 13.9 13.9 13.9    330 302   MPV 10.9* 10.6* 10.6*       Recent Labs   Lab 11/09/23  1138 11/10/23  0448 11/11/23  0440 11/12/23  0453 11/13/23  0639    138 134* 134* 135*   K 4.2 4.4 4.6 4.3 4.6   CO2 24 25 24 22* 22*   BUN 13.3 11.9 15.6 17.7 14.9   CREATININE 1.30* 1.45* 1.71* 1.52* 1.40*   CALCIUM 8.9 9.1 8.5 8.0* 8.4   MG  --   --  2.10 2.00  --    ALBUMIN 3.4 3.1* 2.9*  --   --    ALKPHOS 71 61 58  --   --    ALT 10 8 7  --   --    AST  14 14 11  --   --    BILITOT 0.7 0.9 0.7  --   --        Microbiology Results (last 7 days)       Procedure Component Value Units Date/Time    Blood culture #1 **CANNOT BE ORDERED STAT** [[id number]]  (Normal) Collected: 11/09/23 1138    Order Status: Completed Specimen: Blood Updated: 11/13/23 1301     CULTURE, BLOOD ([facility]) No Growth At 96 Hours    Blood culture #2 **CANNOT BE ORDERED STAT** [[id number]]  (Normal) Collected: 11/09/23 1138    Order Status: Completed Specimen: Blood Updated: 11/13/23 1201     CULTURE, BLOOD ([facility]) No Growth At 96 Hours    Wound Culture [[id number]]  (Abnormal)  (Susceptibility) Collected: 11/09/23 1129    Order Status: Completed Specimen: Drainage from Anus Updated: 11/13/23 1124     Wound Culture Many Morganella morganii      Many Escherichia coli      Many Enterococcus faecalis      Moderate Pseudomonas aeruginosa             See below for Radiology    Scheduled Med:   amLODIPine  5 mg Oral Daily    atorvastatin  10 mg Oral QHS    metoprolol tartrate  25 mg Oral BID    piperacillin-tazobactam (Zosyn) IV (PEDS and ADULTS) (extended infusion is not appropriate)  4.5 g Intravenous Q8H    polyethylene glycol  17 g Oral Daily    sodium chloride 0.9%  10 mL Intravenous Q6H    tamsulosin  0.4 mg Oral Daily    tenofovir alafenamide  25 mg Oral Daily      Continuous Infusions:   sodium chloride 0.9% 50 mL/hr at 11/12/23 1531      PRN Meds:  acetaminophen, acetaminophen, albuterol, dextrose 10%, dextrose 10%, glucagon (human recombinant), glucose, glucose, HYDROcodone-acetaminophen, insulin aspart U-100, LIDOcaine, morphine, naloxone, ondansetron, prochlorperazine, sodium chloride 0.9%, Flushing PICC/Midline Protocol **AND** sodium chloride 0.9% **AND** sodium chloride 0.9%     Assessment/Plan:      Thrombosed hemorrhoids/ perirectal abscess post I and D debridement of perirectal abscess as well as excision of 2 external hemorrhoids   - bowel incontinent secondary to  oblterated sphincter from  infection  -polymicrobial infection  -continue Zosyn  -schedule MiraLax    Acute on chronic rectal pain- POA  -improving    Acute on chronic kidney disease- POA  -stable    Anemia- chronic disease- POA    DM type II with hyperglycemia- POA  - controlled    Weakness- POA      Plan- continue zosyn/bowel program/hydration/am labs /normal saline solution at 75 cc       History of chronic hepatitis-B on Vemlidy, HLD, TIA on aspirin, osteoarthritis     VTE prophylaxis:     Patient condition:  Stable/Fair/Guarded/ Serious/ Critical    Anticipated discharge and Disposition:   home      All diagnosis and differential diagnosis have been reviewed; assessment and plan has been documented; I have personally reviewed the labs and test results that are presently available; I have reviewed the patients medication list; I have reviewed the consulting providers response and recommendations. I have reviewed or attempted to review medical records based upon their availability    All of the patient's questions have been  addressed and answered. Patient's is agreeable to the above stated plan. I will continue to monitor closely and make adjustments to medical management as needed.  _____________________________________________________________________    Nutrition Status:    Radiology:  I have personally reviewed the following imaging and agree with the radiologist.     CT Pelvis With IV Contrast  Narrative: CLINICAL HISTORY:  Trauma.    TECHNIQUE:  Maxillofacial CT was performed without  contrast. There are sagittal and coronal reconstructed images available for review.    Automatic exposure control was utilized to reduce the patient's radiation dose.    DLP = 526    COMPARISON:  No prior imaging available for comparison.    FINDINGS:  Inflammatory changes surround the rectum and anus with gas and minimal fluid noted within the right gluteal cleft measuring 3.6 cm.  This extends somewhat superiorly on the left (series 2, image 72).   Findings concerning for perirectal abscess or possible fistula.    Diverticulosis without evidence of diverticulitis.  Anti dependent air is noted within the bladder.  Correlate for recent catheterization.  No acute osseous.  Impression: Inflammatory changes surround the rectum and anus with gas and minimal fluid noted within the right gluteal cleft measuring 3.6 cm. This extends somewhat superiorly on the left (series 2, image 72).    Electronically signed by: Rory Zambrano  Date:    11/09/2023  Time:    13:38      Markus Thomas MD   11/13/2023

## 2023-11-14 NOTE — PT/OT/SLP PROGRESS
Occupational Therapy   Treatment    Name: Tonie Tejada  MRN: [id number]    Recommendations:     Recommended therapy intensity at discharge: Moderate Intensity Therapy   Discharge Equipment Recommendations:  walker, rolling   Barriers to discharge:       Assessment:     Tonie Tejada is a 83 y.o. female with a medical diagnosis of gangrene of hemorrhoid. Performance deficits affecting function are weakness, impaired endurance, impaired self care skills, impaired functional mobility, decreased lower extremity function, gait instability, impaired balance, decreased safety awareness, pain. Tolerated session well however limited by pain. LLE noted to buckle multiple times during mobility. Would benefit from moderate intensity therapy at d/c to improve overall function and independence before return home.      Rehab Prognosis:  Good; patient would benefit from acute skilled OT services to address these deficits and reach maximum level of function.       Plan:     Patient to be seen 3 x/week to address the above listed problems via self-care/home management, therapeutic activities, therapeutic exercises  Plan of Care Expires: 12/08/23  Plan of Care Reviewed with: patient, daughter    Subjective     Pain/Comfort:  Pain Rating 1: 0/10    Objective:     Communicated with: RN prior to session.  Patient found supine with   upon OT entry to room.    General Precautions: Standard, fall    Orthopedic Precautions:N/A  Braces: N/A  Respiratory Status: Room air     Occupational Performance:     Bed Mobility:    Patient completed Supine to Sit with minimum assistance  Patient completed Sit to Supine with contact guard assistance     Functional Mobility/Transfers:  Patient completed Sit <> Stand Transfer with minimum assistance  with  rolling walker     Activities of Daily Living:  Grooming: pt completed oral hygiene standing at sink with Min-Mod A for standing balance during task. LLE noted to buckle multiple times.      Therapeutic Positioning    OT interventions performed during the course of today's session in an effort to prevent and/or reduce acquired pressure injuries:   Education was provided on benefits of and recommendations for therapeutic positioning    Skin assessment: all bony prominences were assessed    Findings: no redness or breakdown noted    [facility] 6 Click ADL:      Patient Education:  Patient and family were provided with verbal education education regarding OT role/goals/POC, fall prevention, safety awareness, and Discharge/DME recommendations.  Understanding was verbalized, however additional teaching warranted.      Patient left supine with all lines intact, call button in reach, and daughter present    GOALS:   Multidisciplinary Problems       Occupational Therapy Goals          Problem: Occupational Therapy    Goal Priority Disciplines Outcome Interventions   Occupational Therapy Goal     OT, PT/OT Ongoing, Progressing    Description: LTG: Pt will perform basic ADLs and ADL transfers with Modified independence using LRAD by discharge.    STG: to be met by 12/8    Pt will complete grooming standing at sink with LRAD with SBA.  Pt will complete UB dressing with SBA.  Pt will complete LB dressing with SBA using LRAD.  Pt will complete toileting with SBA using LRAD.  Pt will complete functional mobility to/from toilet and toilet transfer with SBA using LRAD.                        Time Tracking:     OT Date of Treatment: 11/14/23  OT Start Time: 0959  OT Stop Time: 1022  OT Total Time (min): 23 min    Billable Minutes:Self Care/Home Management 23    OT/PIERO: PIERO     Number of PIERO visits since last OT visit: 1 11/14/2023

## 2023-11-14 NOTE — ANESTHESIA PROCEDURE NOTES
Intubation    Date/Time: 11/14/2023 1:36 PM    Performed by: Tu Agauyo CRNA  Authorized by: Tu Aguayo CRNA    Intubation:     Induction:  Intravenous    Intubated:  Postinduction    Mask Ventilation:  Easy with oral airway    Attempts:  2    Attempted By:  CRNA and student    Method of Intubation:  Direct    Blade:  Santos 2    Laryngeal View Grade: Grade IIA - cords partially seen      Difficult Airway Encountered?: No      Complications:  None    Airway Device:  Oral endotracheal tube    Airway Device Size:  7.5    Style/Cuff Inflation:  Cuffed    Tube secured:  21    Secured at:  The lips    Placement Verified By:  Capnometry    Complicating Factors:  None    Findings Post-Intubation:  BS equal bilateral

## 2023-11-14 NOTE — PROGRESS NOTES
[facility] - 8th Floor Med Surg  Colon & Rectal Surgery  Progress Note    Subjective:     Interval History:  No acute events overnight.  Afebrile.  Pain controlled.  Did have some loose bowel movements yesterday.  According to the daughter she was incontinent of these bowel movements.     Medications:  Continuous Infusions:   sodium chloride 0.9% 50 mL/hr at 11/12/23 1531     Scheduled Meds:   amLODIPine  5 mg Oral Daily    atorvastatin  10 mg Oral QHS    metoprolol tartrate  25 mg Oral BID    piperacillin-tazobactam (Zosyn) IV (PEDS and ADULTS) (extended infusion is not appropriate)  4.5 g Intravenous Q8H    polyethylene glycol  17 g Oral Daily    sodium chloride 0.9%  10 mL Intravenous Q6H    tamsulosin  0.4 mg Oral Daily    tenofovir alafenamide  25 mg Oral Daily     PRN Meds:acetaminophen, acetaminophen, albuterol, dextrose 10%, dextrose 10%, glucagon (human recombinant), glucose, glucose, HYDROcodone-acetaminophen, insulin aspart U-100, LIDOcaine, morphine, naloxone, ondansetron, prochlorperazine, sodium chloride 0.9%, Flushing PICC/Midline Protocol **AND** sodium chloride 0.9% **AND** sodium chloride 0.9%     Objective:     Vital Signs (Most Recent):  Temp: 98.2 °F (36.8 °C) (11/14/23 0341)  Pulse: 64 (11/14/23 0420)  Resp: 18 (11/14/23 0347)  BP: (!) 147/61 (11/14/23 0420)  SpO2: 95 % (11/14/23 0341) Vital Signs (24h Range):  Temp:  [98 °F (36.7 °C)-98.7 °F (37.1 °C)] 98.2 °F (36.8 °C)  Pulse:  [62-70] 64  Resp:  [16-20] 18  SpO2:  [95 %-98 %] 95 %  BP: (122-161)/(61-76) 147/61       Intake/Output Summary (Last 24 hours) at 11/14/2023 0707  Last data filed at 11/14/2023 0618  Gross per 24 hour   Intake 426.48 ml   Output 2700 ml   Net -2273.52 ml       Gen: NAD  HEENT: PERRLA  Chest: Equal chest rise, bilateral breath sounds  Abdomen: soft, appropriately tender to palpation, non-distended.  Anus:  Serosanguineous fluid on dressing.  No purulence noted.  Extremities: Warm, well  perfused.      Assessment/Plan:   83-year-old female with a past medical history of chronic hepatitis-B, type 2 diabetes, hypertension, hyperlipidemia, osteoarthritis, history of stroke, CKD stage 3 who presented to the hospital with necrotic hemorrhoids as well as a perirectal abscess, she underwent an exam under anesthesia with excisional hemorrhoidectomy, 2 columns with drainage of perirectal abscess.  Of note at the operation she was found to have obliteration of half of her sphincter complex from the abscess.      Returned to the operating room today for exam under anesthesia   Continue IV antibiotics for now   NPO, IV fluids    Nasim Munoz MD  General Surgery [facility]

## 2023-11-14 NOTE — OP NOTE
[facility] - Periop Services  Operative Note      Date of Procedure: 11/14/2023     Procedure: Exam under anesthesia     Surgeon(s) and Role:     * Jamie Garcia MD - Primary    Assisting Surgeon: None    Pre-Operative Diagnosis: Gangrene of hemorrhoid [K64.8, I96]    Post-Operative Diagnosis: Same    Anesthesia: General    Estimated Blood Loss (EBL): Less than 10 mL           Specimens: None     Description of Technical Procedures:  After informed consent was obtained, patient was brought to the operating room.  General endotracheal anesthesia was administered by member of the anesthesia team.  Patient was placed in the prone carli-knife position.  The perianal skin was prepped and draped in sterile surgical fashion.  A medium Hill-Diamond retractor was inserted in all 4 quadrants inspected.  There was no evidence of ongoing infection.  There was no purulence or necrotic tissue necessitating debridement/excision.  Unfortunately there was only approximately 30% of normal mucosa anteriorly extending around the left side.  Rectum was irrigated and hemostasis was achieved with spot cautery.  Marcaine 0.25% was used for local anesthesia.  A dry gauze dressing was applied and secured with tape.  Patient tolerated the procedure well and there were no complications.  She was returned to the supine position.  She was awakened and extubated in the operating room then subsequently transferred to recovery in satisfactory condition.

## 2023-11-14 NOTE — TRANSFER OF CARE
"Anesthesia Transfer of Care Note    Patient: Tonie Tejada    Procedure(s) Performed: Procedure(s) (LRB):  EXAM UNDER ANESTHESIA, DIGITAL, RECTUM (N/A)    Patient location: PACU    Anesthesia Type: general    Transport from OR: Transported from OR on room air with adequate spontaneous ventilation    Post pain: adequate analgesia    Post assessment: no apparent anesthetic complications    Post vital signs: stable    Level of consciousness: sedated    Nausea/Vomiting: no nausea/vomiting    Complications: none    Transfer of care protocol was followed      Last vitals: Visit Vitals  BP (!) 171/74   Pulse 72   Temp 36.8 °C (98.3 °F) (Oral)   Resp 20   Ht 5' 2" (1.575 m)   Wt 77.1 kg (170 lb)   SpO2 99%   Breastfeeding No   BMI 31.09 kg/m²     "

## 2023-11-14 NOTE — PROGRESS NOTES
[facility] Hospital Medicine Progress Note        Chief Complaint: Inpatient Follow-up for Hemorrhoids (Pt reports pov  from Dr. Garcia's office with reports of necrotic hemorrhoids x2 weeks. Sent here by MD to be admitted through the ED. Pt reports taking OTC meds with no relief. Denies bleeding, but does report frequent bowel movements. )    HPI: 84 yo female with PMHx as mentioned below was sent to the ED after pt was seen by  Dr. Garcia in the office today for further evaluation of necrotic hemorrhoids. History is significant for recent admission in 10/2023 for hematochezia and pt underwent colonoscopy on 10/21/23 which revealed multiple diverticula, sigmoid colon polyp and hypertrophied anal papilla. Following colonoscopy pt developed a painful hemorrhoid and was seen by General surgery suggested conservative management. Pt reports in the last few days hemorrhoid became more painful and now it is hanging from rectum with active foul smell brownish discharge. Pt denies associated fever , chills, night sweats or other symptoms. On arrival she was afebrile, mod hypertensive with normal HR and RR. O2 sat 99% on RA. Labs showed normal WBC, Hgb 9.2, Cr 1.3, LA 1.0. CT pelvis showed inflammatory changes surround the rectum and anus with gas and minimal fluid within the right gluteal cleft measuring 3.6 cm extending somewhat superiorly on the left.  On exam, swelling, erythema with active pus discharge noted from anus. Blood cultures were obtained and pt initiated on Zosyn. Dr. Garcia is planning for EUA in am     Interval Hx:     11-10-23 Dr. Thomas-patient is status post I and D debridement of perirectal abscess as well as excision of 2 external hemorrhoids by Dr. Garcia with Colorectal surgery      11/11/2023 Dr. Thomas-chart reviewed patient examined.  Patient voices no complaints.  She continues on Zosyn.  She is status post excision 2 necrotic external hemorrhoids as well as I&D  of perirectal abscess.  Pain fairly controlled    11/12/23- delays asking for pain control/ bowel incontinent    11/13/2023 Dr. Thomas-chart reviewed patient examined.  Complaining of pains to rectum with BMs.  Surgical hospitalist added MiraLax scheduled.  Still patient looks good      11/14/2023 Dr. Thomas-patient is status post exam under anesthesia with no signs of infection/area irrigated patient returned to room.  Complaining of pain to rectum, give him medications.  Will increase her MiraLax to twice a day.  Continue antibiotics    Case was discussed with patient's nurse and  on the floor.      Objective/physical exam:  General appearance:  Elderly female chronically ill-appearing; nontoxic reports of rectal pain     HENT: Atraumatic head. Moist mucous membranes of oral cavity.    Eyes: PERRL    Lungs: Clear to auscultation bilaterally. No wheezing present.     Heart: Regular rate and rhythm. S1 and S2 present cap refill brisk    Abdomen: Soft, non-distended, non-tender. No rebound tenderness/guarding.  Hypoactive bowel sounds     Extremities: No cyanosis, clubbing, atraumatic    Skin: warm and dry    Neuro:Oriented x 3, no focal deficits    Psych/mental status: Appropriate mood and affect., cooperative    VITAL SIGNS: 24 HRS MIN & MAX LAST   Temp  Min: 97.5 °F (36.4 °C)  Max: 98.6 °F (37 °C) 98.3 °F (36.8 °C)   BP  Min: 126/104  Max: 183/86 (!) 173/82   Pulse  Min: 64  Max: 87  86   Resp  Min: 16  Max: 20 20   SpO2  Min: 95 %  Max: 99 % 98 %     I have reviewed the following labs:  Recent Labs   Lab 11/10/23  0448 11/11/23  0440 11/12/23  0453   WBC 7.22 11.09 7.41   RBC 3.16* 3.01* 2.81*   HGB 9.0* 8.7* 8.1*   HCT 28.7* 27.0* 25.8*   MCV 90.8 89.7 91.8   MCH 28.5 28.9 28.8   MCHC 31.4* 32.2* 31.4*   RDW 13.9 13.9 13.9    330 302   MPV 10.9* 10.6* 10.6*       Recent Labs   Lab 11/09/23  1138 11/10/23  0448 11/11/23  0440 11/12/23  0453 11/13/23  0639    138 134* 134* 135*   K 4.2 4.4  4.6 4.3 4.6   CO2 24 25 24 22* 22*   BUN 13.3 11.9 15.6 17.7 14.9   CREATININE 1.30* 1.45* 1.71* 1.52* 1.40*   CALCIUM 8.9 9.1 8.5 8.0* 8.4   MG  --   --  2.10 2.00  --    ALBUMIN 3.4 3.1* 2.9*  --   --    ALKPHOS 71 61 58  --   --    ALT 10 8 7  --   --    AST 14 14 11  --   --    BILITOT 0.7 0.9 0.7  --   --        Microbiology Results (last 7 days)       Procedure Component Value Units Date/Time    Blood culture #1 **CANNOT BE ORDERED STAT** [[id number]]  (Normal) Collected: 11/09/23 1138    Order Status: Completed Specimen: Blood Updated: 11/14/23 1302     CULTURE, BLOOD ([facility]) No Growth at 5 days    Blood culture #2 **CANNOT BE ORDERED STAT** [[id number]]  (Normal) Collected: 11/09/23 1138    Order Status: Completed Specimen: Blood Updated: 11/14/23 1302     CULTURE, BLOOD ([facility]) No Growth at 5 days    Wound Culture [[id number]]  (Abnormal)  (Susceptibility) Collected: 11/09/23 1129    Order Status: Completed Specimen: Drainage from Anus Updated: 11/13/23 1124     Wound Culture Many Morganella morganii      Many Escherichia coli      Many Enterococcus faecalis      Moderate Pseudomonas aeruginosa             See below for Radiology    Scheduled Med:   amLODIPine  5 mg Oral Daily    atorvastatin  10 mg Oral QHS    ciprofloxacin HCl  500 mg Oral Q12H    metoprolol tartrate  25 mg Oral BID    metroNIDAZOLE  500 mg Oral Q8H    polyethylene glycol  17 g Oral BID    sodium chloride 0.9%  10 mL Intravenous Q6H    tamsulosin  0.4 mg Oral Daily    tenofovir alafenamide  25 mg Oral Daily      Continuous Infusions:   sodium chloride 0.9% 50 mL/hr at 11/12/23 1531      PRN Meds:  acetaminophen, acetaminophen, albuterol, dextrose 10%, dextrose 10%, glucagon (human recombinant), glucose, glucose, HYDROcodone-acetaminophen, insulin aspart U-100, LIDOcaine, morphine, naloxone, ondansetron, prochlorperazine, sodium chloride 0.9%, Flushing PICC/Midline Protocol **AND** sodium chloride 0.9% **AND** sodium chloride 0.9%      Assessment/Plan:      Thrombosed hemorrhoids/ perirectal abscess post I and D debridement of perirectal abscess as well as excision of 2 external hemorrhoids   - 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  -polymicrobial infection  -continue Zosyn  -schedule MiraLax bid  - pain control     Acute on chronic rectal pain- POA  -improving    Acute on chronic kidney disease- POA  -stable    Anemia- chronic disease- POA    DM type II with hyperglycemia- POA  - controlled    Weakness- POA      Plan- continue zosyn/bowel program/hydration/am labs /normal saline solution at 75 cc       History of chronic hepatitis-B on Vemlidy, HLD, TIA on aspirin, osteoarthritis     VTE prophylaxis: scd    Patient condition:  Stable    Anticipated discharge and Disposition:  tbd      All diagnosis and differential diagnosis have been reviewed; assessment and plan has been documented; I have personally reviewed the labs and test results that are presently available; I have reviewed the patients medication list; I have reviewed the consulting providers response and recommendations. I have reviewed or attempted to review medical records based upon their availability    All of the patient's questions have been  addressed and answered. Patient's is agreeable to the above stated plan. I will continue to monitor closely and make adjustments to medical management as needed.  _____________________________________________________________________    Nutrition Status:    Radiology:  I have personally reviewed the following imaging and agree with the radiologist.     CT Pelvis With IV Contrast  Narrative: CLINICAL HISTORY:  Trauma.    TECHNIQUE:  Maxillofacial CT was performed without  contrast. There are sagittal and coronal reconstructed images available for review.    Automatic exposure control was utilized to reduce the patient's radiation dose.    DLP = 526    COMPARISON:  No prior imaging available for  comparison.    FINDINGS:  Inflammatory changes surround the rectum and anus with gas and minimal fluid noted within the right gluteal cleft measuring 3.6 cm.  This extends somewhat superiorly on the left (series 2, image 72).  Findings concerning for perirectal abscess or possible fistula.    Diverticulosis without evidence of diverticulitis.  Anti dependent air is noted within the bladder.  Correlate for recent catheterization.  No acute osseous.  Impression: Inflammatory changes surround the rectum and anus with gas and minimal fluid noted within the right gluteal cleft measuring 3.6 cm. This extends somewhat superiorly on the left (series 2, image 72).    Electronically signed by: Rory Zambrano  Date:    11/09/2023  Time:    13:38      Markus Thomas MD   11/14/2023

## 2023-11-14 NOTE — ANESTHESIA PREPROCEDURE EVALUATION
2023  Tonie Tejada is a 83 y.o., female who presents with Gangrenous Hemorrhoid.  Diagnosis: Gangrene of hemorrhoid [K64.8, I96]   Pre-op diagnosis: Gangrene of hemorrhoid [K64.8, I96]         She returns for The pt. comes to [facility] for the noted procedure under GA/LMA vs GA/TIVA.  Procedure: EXAM UNDER ANESTHESIA, DIGITAL, RECTUM   Anesthesia type: Gen Natural Airway, Gen Supraglottic Airway         PMHx:  Other Medical History   TIA (transient ischemic attack) DM (diabetes mellitus)   HTN (hypertension) HLD (hyperlipidemia)   Chronic hepatitis B OA (osteoarthritis)     Surgical History:  HYSTERECTOMY  SECTION   JOINT REPLACEMENT ESOPHAGOGASTRODUODENOSCOPY   COLONOSCOPY COLONOSCOPY, WITH POLYPECTOMY USING SNARE   EXAMINATION UNDER ANESTHESIA INCISION OF PERIRECTAL ABSCESS   EXCISIONAL HEMORRHOIDECTOMY          Vital signs:        Lab Data:      Echo:        EKG:      Pre-op Assessment    I have reviewed the Patient Summary Reports.     I have reviewed the Nursing Notes. I have reviewed the NPO Status.   I have reviewed the Medications.     Review of Systems  Anesthesia Hx:  No problems with previous Anesthesia                Social:  Non-Smoker       Hematology/Oncology:  Hematology Normal   Oncology Normal                                   EENT/Dental:  EENT/Dental Normal           Cardiovascular:  Exercise tolerance: good   Hypertension                Functional Capacity good / => 4 METS                         Pulmonary:      Shortness of breath                  Renal/:  Renal/ Normal                 Hepatic/GI:     GERD Liver Disease, Hepatitis           Musculoskeletal:  Arthritis               Neurological:  TIA,                                     Endocrine:  Diabetes           Dermatological:  Skin Normal    Psych:  Psychiatric Normal                    Physical Exam  General:  Alert, Oriented, Well nourished and Cooperative    Airway:  Mallampati: II   Mouth Opening: Normal  TM Distance: Normal  Tongue: Normal  Neck ROM: Normal ROM    Dental:  Intact    Chest/Lungs:  Clear to auscultation, Normal Respiratory Rate    Heart:  Rate: Normal  Rhythm: Regular Rhythm        Anesthesia Plan  Type of Anesthesia, risks & benefits discussed:    Anesthesia Type: Gen Supraglottic Airway, Gen Natural Airway  Intra-op Monitoring Plan: Standard ASA Monitors  Post Op Pain Control Plan: multimodal analgesia and IV/PO Opioids PRN  Induction:  IV  Informed Consent: Informed consent signed with the Patient and all parties understand the risks and agree with anesthesia plan.  All questions answered.   ASA Score: 3  Day of Surgery Review of History & Physical: H&P Update referred to the surgeon/provider.    Ready For Surgery From Anesthesia Perspective.     .

## 2023-11-14 NOTE — PT/OT/SLP PROGRESS
Physical Therapy Treatment    Patient Name:  Tonie Tejada   MRN:  [id number]    Recommendations:     Discharge therapy intensity: Moderate Intensity Therapy   Discharge Equipment Recommendations: walker, rolling  Barriers to discharge: Impaired mobility    Assessment:     Tonie Tejada is a 83 y.o. female admitted with a medical diagnosis of gangrene of hemorrhoid s/p I&D donald-rectal abscess and excision of 2 hemorrhoids.  She presents with the following impairments/functional limitations: weakness, impaired endurance, impaired functional mobility, gait instability, impaired balance, impaired self care skills, decreased upper extremity function, decreased lower extremity function, decreased safety awareness, pain     Pt with buckling of L knee during tx session. Pt and daughter unsure of when this started. Pt states she thinks this was occurring at home prior to admit.     Rehab Prognosis: Good;. illed PT services to address these deficits and reach maximum level of function.    Recent Surgery: Procedure(s) (LRB):  EXAM UNDER ANESTHESIA, DIGITAL, RECTUM (N/A) Day of Surgery    Plan:     During this hospitalization, patient to be seen 5 x/week to address the identified rehab impairments via gait training, therapeutic activities, therapeutic exercises and progress toward the following goals:    Plan of Care Expires:  12/11/23    Subjective     Chief Complaint:   Patient/Family Comments/goals:   Pain/Comfort:  Location 1: other (see comments) (rectum)  Pain Addressed 1: Reposition, Distraction      Objective:     Communicated with NSG prior to session.  Patient found HOB elevated with   upon PT entry to room.     General Precautions: Standard, fall  Orthopedic Precautions:    Braces:    Respiratory Status: Room air  Blood Pressure:   Skin Integrity: Visible skin intact      Functional Mobility:  Bed Mobility:     Scooting: contact guard assistance  Supine to Sit: minimum assistance  Sit to Supine: contact guard  assistance  Transfers:     Sit to Stand:  minimum assistance with rolling walker and from EOB and rollator   Gait: pt amb 10ft 2x with RW min-modA. Pt with buckling of LLE occasionally and required modA to prevent fall.   Dyn standing: pt stood at sink while brushing teeth. Pt required min-modA 2/2 L knee buckling.        Patient left HOB elevated with all lines intact and call button in reach..    GOALS:   Multidisciplinary Problems       Physical Therapy Goals          Problem: Physical Therapy    Goal Priority Disciplines Outcome Goal Variances Interventions   Physical Therapy Goal     PT, PT/OT Ongoing, Progressing     Description: Goals to be met by: 23     Patient will increase functional independence with mobility by performin. Supine to sit with F[place]ardo  2. Sit to stand transfer with Stand-by Assistance  3. Gait  x 150 feet with Stand-by Assistance using Rolling Walker.   4. Ascend/descend 3 stair with no Handrails with handheld assist.                         Time Tracking:     PT Received On: 23  PT Start Time: 0949     PT Stop Time: 1021  PT Total Time (min): 32 min     Billable Minutes: Gait Training 17 and Therapeutic Activity 15    Treatment Type: Treatment  PT/PTA: PTA     Number of PTA visits since last PT visit: 2023

## 2023-11-15 LAB
POCT GLUCOSE: 112 MG/DL (ref 70–110)
POCT GLUCOSE: 167 MG/DL (ref 70–110)

## 2023-11-15 PROCEDURE — A4216 STERILE WATER/SALINE, 10 ML: HCPCS | Performed by: INTERNAL MEDICINE

## 2023-11-15 PROCEDURE — 25000003 PHARM REV CODE 250: Performed by: COLON & RECTAL SURGERY

## 2023-11-15 PROCEDURE — 25000003 PHARM REV CODE 250: Performed by: INTERNAL MEDICINE

## 2023-11-15 PROCEDURE — 11000001 HC ACUTE MED/SURG PRIVATE ROOM

## 2023-11-15 PROCEDURE — 63600175 PHARM REV CODE 636 W HCPCS: Performed by: INTERNAL MEDICINE

## 2023-11-15 PROCEDURE — 97116 GAIT TRAINING THERAPY: CPT | Mod: CQ

## 2023-11-15 RX ORDER — TALC
6 POWDER (GRAM) TOPICAL NIGHTLY PRN
Status: DISCONTINUED | OUTPATIENT
Start: 2023-11-16 | End: 2023-11-17 | Stop reason: HOSPADM

## 2023-11-15 RX ADMIN — INSULIN ASPART 2 UNITS: 100 INJECTION, SOLUTION INTRAVENOUS; SUBCUTANEOUS at 10:11

## 2023-11-15 RX ADMIN — ATORVASTATIN CALCIUM 10 MG: 10 TABLET, FILM COATED ORAL at 08:11

## 2023-11-15 RX ADMIN — TENOFOVIR ALAFENAMIDE 25 MG: 25 TABLET ORAL at 09:11

## 2023-11-15 RX ADMIN — METRONIDAZOLE 500 MG: 500 TABLET ORAL at 06:11

## 2023-11-15 RX ADMIN — HYDROCODONE BITARTRATE AND ACETAMINOPHEN 1 TABLET: 7.5; 325 TABLET ORAL at 09:11

## 2023-11-15 RX ADMIN — METRONIDAZOLE 500 MG: 500 TABLET ORAL at 09:11

## 2023-11-15 RX ADMIN — SODIUM CHLORIDE, PRESERVATIVE FREE 10 ML: 5 INJECTION INTRAVENOUS at 05:11

## 2023-11-15 RX ADMIN — CIPROFLOXACIN HYDROCHLORIDE 500 MG: 500 TABLET, FILM COATED ORAL at 08:11

## 2023-11-15 RX ADMIN — SODIUM CHLORIDE: 9 INJECTION, SOLUTION INTRAVENOUS at 05:11

## 2023-11-15 RX ADMIN — TAMSULOSIN HYDROCHLORIDE 0.4 MG: 0.4 CAPSULE ORAL at 08:11

## 2023-11-15 RX ADMIN — METOPROLOL TARTRATE 25 MG: 25 TABLET, FILM COATED ORAL at 08:11

## 2023-11-15 RX ADMIN — METRONIDAZOLE 500 MG: 500 TABLET ORAL at 01:11

## 2023-11-15 RX ADMIN — INSULIN ASPART 1 UNITS: 100 INJECTION, SOLUTION INTRAVENOUS; SUBCUTANEOUS at 08:11

## 2023-11-15 RX ADMIN — AMLODIPINE BESYLATE 5 MG: 5 TABLET ORAL at 09:11

## 2023-11-15 RX ADMIN — POLYETHYLENE GLYCOL 3350 17 G: 17 POWDER, FOR SOLUTION ORAL at 08:11

## 2023-11-15 NOTE — PROGRESS NOTES
[facility] - 8th Floor Med Surg  Colon & Rectal Surgery  Progress Note    Subjective:     Interval History:  No acute events overnight.  Afebrile.  Hemodynamically stable.  Urine output adequate.  No evidence of ongoing infection during yesterday's exam under anesthesia.  White count 11.     Medications:  Continuous Infusions:   sodium chloride 0.9% 50 mL/hr at 11/15/23 0600     Scheduled Meds:   amLODIPine  5 mg Oral Daily    atorvastatin  10 mg Oral QHS    ciprofloxacin HCl  500 mg Oral Q12H    metoprolol tartrate  25 mg Oral BID    metroNIDAZOLE  500 mg Oral Q8H    polyethylene glycol  17 g Oral BID    sodium chloride 0.9%  10 mL Intravenous Q6H    tamsulosin  0.4 mg Oral Daily    tenofovir alafenamide  25 mg Oral Daily     PRN Meds:acetaminophen, acetaminophen, albuterol, dextrose 10%, dextrose 10%, glucagon (human recombinant), glucose, glucose, HYDROcodone-acetaminophen, insulin aspart U-100, LIDOcaine, morphine, naloxone, ondansetron, prochlorperazine, sodium chloride 0.9%, Flushing PICC/Midline Protocol **AND** sodium chloride 0.9% **AND** sodium chloride 0.9%     Objective:     Vital Signs (Most Recent):  Temp: 98.2 °F (36.8 °C) (11/15/23 0326)  Pulse: 70 (11/15/23 0326)  Resp: 16 (11/15/23 0326)  BP: 131/68 (11/15/23 0326)  SpO2: 99 % (11/15/23 0326) Vital Signs (24h Range):  Temp:  [97.5 °F (36.4 °C)-98.5 °F (36.9 °C)] 98.2 °F (36.8 °C)  Pulse:  [66-88] 70  Resp:  [16-20] 16  SpO2:  [95 %-99 %] 99 %  BP: (126-183)/() 131/68       Intake/Output Summary (Last 24 hours) at 11/15/2023 0718  Last data filed at 11/15/2023 0600  Gross per 24 hour   Intake 3595.19 ml   Output 1350 ml   Net 2245.19 ml       Gen: NAD  HEENT: PERRLA  Chest: Equal chest rise, bilateral breath sounds  Abdomen: soft, appropriately tender to palpation, non-distended.  Anus:  Serosanguineous fluid on dressing.  No purulence noted.  Extremities: Warm, well perfused.      Assessment/Plan:   83-year-old female with a  past medical history of chronic hepatitis-B, type 2 diabetes, hypertension, hyperlipidemia, osteoarthritis, history of stroke, CKD stage 3 who presented to the hospital with necrotic hemorrhoids as well as a perirectal abscess, she underwent an exam under anesthesia with excisional hemorrhoidectomy, 2 columns with drainage of perirectal abscess.  Of note at the operation she was found to have obliteration of half of her sphincter complex from the abscess.  Status post exam under anesthesia on November 14, 2023.  No active infection noted at that time.    No active infection noted on yesterday's exam under anesthesia.    Would continue p.o. antibiotics at discharge  Okay for discharge from a colorectal surgery standpoint.  She is at high-risk for fecal incontinence and we have discussed this with her in great detail.  We will give her time to see just how bad her incontinence is and how much it limits her daily life before considering colostomy creation.  Please call Colorectal surgery with questions or concerns    Nasim Munoz MD  General Surgery [facility]

## 2023-11-15 NOTE — ANESTHESIA POSTPROCEDURE EVALUATION
Anesthesia Post Evaluation    Patient: Tonie Tejada    Procedure(s) Performed: Procedure(s) (LRB):  EXAM UNDER ANESTHESIA, DIGITAL, RECTUM (N/A)    Final Anesthesia Type: general      Patient location during evaluation: PACU  Patient participation: Yes- Able to Participate  Level of consciousness: awake and alert and oriented  Post-procedure vital signs: reviewed and stable  Pain management: adequate  Airway patency: patent  LINDEN mitigation strategies: Verification of full reversal of neuromuscular block  PONV status at discharge: No PONV  Anesthetic complications: no      Cardiovascular status: blood pressure returned to baseline and stable  Respiratory status: spontaneous ventilation and unassisted  Hydration status: euvolemic  Follow-up not needed.  Comments: [facility]          Vitals Value Taken Time   /70 11/15/23 1536   Temp 36.5 °C (97.7 °F) 11/15/23 1536   Pulse 76 11/15/23 1536   Resp 18 11/15/23 1536   SpO2 98 % 11/15/23 1536         Event Time   Out of Recovery 11/14/2023 15:15:00         Pain/Haresh Score: Pain Rating Prior to Med Admin: 10 (11/15/2023  9:17 AM)  Pain Rating Post Med Admin: 2 (11/15/2023 10:17 AM)  Haresh Score: 10 (11/14/2023  3:15 PM)

## 2023-11-15 NOTE — PRE ADMISSION SCREENING
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  -polymicrobial infection  - pain control      Acute on chronic rectal pain- POA  -improving     Acute on chronic kidney disease- POA  -stable     Anemia- chronic disease- POA     DM type II with hyperglycemia- POA  - controlled     Weakness- POA    Facility Status: Pending Review    Referring Physician:  William    Admitting Physician:  Lidia Gray MD    Primary Care Physician:  Marcia Vaughan FNP    History         Patient Active Problem List    Diagnosis Date Noted    Encephalopathy 10/23/2023    Gastrointestinal hemorrhage 10/19/2023    Functional assessment declined 05/15/2023    SOB (shortness of breath) 09/07/2022    HTN (hypertension) 08/17/2022    HLD (hyperlipidemia) 08/17/2022    GERD (gastroesophageal reflux disease) 08/17/2022    Cataracts, bilateral 08/17/2022    Diabetes mellitus 08/17/2022    Expressive aphasia 07/19/2022         Previous Specialties/Consulted physicians:      Wound Care       Past and Current Medical History    Past Medical History:   Diagnosis Date    Chronic hepatitis B     DM (diabetes mellitus)     HLD (hyperlipidemia)     HTN (hypertension)     OA (osteoarthritis)     TIA (transient ischemic attack)            History of Present Illness     Assessment/Plan:   83-year-old female with a past medical history of chronic hepatitis-B, type 2 diabetes, hypertension, hyperlipidemia, osteoarthritis, history of stroke, CKD stage 3 who presented to the hospital with necrotic hemorrhoids as well as a perirectal abscess, she underwent  an exam under anesthesia with excisional hemorrhoidectomy, 2 columns with drainage of perirectal abscess.  Of note at the operation she was found to have obliteration of half of her sphincter complex from the abscess.  Status post exam under anesthesia on November 14, 2023.  No active infection noted at that time.     No active infection noted on yesterday's exam under anesthesia.    She is at high-risk for fecal incontinence and we have discussed this with her in great detail.  We will give her time to see just how bad her incontinence is and how much it limits her daily life before considering colostomy creation.  Please call Colorectal surgery with questions or concerns     Nasim Munoz MD  General Surgery HO4      Markus Thomas MD   Physician  [facility]     Progress Notes      Signed     Creation Time: 11/14/2023  4:35 PM       [facility] Hospital Medicine Progress Note          Chief Complaint: Inpatient Follow-up for Hemorrhoids (Pt reports pov  from Dr. Garcia's office with reports of necrotic hemorrhoids x2 weeks. Sent here by MD to be admitted through the ED. Pt reports taking OTC meds with no relief. Denies bleeding, but does report frequent bowel movements. )     HPI: 82 yo female with PMHx as mentioned below was sent to the ED after pt was seen by  Dr. Garcia in the office today for further evaluation of necrotic hemorrhoids. History is significant for recent admission in 10/2023 for hematochezia and pt underwent colonoscopy on 10/21/23 which revealed multiple diverticula, sigmoid colon polyp and hypertrophied anal papilla. Following colonoscopy pt developed a painful hemorrhoid and was seen by General surgery suggested conservative management. Pt reports in the last few days hemorrhoid became more painful and now it is hanging from rectum with active foul smell brownish discharge. Pt denies associated fever , chills, night sweats or other symptoms. On  arrival she was afebrile, mod hypertensive with normal HR and RR. O2 sat 99% on RA. Labs showed normal WBC, Hgb 9.2, Cr 1.3, LA 1.0. CT pelvis showed inflammatory changes surround the rectum and anus with gas and minimal fluid within the right gluteal cleft measuring 3.6 cm extending somewhat superiorly on the left.  On exam, swelling, erythema with active pus discharge noted from anus. Blood cultures were obtained and pt initiated on Zosyn. Dr. Garcia is planning for EUA in am      Interval Hx:      11-10-23 Dr. Thomas-patient is status post I and D debridement of perirectal abscess as well as excision of 2 external hemorrhoids by Dr. Garcia with Colorectal surgery        11/11/2023 Dr. Thomas-chart reviewed patient examined.  Patient voices no complaints.  She continues on Zosyn.  She is status post excision 2 necrotic external hemorrhoids as well as I&D of perirectal abscess.  Pain fairly controlled     11/12/23- delays asking for pain control/ bowel incontinent     11/13/2023 Dr. Thomas-chart reviewed patient examined.  Complaining of pains to rectum with BMs.  Surgical hospitalist added MiraLax scheduled.  Still patient looks good        11/14/2023 Dr. Thomas-patient is status post exam under anesthesia with no signs of infection/area irrigated patient returned to room.  Complaining of pain to rectum, give him medications.  Will increase her MiraLax to twice a day.  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 in the last 3 months? not applicable     Patient discharged from this LTAC to SNF within the last 45 days? no    Patient discharged from this LTAC to Rehab within the last 27 days? no    Prior residence: home    Prior Post-Acute Services: home health    Allergies: Review of patient's allergies indicates:  No Known Allergies    Has patient received the current influenza vaccine (Oct 1 - March 31)? Unknown     Has patient received PPD skin test prior to admit? N/A     Code Status: Full Code    Orientation: Time, Place, Person, and Events    Speech: normal     Vital Signs:   VITAL SIGNS: 24 HRS MIN & MAX LAST   Temp  Min: 97.5 °F (36.4 °C)  Max: 98.6 °F (37 °C) 98.3 °F (36.8 °C)   BP  Min: 126/104  Max: 183/86 (!) 173/82   Pulse  Min: 64  Max: 87  86   Resp  Min: 16  Max: 20 20   SpO2  Min: 95 %  Max: 99 % 98 %        Date     Blood Pressure     Pulse     Respiratory Rate     O2 Saturation     Temperature         Bowel/Bladder: continent of bladder and incontinent of bowel  Bowel/Bladder Appliance: N/A    Dialysis: N/A         Midline Catheter Insertion/Assessment  - Single Lumen 11/11/23 0008 Right basilic vein (medial side of arm) (Active)   Site Assessment Clean;Dry;Intact;No redness;No swelling 11/14/23 2035   IV Device Securement catheter securement device 11/14/23 2035   Line Status Infusing 11/14/23 2035   Dressing Type Central line dressing 11/14/23 2035   Dressing Status Clean;Dry;Intact 11/14/23 2035   Dressing Intervention Integrity maintained 11/14/23 2035   Dressing Change Due 11/18/23 11/14/23 2035   Number of days: 4       CBGs/Accuchecks: Yes     Precautions: Fall    Restraints: No     Isolation Precautions: N/A       Facility-Administered Medications as of 11/15/2023   Medication Dose Route Frequency Provider Last Rate Last Admin    0.9%  NaCl infusion   Intravenous Continuous Markus Thomas MD 50 mL/hr at 11/15/23 0600 Rate Verify at 11/15/23 0600    acetaminophen tablet 1,000 mg   1,000 mg Oral Q6H PRN Emy Walsh FNP   1,000 mg at 23 1742    acetaminophen tablet 650 mg  650 mg Oral Q4H PRN Emy Walsh FNP        albuterol inhaler 2 puff  2 puff Inhalation Q6H PRN Lidia Gray MD        amLODIPine tablet 5 mg  5 mg Oral Daily Lidia Gray MD   5 mg at 11/15/23 0900    atorvastatin tablet 10 mg  10 mg Oral QHS Lidia Gray MD   10 mg at 23    ciprofloxacin HCl tablet 500 mg  500 mg Oral Q12H Jamie Garcia MD   500 mg at 11/15/23 0849    dextrose 10% bolus 125 mL 125 mL  12.5 g Intravenous PRN Lidia Gray MD        dextrose 10% bolus 250 mL 250 mL  25 g Intravenous PRN Lidia Gray MD        [COMPLETED] diphenhydrAMINE capsule 25 mg  25 mg Oral Once Markus Thomas MD   25 mg at 23    glucagon (human recombinant) injection 1 mg  1 mg Intramuscular PRN Lidia Gray MD        glucose chewable tablet 16 g  16 g Oral PRN Lidia Gray MD        glucose chewable tablet 24 g  24 g Oral PRN Lidia Gray MD        HYDROcodone-acetaminophen 7.5-325 mg per tablet 1 tablet  1 tablet Oral Q4H PRN Jamie Garcia MD   1 tablet at 23    insulin aspart U-100 injection 0-10 Units  0-10 Units Subcutaneous QID (AC + HS) PRN Lidia Gray MD   2 Units at 11/10/23 2329    [COMPLETED] iopamidoL (ISOVUE-370) injection 100 mL  100 mL Intravenous ONCE PRN Beryl Barber MD   100 mL at 23 1333    [COMPLETED] lactated ringers bolus 1,000 mL  1,000 mL Intravenous ED 1 Time Beryl Barber  mL/hr at 23 1118 1,000 mL at 23 1118    [] lactated ringers infusion   Intravenous Continuous Lidia Gray MD 75 mL/hr at 23 1737 New Bag at 23 1737    LIDOcaine 4 % cream   Topical (Top) PRN Jamie Garcia MD   Given at 23 1412    metoprolol tartrate (LOPRESSOR) tablet 25 mg  25 mg Oral BID Lidia Gray MD   25 mg at 11/15/23 0848    metroNIDAZOLE tablet  500 mg  500 mg Oral Q8H Jamie Garcia MD   500 mg at 11/15/23 0607    morphine injection 2 mg  2 mg Intravenous Q4H PRN Jamie Garcia MD   2 mg at 11/14/23 2346    [COMPLETED] morphine injection 4 mg  4 mg Intravenous ED 1 Time Breyl Barber MD   4 mg at 11/09/23 1118    naloxone 0.4 mg/mL injection 0.02 mg  0.02 mg Intravenous PRN Emy Walsh FNP        [COMPLETED] ondansetron injection 4 mg  4 mg Intravenous ED 1 Time Beryl Barber MD   4 mg at 11/09/23 1118    ondansetron injection 4 mg  4 mg Intravenous Q4H PRN Emy Walsh FNP   4 mg at 11/10/23 1735    [COMPLETED] ondansetron injection 4 mg  4 mg Intravenous Once Jasper Lancaster MD   4 mg at 11/10/23 1126    [COMPLETED] piperacillin-tazobactam (ZOSYN) 4.5 g in dextrose 5 % in water (D5W) 100 mL IVPB (MB+)  4.5 g Intravenous ED 1 Time Lidia Gray  mL/hr at 11/09/23 1523 4.5 g at 11/09/23 1523    polyethylene glycol packet 17 g  17 g Oral BID Markus Thomas MD   17 g at 11/15/23 0859    prochlorperazine injection Soln 5 mg  5 mg Intravenous Q6H PRN Emy Walsh FNP        sodium chloride 0.9% flush 10 mL  10 mL Intravenous PRN Emy Walsh FNP        sodium chloride 0.9% flush 10 mL  10 mL Intravenous Q6H Lidia Gray MD   10 mL at 11/15/23 0501    And    sodium chloride 0.9% flush 10 mL  10 mL Intravenous PRN Lidia Gray MD        tamsulosin 24 hr capsule 0.4 mg  0.4 mg Oral Daily Lidia Gray MD   0.4 mg at 11/15/23 0851    tenofovir alafenamide tablet Tab 25 mg  25 mg Oral Daily Lidia Gray MD   25 mg at 11/13/23 0900     Outpatient Medications as of 11/15/2023   Medication Sig Dispense Refill    albuterol (PROVENTIL/VENTOLIN HFA) 90 mcg/actuation inhaler albuterol sulfate HFA 90 mcg/actuation aerosol inhaler   Inhale 1 puff as needed by inhalation route for 17 days.      amLODIPine (NORVASC) 5 MG tablet amlodipine 5 mg tablet   TAKE 1 TABLET BY MOUTH  "ONCE DAILY      aspirin (ECOTRIN) 81 MG EC tablet Adult Low Dose Aspirin 81 mg tablet,delayed release   Take 1 tablet every day by oral route.      diclofenac sodium (VOLTAREN) 1 % Gel Apply 2 g topically once daily.      metoprolol tartrate (LOPRESSOR) 25 MG tablet Take 25 mg by mouth 2 (two) times daily.      omeprazole (PRILOSEC) 40 MG capsule 1 capsule 30 minutes before morning meal      pioglitazone (ACTOS) 30 MG tablet Take 30 mg by mouth.      simvastatin (ZOCOR) 20 MG tablet simvastatin 20 mg tablet   TAKE 1 TABLET BY MOUTH ONCE DAILY AT BEDTIME      tamsulosin (FLOMAX) 0.4 mg Cap Take 1 capsule (0.4 mg total) by mouth once daily. 30 capsule 0    tenofovir alafenamide (VEMLIDY) 25 mg Tab Vemlidy 25 mg tablet  TAKE 1 TABLET BY MOUTH ONCE DAILY 90 tablet 1        Cardiovascular:    Cardiovascular Review: Within definable limits (WDL)    Telemetry: No    Rhythm: N/A    AICD: No      Respiratory:    Oxygen: N/A    CPT/Frequency: N/A    Incentive Spirometer/Frequency: N/A    CPAP/Settings: N/A    BiPAP/Settings: N/A    Oxygen Saturation:  upper 90's    Suction Frequency: N/A      Vent Settings:   Mode:   Rate:   TV:   FIO2:   PEEP:   PS:   iTime:    Trial/HS Settings:   Mode:   Rate:   TV:   FIO2:   PEEP:   PS:       Nutrition:      Ht Readings from Last 3 Encounters:   11/09/23 5' 2" (1.575 m)   10/19/23 5' (1.524 m)   09/06/23 5' (1.524 m)     Wt Readings from Last 1 Encounters:   11/09/23 1841 77.1 kg (170 lb)   11/09/23 1037 77.1 kg (170 lb)       Feeding Status:   Current Diet: Regular   Supplements:N/A   Tube Feeding: N/A   Flushes: N/A      Integumentary:    Integumentary: Within definable limits (WDL)              Incision/Site 11/10/23 1312 Anus (Active)   11/10/23 1312   Present Prior to Hospital Arrival?:    Side:    Location: Anus   Orientation:    Incision Type:    Closure Method:    Additional Comments:    Removal Indication and Assessment:    Wound Outcome:    Removal Indications:    Incision WDL " WDL 11/12/23 0737   Dressing Appearance Open to air 11/14/23 2035   Drainage Amount None 11/14/23 2035   Drainage Characteristics/Odor Serosanguineous 11/10/23 2139   Appearance Dressing in place, unable to visualize 11/14/23 0800   Periwound Area Intact 11/10/23 1347   Care Other (see comments) 11/11/23 0746   Dressing Gauze 11/14/23 0800   Number of days: 5         Musculoskeletal:    Transfer assist: Minimal Assistance    Weight Bearing Status: Full    Comments:        Physical Therapy Treatment     Patient Name:  Tonie Tejada   MRN:  [id number]     Recommendations:      Discharge therapy intensity: Moderate Intensity Therapy   Discharge Equipment Recommendations: walker, rolling  Barriers to discharge: Impaired mobility     Assessment:      Tonie Tejada is a 83 y.o. female admitted with a medical diagnosis of gangrene of hemorrhoid s/p I&D donald-rectal abscess and excision of 2 hemorrhoids.  She presents with the following impairments/functional limitations: weakness, impaired endurance, impaired functional mobility, gait instability, impaired balance, impaired self care skills, decreased upper extremity function, decreased lower extremity function, decreased safety awareness, pain      Pt with buckling of L knee during tx session. Pt and daughter unsure of when this started. Pt states she thinks this was occurring at home prior to admit.      Rehab Prognosis: Good;. illed PT services to address these deficits and reach maximum level of function.    Recent Surgery: Procedure(s) (LRB):  EXAM UNDER ANESTHESIA, DIGITAL, RECTUM (N/A) Day of Surgery     Plan:      During this hospitalization, patient to be seen 5 x/week to address the identified rehab impairments via gait training, therapeutic activities, therapeutic exercises and progress toward the following goals:     Plan of Care Expires:  12/11/23     Subjective      Chief Complaint:   Patient/Family Comments/goals:   Pain/Comfort:  Location 1: other (see  comments) (rectum)  Pain Addressed 1: Reposition, Distraction        Objective:      Communicated with NSG prior to session.  Patient found HOB elevated with   upon PT entry to room.      General Precautions: Standard, fall  Orthopedic Precautions:    Braces:    Respiratory Status: Room air  Blood Pressure:   Skin Integrity: Visible skin intact        Functional Mobility:  Bed Mobility:     Scooting: contact guard assistance  Supine to Sit: minimum assistance  Sit to Supine: contact guard assistance  Transfers:     Sit to Stand:  minimum assistance with rolling walker and from EOB and rollator   Gait: pt amb 10ft 2x with RW min-modA. Pt with buckling of LLE occasionally and required modA to prevent fall.   Dyn standing: pt stood at sink while brushing teeth. Pt required min-modA 2/2 L knee buckling.              ADL Assist: General Precautions: Standard, fall    Orthopedic Precautions:N/A  Braces: N/A  Respiratory Status: Room air     Occupational Performance:      Bed Mobility:    Patient completed Supine to Sit with minimum assistance  Patient completed Sit to Supine with contact guard assistance      Functional Mobility/Transfers:  Patient completed Sit <> Stand Transfer with minimum assistance  with  rolling walker      Activities of Daily Living:  Grooming: pt completed oral hygiene standing at sink with Min-Mod A for standing balance during task. LLE noted to buckle multiple times.     Special Equipment: walker    Radiology:    Radiology (Last 168 hours)               11/10 1310 Interventional Radiology       11/09 1332 CT Pelvis With IV Contrast                CT Pelvis With IV Contrast  Narrative: CLINICAL HISTORY:  Trauma.    TECHNIQUE:  Maxillofacial CT was performed without  contrast. There are sagittal and coronal reconstructed images available for review.    Automatic exposure control was utilized to reduce the patient's radiation dose.    DLP = 526    COMPARISON:  No prior imaging available for  comparison.    FINDINGS:  Inflammatory changes surround the rectum and anus with gas and minimal fluid noted within the right gluteal cleft measuring 3.6 cm.  This extends somewhat superiorly on the left (series 2, image 72).  Findings concerning for perirectal abscess or possible fistula.    Diverticulosis without evidence of diverticulitis.  Anti dependent air is noted within the bladder.  Correlate for recent catheterization.  No acute osseous.  Impression: Inflammatory changes surround the rectum and anus with gas and minimal fluid noted within the right gluteal cleft measuring 3.6 cm. This extends somewhat superiorly on the left (series 2, image 72).    Electronically signed by: Rory Zambrano  Date:    11/09/2023  Time:    13:38      Lab/Cultures:    Blood Urea Nitrogen   Date Value Ref Range Status   11/13/2023 14.9 9.8 - 20.1 mg/dL Final   11/12/2023 17.7 9.8 - 20.1 mg/dL Final     Creatinine   Date Value Ref Range Status   11/13/2023 1.40 (H) 0.55 - 1.02 mg/dL Final   11/12/2023 1.52 (H) 0.55 - 1.02 mg/dL Final     WBC   Date Value Ref Range Status   11/14/2023 11.95 (H) 4.50 - 11.50 x10(3)/mcL Final   11/12/2023 7.41 4.50 - 11.50 x10(3)/mcL Final   07/20/2022 4.6 x10(3)/mcL Final      CULTURE, BLOOD ([facility])   Date Value Ref Range Status   11/09/2023 No Growth at 5 days  Final   11/09/2023 No Growth at 5 days  Final     Urine Culture   Date Value Ref Range Status   10/22/2023   Final    Multiple organisms present indicate probable contamination. Recommend recollection.   10/22/2023 25,000-50,000 colonies/ml GAMMA STREPTOCOCCUS (A)  Final   10/22/2023 25,000-50,000 colonies/ml Gram-negative Rods (A)  Final   10/22/2023 (A)  Final    10,000 - 25,000 colonies/ml Presumptive proteus species     Wound Culture   Date Value Ref Range Status   11/09/2023 Many Morganella morganii (A)  Final   11/09/2023 Many Escherichia coli (A)  Final   11/09/2023 Many Enterococcus faecalis (A)  Final   11/09/2023 Moderate  "Pseudomonas aeruginosa (A)  Final     No results for input(s): "PH", "PCO2", "PO2", "HCO3", "POCSATURATED", "BE" in the last 72 hours.       "

## 2023-11-15 NOTE — PROGRESS NOTES
"Inpatient Nutrition Evaluation    Admit Date: 11/9/2023   Total duration of encounter: 6 days    Nutrition Recommendation/Prescription     -Continue current diet as tolerated.   -Monitor wt, labs, and intake.     Nutrition Assessment     Chart Review    Reason Seen: length of stay    Malnutrition Screening Tool Results   Have you recently lost weight without trying?: No  Have you been eating poorly because of a decreased appetite?: No   MST Score: 0     Diagnosis:  Thrombosed hemorrhoids/ perirectal abscess post I and D debridement of perirectal abscess as well as excision of 2 external hemorrhoids   - 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  -polymicrobial infection   Acute on chronic rectal pain  Acute on chronic kidney disease   Anemia- chronic disease  DM type II with hyperglycemia  Weakness    Relevant Medical History:   Chronic hepatitis B   DM (diabetes mellitus)   HLD (hyperlipidemia)   HTN (hypertension)   OA (osteoarthritis)   TIA       Nutrition-Related Medications: SSI, Miralax    Nutrition-Related Labs:  11/13/23: Na 135, Glu 121, GFR 37    Diet Order: Diet Adult Regular Diabetic  Oral Supplement Order: none  Appetite/Oral Intake: good/% of meals  Factors Affecting Nutritional Intake: none identified  Food/Orthodoxy/Cultural Preferences: none reported  Food Allergies: no known food allergies    Skin Integrity: incision  Wound(s):   surgical incision    Comments    11/15/23: Pt reports good appetite/intake; reports usual wt of ~170 lbs.     Anthropometrics    Height: 5' 2" (157.5 cm)    Last Weight: 77.1 kg (170 lb) (11/09/23 1841) Weight Method: Standard Scale  BMI (Calculated): 31.1  BMI Classification: obese grade I (BMI 30-34.9)     Ideal Body Weight (IBW), Female: 110 lb     % Ideal Body Weight, Female (lb): 154.55 %                             Usual Weight Provided By: patient and EMR weight history    Wt Readings from Last 5 Encounters:   11/09/23 77.1 kg (170 " lb)   10/19/23 77.6 kg (171 lb 1.2 oz)   09/06/23 78.2 kg (172 lb 6.4 oz)   05/15/23 74.3 kg (163 lb 12.8 oz)   01/31/23 (P) 73.8 kg (162 lb 11.2 oz)     Weight Change(s) Since Admission:  Admit Weight: 77.1 kg (170 lb) (11/09/23 1037)      Patient Education    Not applicable.    Monitoring & Evaluation     Dietitian will monitor energy intake and weight change.  Nutrition Risk/Follow-Up: low (follow-up in 5-7 days)  Patients assigned 'low nutrition risk' status do not qualify for a full nutritional assessment but will be monitored and re-evaluated in a 5-7 day time period. Please consult if re-evaluation needed sooner.

## 2023-11-15 NOTE — PT/OT/SLP PROGRESS
Physical Therapy Treatment    Patient Name:  Tonie Tejada   MRN:  [id number]    Recommendations:     Discharge therapy intensity: Moderate Intensity Therapy   Discharge Equipment Recommendations: to be determined by next level of care  Barriers to discharge: Impaired mobility    Assessment:     Tonie Tejada is a 83 y.o. female admitted with a medical diagnosis of gangrene of hemorrhoid s/p I&D donald-rectal abscess and excision of 2 hemorrhoids.  She presents with the following impairments/functional limitations: weakness, impaired endurance, impaired functional mobility, gait instability, impaired balance, impaired self care skills, decreased upper extremity function, decreased lower extremity function, decreased safety awareness, pain     Pt with buckling of L knee during tx session.     Rehab Prognosis: Good;. illed PT services to address these deficits and reach maximum level of function.    Recent Surgery: Procedure(s) (LRB):  EXAM UNDER ANESTHESIA, DIGITAL, RECTUM (N/A) 1 Day Post-Op    Plan:     During this hospitalization, patient to be seen 5 x/week to address the identified rehab impairments via gait training, therapeutic activities, therapeutic exercises and progress toward the following goals:    Plan of Care Expires:  12/11/23    Subjective     Chief Complaint:   Patient/Family Comments/goals:   Pain/Comfort:  Location 1: other (see comments) (rectum)  Pain Addressed 1: Reposition, Distraction      Objective:     Communicated with NSG prior to session.  Patient found HOB elevated with   upon PT entry to room.     General Precautions: Standard, fall  Orthopedic Precautions:    Braces:    Respiratory Status: Room air  Blood Pressure:   Skin Integrity: Visible skin intact      Functional Mobility:  Bed Mobility:     Scooting: contact guard assistance  Supine to Sit: moderate assistance  Transfers:     Sit to Stand:  minimum assistance with rolling walker and from EOB 1x and bedside chair 2x   Gait:  pt amb 32ft/26ft with RW min-modA. Pt with buckling of LLE 2x and required modA to prevent fall. Seated rest required between trials.        Patient left up in chair with all lines intact and call button in reach..    GOALS:   Multidisciplinary Problems       Physical Therapy Goals          Problem: Physical Therapy    Goal Priority Disciplines Outcome Goal Variances Interventions   Physical Therapy Goal     PT, PT/OT Ongoing, Progressing     Description: Goals to be met by: 23     Patient will increase functional independence with mobility by performin. Supine to sit with [place]  2. Sit to stand transfer with Stand-by Assistance  3. Gait  x 150 feet with Stand-by Assistance using Rolling Walker.   4. Ascend/descend 3 stair with no Handrails with handheld assist.                         Time Tracking:     PT Received On: 11/15/23  PT Start Time: 935     PT Stop Time: 1003  PT Total Time (min): 28 min     Billable Minutes: Gait Training 28    Treatment Type: Treatment  PT/PTA: PTA     Number of PTA visits since last PT visit: 2     11/15/2023

## 2023-11-15 NOTE — PROGRESS NOTES
[facility] Hospital Medicine Progress Note        Chief Complaint: Inpatient Follow-up for Hemorrhoids (Pt reports pov  from Dr. Garcia's office with reports of necrotic hemorrhoids x2 weeks. Sent here by MD to be admitted through the ED. Pt reports taking OTC meds with no relief. Denies bleeding, but does report frequent bowel movements. )    HPI: 82 yo female with PMHx as mentioned below was sent to the ED after pt was seen by  Dr. Garcia in the office today for further evaluation of necrotic hemorrhoids. History is significant for recent admission in 10/2023 for hematochezia and pt underwent colonoscopy on 10/21/23 which revealed multiple diverticula, sigmoid colon polyp and hypertrophied anal papilla. Following colonoscopy pt developed a painful hemorrhoid and was seen by General surgery suggested conservative management. Pt reports in the last few days hemorrhoid became more painful and now it is hanging from rectum with active foul smell brownish discharge. Pt denies associated fever , chills, night sweats or other symptoms. On arrival she was afebrile, mod hypertensive with normal HR and RR. O2 sat 99% on RA. Labs showed normal WBC, Hgb 9.2, Cr 1.3, LA 1.0. CT pelvis showed inflammatory changes surround the rectum and anus with gas and minimal fluid within the right gluteal cleft measuring 3.6 cm extending somewhat superiorly on the left.  On exam, swelling, erythema with active pus discharge noted from anus. Blood cultures were obtained and pt initiated on Zosyn. Dr. Garcia is planning for EUA in am     Interval Hx:     11-10-23 Dr. Thomas-patient is status post I and D debridement of perirectal abscess as well as excision of 2 external hemorrhoids by Dr. Garcia with Colorectal surgery      11/11/2023 Dr. Thomas-chart reviewed patient examined.  Patient voices no complaints.  She continues on Zosyn.  She is status post excision 2 necrotic external hemorrhoids as well as I&D  of perirectal abscess.  Pain fairly controlled    11/12/23- delays asking for pain control/ bowel incontinent    11/13/2023 Dr. Thomas-chart reviewed patient examined.  Complaining of pains to rectum with BMs.  Surgical hospitalist added MiraLax scheduled.  Still patient looks good      11/14/2023 Dr. Thomas-patient is status post exam under anesthesia with no signs of infection/area irrigated patient returned to room.  Complaining of pain to rectum, give him medications.  Will increase her MiraLax to twice a day.  Continue antibiotics      11/15/2023 Dr. Thomas-chart reviewed patient examined.  Refers feeling better today.  Good level of consciousness.  She had examination of area under anesthesia yesterday with no signs of infection.  Surgery has signed off case.  Recommendations to continue antibiotics   p.o. upon discharge(cipro/metro).   looking into TCU, probably Friday.        11/16/2023 Dr. Murguia reviewed patient examined.  Daughter concern because mom has episodes of confusion.  Patient is on [place] 5 q.4 hours p.r.n. pain.  She is alert and active to self/place/situation/year/no deficits.  Patient was accepted to TCU tomorrow a.m.        Case was discussed with patient's nurse and  on the floor.  Objective/physical exam:  General appearance:  Elderly female chronically ill-appearing; nontoxic reports of rectal pain     HENT: Atraumatic head. Moist mucous membranes of oral cavity.    Eyes: PERRL    Lungs: Clear to auscultation bilaterally. No wheezing present.     Heart: Regular rate and rhythm. S1 and S2 present cap refill brisk    Abdomen: Soft, non-distended, non-tender. No rebound tenderness/guarding.  Hypoactive bowel sounds     Extremities: No cyanosis, clubbing, atraumatic    Skin: warm and dry    Neuro:Oriented x 3, no focal deficits    Psych/mental status: Appropriate mood and affect., cooperative    VITAL SIGNS: 24 HRS MIN & MAX LAST   Temp  Min: 97.5 °F (36.4 °C)   Max: 98.5 °F (36.9 °C) 98.2 °F (36.8 °C)   BP  Min: 126/104  Max: 183/86 137/70   Pulse  Min: 64  Max: 88  64   Resp  Min: 16  Max: 20 18   SpO2  Min: 95 %  Max: 99 % 99 %     I have reviewed the following labs:  Recent Labs   Lab 11/11/23 0440 11/12/23 0453 11/14/23  1710   WBC 11.09 7.41 11.95*   RBC 3.01* 2.81* 3.48*   HGB 8.7* 8.1* 10.2*   HCT 27.0* 25.8* 31.5*   MCV 89.7 91.8 90.5   MCH 28.9 28.8 29.3   MCHC 32.2* 31.4* 32.4*   RDW 13.9 13.9 14.0    302 265   MPV 10.6* 10.6* 10.8*       Recent Labs   Lab 11/09/23  1138 11/10/23  0448 11/11/23  0440 11/12/23  0453 11/13/23  0639    138 134* 134* 135*   K 4.2 4.4 4.6 4.3 4.6   CO2 24 25 24 22* 22*   BUN 13.3 11.9 15.6 17.7 14.9   CREATININE 1.30* 1.45* 1.71* 1.52* 1.40*   CALCIUM 8.9 9.1 8.5 8.0* 8.4   MG  --   --  2.10 2.00  --    ALBUMIN 3.4 3.1* 2.9*  --   --    ALKPHOS 71 61 58  --   --    ALT 10 8 7  --   --    AST 14 14 11  --   --    BILITOT 0.7 0.9 0.7  --   --        Microbiology Results (last 7 days)       Procedure Component Value Units Date/Time    Blood culture #1 **CANNOT BE ORDERED STAT** [[id number]]  (Normal) Collected: 11/09/23 1138    Order Status: Completed Specimen: Blood Updated: 11/14/23 1302     CULTURE, BLOOD ([facility]) No Growth at 5 days    Blood culture #2 **CANNOT BE ORDERED STAT** [[id number]]  (Normal) Collected: 11/09/23 1138    Order Status: Completed Specimen: Blood Updated: 11/14/23 1302     CULTURE, BLOOD ([facility]) No Growth at 5 days    Wound Culture [[id number]]  (Abnormal)  (Susceptibility) Collected: 11/09/23 1129    Order Status: Completed Specimen: Drainage from Anus Updated: 11/13/23 1124     Wound Culture Many Morganella morganii      Many Escherichia coli      Many Enterococcus faecalis      Moderate Pseudomonas aeruginosa             See below for Radiology    Scheduled Med:   amLODIPine  5 mg Oral Daily    atorvastatin  10 mg Oral QHS    ciprofloxacin HCl  500 mg Oral Q12H    metoprolol tartrate  25 mg Oral BID     metroNIDAZOLE  500 mg Oral Q8H    polyethylene glycol  17 g Oral BID    sodium chloride 0.9%  10 mL Intravenous Q6H    tamsulosin  0.4 mg Oral Daily    tenofovir alafenamide  25 mg Oral Daily      Continuous Infusions:   sodium chloride 0.9% 50 mL/hr at 11/15/23 0600      PRN Meds:  acetaminophen, acetaminophen, albuterol, dextrose 10%, dextrose 10%, glucagon (human recombinant), glucose, glucose, HYDROcodone-acetaminophen, insulin aspart U-100, LIDOcaine, morphine, naloxone, ondansetron, prochlorperazine, sodium chloride 0.9%, Flushing PICC/Midline Protocol **AND** sodium chloride 0.9% **AND** sodium chloride 0.9%     Assessment/Plan:      Thrombosed hemorrhoids/ perirectal abscess post I and D debridement of perirectal abscess as well as excision of 2 external hemorrhoids   - 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  -polymicrobial infection  -continue cipro and metronidazole - leukocytosis resolved   -schedule MiraLax bid  - pain control       Acute on chronic rectal pain- POA  -improving    Acute on chronic kidney disease- POA  -stable    Anemia- chronic disease- POA    DM type II with hyperglycemia- POA  - controlled    Weakness- POA      Plan- continue cipro and metronidazole /bowel program/ tcu tomorrow/ decrease norco to q 6 hours/dc iv fluids     History of chronic hepatitis-B on Vemlidy, HLD, TIA on aspirin, osteoarthritis     VTE prophylaxis: scd    Patient condition:  Stable    Anticipated discharge and Disposition:  tcu 11-17-23      All diagnosis and differential diagnosis have been reviewed; assessment and plan has been documented; I have personally reviewed the labs and test results that are presently available; I have reviewed the patients medication list; I have reviewed the consulting providers response and recommendations. I have reviewed or attempted to review medical records based upon their availability    All of the patient's questions have been  addressed and  answered. Patient's is agreeable to the above stated plan. I will continue to monitor closely and make adjustments to medical management as needed.  _____________________________________________________________________    Nutrition Status:    Radiology:  I have personally reviewed the following imaging and agree with the radiologist.     CT Pelvis With IV Contrast  Narrative: CLINICAL HISTORY:  Trauma.    TECHNIQUE:  Maxillofacial CT was performed without  contrast. There are sagittal and coronal reconstructed images available for review.    Automatic exposure control was utilized to reduce the patient's radiation dose.    DLP = 526    COMPARISON:  No prior imaging available for comparison.    FINDINGS:  Inflammatory changes surround the rectum and anus with gas and minimal fluid noted within the right gluteal cleft measuring 3.6 cm.  This extends somewhat superiorly on the left (series 2, image 72).  Findings concerning for perirectal abscess or possible fistula.    Diverticulosis without evidence of diverticulitis.  Anti dependent air is noted within the bladder.  Correlate for recent catheterization.  No acute osseous.  Impression: Inflammatory changes surround the rectum and anus with gas and minimal fluid noted within the right gluteal cleft measuring 3.6 cm. This extends somewhat superiorly on the left (series 2, image 72).    Electronically signed by: Rory Zambrano  Date:    11/09/2023  Time:    13:38      Markus Thomas MD   11/15/2023

## 2023-11-16 LAB
ALBUMIN SERPL-MCNC: 2.8 G/DL (ref 3.4–4.8)
ALBUMIN/GLOB SERPL: 0.8 RATIO (ref 1.1–2)
ALP SERPL-CCNC: 61 UNIT/L (ref 40–150)
ALT SERPL-CCNC: 7 UNIT/L (ref 0–55)
AST SERPL-CCNC: 11 UNIT/L (ref 5–34)
BASOPHILS # BLD AUTO: 0.04 X10(3)/MCL
BASOPHILS NFR BLD AUTO: 0.5 %
BILIRUB SERPL-MCNC: 0.4 MG/DL
BUN SERPL-MCNC: 16.4 MG/DL (ref 9.8–20.1)
CALCIUM SERPL-MCNC: 8.4 MG/DL (ref 8.4–10.2)
CHLORIDE SERPL-SCNC: 106 MMOL/L (ref 98–107)
CO2 SERPL-SCNC: 22 MMOL/L (ref 23–31)
CREAT SERPL-MCNC: 1.14 MG/DL (ref 0.55–1.02)
EOSINOPHIL # BLD AUTO: 0.14 X10(3)/MCL (ref 0–0.9)
EOSINOPHIL NFR BLD AUTO: 1.9 %
ERYTHROCYTE [DISTWIDTH] IN BLOOD BY AUTOMATED COUNT: 14 % (ref 11.5–17)
GFR SERPLBLD CREATININE-BSD FMLA CKD-EPI: 48 MLS/MIN/1.73/M2
GLOBULIN SER-MCNC: 3.5 GM/DL (ref 2.4–3.5)
GLUCOSE SERPL-MCNC: 123 MG/DL (ref 82–115)
HCT VFR BLD AUTO: 26.5 % (ref 37–47)
HGB BLD-MCNC: 8.5 G/DL (ref 12–16)
IMM GRANULOCYTES # BLD AUTO: 0.06 X10(3)/MCL (ref 0–0.04)
IMM GRANULOCYTES NFR BLD AUTO: 0.8 %
LYMPHOCYTES # BLD AUTO: 1.23 X10(3)/MCL (ref 0.6–4.6)
LYMPHOCYTES NFR BLD AUTO: 16.6 %
MAGNESIUM SERPL-MCNC: 1.8 MG/DL (ref 1.6–2.6)
MCH RBC QN AUTO: 29 PG (ref 27–31)
MCHC RBC AUTO-ENTMCNC: 32.1 G/DL (ref 33–36)
MCV RBC AUTO: 90.4 FL (ref 80–94)
MONOCYTES # BLD AUTO: 0.88 X10(3)/MCL (ref 0.1–1.3)
MONOCYTES NFR BLD AUTO: 11.9 %
NEUTROPHILS # BLD AUTO: 5.04 X10(3)/MCL (ref 2.1–9.2)
NEUTROPHILS NFR BLD AUTO: 68.3 %
NRBC BLD AUTO-RTO: 0 %
PLATELET # BLD AUTO: 243 X10(3)/MCL (ref 130–400)
PMV BLD AUTO: 11.2 FL (ref 7.4–10.4)
POCT GLUCOSE: 130 MG/DL (ref 70–110)
POCT GLUCOSE: 152 MG/DL (ref 70–110)
POCT GLUCOSE: 174 MG/DL (ref 70–110)
POCT GLUCOSE: 183 MG/DL (ref 70–110)
POCT GLUCOSE: 99 MG/DL (ref 70–110)
POTASSIUM SERPL-SCNC: 3.8 MMOL/L (ref 3.5–5.1)
PROT SERPL-MCNC: 6.3 GM/DL (ref 5.8–7.6)
RBC # BLD AUTO: 2.93 X10(6)/MCL (ref 4.2–5.4)
SODIUM SERPL-SCNC: 134 MMOL/L (ref 136–145)
WBC # SPEC AUTO: 7.39 X10(3)/MCL (ref 4.5–11.5)

## 2023-11-16 PROCEDURE — 25000003 PHARM REV CODE 250: Performed by: INTERNAL MEDICINE

## 2023-11-16 PROCEDURE — 63600175 PHARM REV CODE 636 W HCPCS: Performed by: INTERNAL MEDICINE

## 2023-11-16 PROCEDURE — 80053 COMPREHEN METABOLIC PANEL: CPT | Performed by: INTERNAL MEDICINE

## 2023-11-16 PROCEDURE — 94761 N-INVAS EAR/PLS OXIMETRY MLT: CPT

## 2023-11-16 PROCEDURE — 83735 ASSAY OF MAGNESIUM: CPT | Performed by: INTERNAL MEDICINE

## 2023-11-16 PROCEDURE — 25000003 PHARM REV CODE 250: Performed by: COLON & RECTAL SURGERY

## 2023-11-16 PROCEDURE — 11000001 HC ACUTE MED/SURG PRIVATE ROOM

## 2023-11-16 PROCEDURE — 25000003 PHARM REV CODE 250: Performed by: NURSE PRACTITIONER

## 2023-11-16 PROCEDURE — A4216 STERILE WATER/SALINE, 10 ML: HCPCS | Performed by: INTERNAL MEDICINE

## 2023-11-16 PROCEDURE — 85025 COMPLETE CBC W/AUTO DIFF WBC: CPT | Performed by: INTERNAL MEDICINE

## 2023-11-16 RX ORDER — AMLODIPINE BESYLATE 5 MG/1
10 TABLET ORAL DAILY
Status: DISCONTINUED | OUTPATIENT
Start: 2023-11-17 | End: 2023-11-17 | Stop reason: HOSPADM

## 2023-11-16 RX ORDER — HYDRALAZINE HYDROCHLORIDE 20 MG/ML
10 INJECTION INTRAMUSCULAR; INTRAVENOUS EVERY 4 HOURS PRN
Status: DISCONTINUED | OUTPATIENT
Start: 2023-11-16 | End: 2023-11-17 | Stop reason: HOSPADM

## 2023-11-16 RX ORDER — HYDROCODONE BITARTRATE AND ACETAMINOPHEN 7.5; 325 MG/1; MG/1
1 TABLET ORAL EVERY 6 HOURS PRN
Status: DISCONTINUED | OUTPATIENT
Start: 2023-11-16 | End: 2023-11-17 | Stop reason: HOSPADM

## 2023-11-16 RX ORDER — ASPIRIN 81 MG/1
81 TABLET ORAL DAILY
Status: DISCONTINUED | OUTPATIENT
Start: 2023-11-17 | End: 2023-11-17 | Stop reason: HOSPADM

## 2023-11-16 RX ADMIN — SODIUM CHLORIDE, PRESERVATIVE FREE 10 ML: 5 INJECTION INTRAVENOUS at 12:11

## 2023-11-16 RX ADMIN — CIPROFLOXACIN HYDROCHLORIDE 500 MG: 500 TABLET, FILM COATED ORAL at 10:11

## 2023-11-16 RX ADMIN — SODIUM CHLORIDE: 9 INJECTION, SOLUTION INTRAVENOUS at 10:11

## 2023-11-16 RX ADMIN — METOPROLOL TARTRATE 25 MG: 25 TABLET, FILM COATED ORAL at 10:11

## 2023-11-16 RX ADMIN — AMLODIPINE BESYLATE 5 MG: 5 TABLET ORAL at 10:11

## 2023-11-16 RX ADMIN — SODIUM CHLORIDE, PRESERVATIVE FREE 10 ML: 5 INJECTION INTRAVENOUS at 10:11

## 2023-11-16 RX ADMIN — METRONIDAZOLE 500 MG: 500 TABLET ORAL at 09:11

## 2023-11-16 RX ADMIN — CIPROFLOXACIN HYDROCHLORIDE 500 MG: 500 TABLET, FILM COATED ORAL at 09:11

## 2023-11-16 RX ADMIN — HYDROCODONE BITARTRATE AND ACETAMINOPHEN 1 TABLET: 7.5; 325 TABLET ORAL at 10:11

## 2023-11-16 RX ADMIN — ATORVASTATIN CALCIUM 10 MG: 10 TABLET, FILM COATED ORAL at 09:11

## 2023-11-16 RX ADMIN — HYDROCODONE BITARTRATE AND ACETAMINOPHEN 1 TABLET: 7.5; 325 TABLET ORAL at 04:11

## 2023-11-16 RX ADMIN — METRONIDAZOLE 500 MG: 500 TABLET ORAL at 01:11

## 2023-11-16 RX ADMIN — Medication 6 MG: at 09:11

## 2023-11-16 RX ADMIN — METRONIDAZOLE 500 MG: 500 TABLET ORAL at 05:11

## 2023-11-16 RX ADMIN — INSULIN ASPART 2 UNITS: 100 INJECTION, SOLUTION INTRAVENOUS; SUBCUTANEOUS at 01:11

## 2023-11-16 RX ADMIN — SODIUM CHLORIDE, PRESERVATIVE FREE 10 ML: 5 INJECTION INTRAVENOUS at 11:11

## 2023-11-16 RX ADMIN — TAMSULOSIN HYDROCHLORIDE 0.4 MG: 0.4 CAPSULE ORAL at 10:11

## 2023-11-16 RX ADMIN — SODIUM CHLORIDE, PRESERVATIVE FREE 10 ML: 5 INJECTION INTRAVENOUS at 06:11

## 2023-11-16 RX ADMIN — METOPROLOL TARTRATE 25 MG: 25 TABLET, FILM COATED ORAL at 09:11

## 2023-11-16 NOTE — CARE UPDATE
[id number] Rec call from Kelly with [facility] who reported pt has been approved for TCU. Informed Elissa with TCU that pt has been approved. Pt will transport pt  tomorrow to TCU by Le. Informed pt's Silvia adames

## 2023-11-16 NOTE — PT/OT/SLP PROGRESS
Attempted to see pt for PT tx. Pt politely declined tx session 2/2 not having slept well last night and wanting to catch up. PT to f/u as schedule allows.

## 2023-11-17 VITALS
OXYGEN SATURATION: 98 % | DIASTOLIC BLOOD PRESSURE: 71 MMHG | HEIGHT: 62 IN | TEMPERATURE: 98 F | SYSTOLIC BLOOD PRESSURE: 137 MMHG | BODY MASS INDEX: 31.28 KG/M2 | RESPIRATION RATE: 18 BRPM | HEART RATE: 66 BPM | WEIGHT: 170 LBS

## 2023-11-17 PROBLEM — K61.1 PERIRECTAL ABSCESS: Status: ACTIVE | Noted: 2023-11-17

## 2023-11-17 LAB
POCT GLUCOSE: 134 MG/DL (ref 70–110)
POCT GLUCOSE: 154 MG/DL (ref 70–110)

## 2023-11-17 PROCEDURE — 25000003 PHARM REV CODE 250: Performed by: INTERNAL MEDICINE

## 2023-11-17 PROCEDURE — A4216 STERILE WATER/SALINE, 10 ML: HCPCS | Performed by: INTERNAL MEDICINE

## 2023-11-17 PROCEDURE — 63600175 PHARM REV CODE 636 W HCPCS: Performed by: INTERNAL MEDICINE

## 2023-11-17 PROCEDURE — 25000003 PHARM REV CODE 250: Performed by: COLON & RECTAL SURGERY

## 2023-11-17 RX ORDER — INSULIN ASPART 100 [IU]/ML
0-10 INJECTION, SOLUTION INTRAVENOUS; SUBCUTANEOUS
Status: CANCELLED | OUTPATIENT
Start: 2023-11-17

## 2023-11-17 RX ORDER — TALC
6 POWDER (GRAM) TOPICAL NIGHTLY PRN
Status: CANCELLED | OUTPATIENT
Start: 2023-11-17

## 2023-11-17 RX ORDER — SODIUM CHLORIDE 0.9 % (FLUSH) 0.9 %
10 SYRINGE (ML) INJECTION
Status: CANCELLED | OUTPATIENT
Start: 2023-11-17

## 2023-11-17 RX ORDER — IBUPROFEN 200 MG
24 TABLET ORAL
Status: CANCELLED | OUTPATIENT
Start: 2023-11-17

## 2023-11-17 RX ORDER — TAMSULOSIN HYDROCHLORIDE 0.4 MG/1
0.4 CAPSULE ORAL DAILY
Status: CANCELLED | OUTPATIENT
Start: 2023-11-17

## 2023-11-17 RX ORDER — ACETAMINOPHEN 500 MG
1000 TABLET ORAL EVERY 6 HOURS PRN
Status: CANCELLED | OUTPATIENT
Start: 2023-11-17

## 2023-11-17 RX ORDER — HYDRALAZINE HYDROCHLORIDE 20 MG/ML
10 INJECTION INTRAMUSCULAR; INTRAVENOUS EVERY 4 HOURS PRN
Status: CANCELLED | OUTPATIENT
Start: 2023-11-17

## 2023-11-17 RX ORDER — AMLODIPINE BESYLATE 5 MG/1
10 TABLET ORAL DAILY
Status: CANCELLED | OUTPATIENT
Start: 2023-11-17

## 2023-11-17 RX ORDER — CIPROFLOXACIN 500 MG/1
500 TABLET ORAL EVERY 12 HOURS
Status: CANCELLED | OUTPATIENT
Start: 2023-11-17

## 2023-11-17 RX ORDER — HYDROCODONE BITARTRATE AND ACETAMINOPHEN 7.5; 325 MG/1; MG/1
1 TABLET ORAL EVERY 6 HOURS PRN
Status: CANCELLED | OUTPATIENT
Start: 2023-11-17

## 2023-11-17 RX ORDER — GLUCAGON 1 MG
1 KIT INJECTION
Status: CANCELLED | OUTPATIENT
Start: 2023-11-17

## 2023-11-17 RX ORDER — METRONIDAZOLE 500 MG/1
500 TABLET ORAL EVERY 8 HOURS
Status: CANCELLED | OUTPATIENT
Start: 2023-11-17

## 2023-11-17 RX ORDER — IBUPROFEN 200 MG
16 TABLET ORAL
Status: CANCELLED | OUTPATIENT
Start: 2023-11-17

## 2023-11-17 RX ORDER — SODIUM CHLORIDE 0.9 % (FLUSH) 0.9 %
10 SYRINGE (ML) INJECTION EVERY 6 HOURS
Status: CANCELLED | OUTPATIENT
Start: 2023-11-17

## 2023-11-17 RX ORDER — METOPROLOL TARTRATE 25 MG/1
25 TABLET, FILM COATED ORAL 2 TIMES DAILY
Status: CANCELLED | OUTPATIENT
Start: 2023-11-17

## 2023-11-17 RX ORDER — ONDANSETRON 2 MG/ML
4 INJECTION INTRAMUSCULAR; INTRAVENOUS EVERY 4 HOURS PRN
Status: CANCELLED | OUTPATIENT
Start: 2023-11-17

## 2023-11-17 RX ORDER — ACETAMINOPHEN 325 MG/1
650 TABLET ORAL EVERY 4 HOURS PRN
Status: CANCELLED | OUTPATIENT
Start: 2023-11-17

## 2023-11-17 RX ORDER — LIDOCAINE 40 MG/G
CREAM TOPICAL
Status: CANCELLED | OUTPATIENT
Start: 2023-11-17

## 2023-11-17 RX ORDER — ASPIRIN 81 MG/1
81 TABLET ORAL DAILY
Status: CANCELLED | OUTPATIENT
Start: 2023-11-17

## 2023-11-17 RX ORDER — ATORVASTATIN CALCIUM 10 MG/1
10 TABLET, FILM COATED ORAL NIGHTLY
Status: CANCELLED | OUTPATIENT
Start: 2023-11-17

## 2023-11-17 RX ORDER — ALBUTEROL SULFATE 90 UG/1
2 AEROSOL, METERED RESPIRATORY (INHALATION) EVERY 6 HOURS PRN
Status: CANCELLED | OUTPATIENT
Start: 2023-11-17

## 2023-11-17 RX ORDER — PROCHLORPERAZINE EDISYLATE 5 MG/ML
5 INJECTION INTRAMUSCULAR; INTRAVENOUS EVERY 6 HOURS PRN
Status: CANCELLED | OUTPATIENT
Start: 2023-11-17

## 2023-11-17 RX ADMIN — SODIUM CHLORIDE, PRESERVATIVE FREE 10 ML: 5 INJECTION INTRAVENOUS at 11:11

## 2023-11-17 RX ADMIN — METOPROLOL TARTRATE 25 MG: 25 TABLET, FILM COATED ORAL at 09:11

## 2023-11-17 RX ADMIN — INSULIN ASPART 2 UNITS: 100 INJECTION, SOLUTION INTRAVENOUS; SUBCUTANEOUS at 11:11

## 2023-11-17 RX ADMIN — METRONIDAZOLE 500 MG: 500 TABLET ORAL at 05:11

## 2023-11-17 RX ADMIN — TAMSULOSIN HYDROCHLORIDE 0.4 MG: 0.4 CAPSULE ORAL at 09:11

## 2023-11-17 RX ADMIN — AMLODIPINE BESYLATE 10 MG: 5 TABLET ORAL at 09:11

## 2023-11-17 RX ADMIN — ASPIRIN 81 MG: 81 TABLET, COATED ORAL at 09:11

## 2023-11-17 RX ADMIN — SODIUM CHLORIDE, PRESERVATIVE FREE 10 ML: 5 INJECTION INTRAVENOUS at 05:11

## 2023-11-17 RX ADMIN — CIPROFLOXACIN HYDROCHLORIDE 500 MG: 500 TABLET, FILM COATED ORAL at 09:11

## 2023-11-17 NOTE — DISCHARGE SUMMARY
[facility] Hospital Medicine Discharge Summary    Admit Date: 10/19/2023  Discharge Date and Time: 11/17/20233:37 PM  Admitting Physician:  Team  Discharging Physician: Diana Warren MD.  Primary Care Physician: Marcia Vaughan FNP  Consults:  Surgery, Urology, Cardiology, Neurology    Discharge Diagnoses:  Acute GI bleed/melena  CKD 3b -at baseline  External hemorrhoids  Urinary retention     History of chronic hepatitis-B on Vemlidy, HTN, HLD, T2DM, TIA on aspirin, osteoarthrit    Hospital Course:   83-year-old female medical history of T2DM, HTN, HLD, CKD 3b, TIA on aspirin, osteoarthritis, chronic hepatitis-B on Vemlidy, hemorrhoids.     Present to the ED with complaint of melena/black stool.  Report she had an episode of black stool last week that resolved on its own.  Today had an episode where she felt dizzy/lightheaded with diffuse abdominal pain and large loose black bowel movements.  Subsequently she felt back to her baseline and abdominal pain subsided.  Report she has not been taking her aspirin for the past 3 weeks/ran out.  Denies oral NSAID use.  Denies prior EGD or colonoscopy.  Report prior history of intermittent hemorrhoidal bleed.     On arrival to ED she was afebrile and hemodynamically stable.  Labs notable for hemoglobin 12.3 > 11.6.  Rectal exam by ED physician show melanotic guaiac-positive stool.     She was given Protonix 80 mg IV, GI consulted and referred to hospital medicine service for further evaluation and management.  Patient received EGD and colonoscopy which was unrevealing.  Colonoscopy showed sigmoid colon which was removed.  Hypertrophied anal papules exam.  Examined the rectum and the patient has stage III external hemorrhoids.  General surgery consulted they recommended outpatient follow-up.  Had an episode of altered consciousness which was very brief did not have postictal confusion got EEG and MRI was negative for acute findings.  No  seizure like episodes afterwards    Continue to have urinary retention Ann was placed and Urology was called.    She was not able to get out of the bed at all, PT OT was consulted.    GI was consulted for external hemorrhoids they recommended outpatient follow-up.  Patient worked with PT and OT they recommended home health with PT so patient was discharged home in stable condition  Pt was seen and examined on the day of discharge  Vitals:  VITAL SIGNS: 24 HRS MIN & MAX LAST   No data recorded 99 °F (37.2 °C)   No data recorded 133/71   No data recorded  75   No data recorded 18   No data recorded 99 %       Physical Exam:  General awake alert oriented  Chest clear to auscultate   Abdomen soft nontender  Neuro alert awake    Procedures Performed: No admission procedures for hospital encounter.     Significant Diagnostic Studies: See Full reports for all details    Recent Labs   Lab 11/12/23  0453 11/14/23  1710 11/16/23  0502   WBC 7.41 11.95* 7.39   RBC 2.81* 3.48* 2.93*   HGB 8.1* 10.2* 8.5*   HCT 25.8* 31.5* 26.5*   MCV 91.8 90.5 90.4   MCH 28.8 29.3 29.0   MCHC 31.4* 32.4* 32.1*   RDW 13.9 14.0 14.0    265 243   MPV 10.6* 10.8* 11.2*       Recent Labs   Lab 11/11/23  0440 11/12/23  0453 11/13/23  0639 11/16/23  0502   * 134* 135* 134*   K 4.6 4.3 4.6 3.8   CO2 24 22* 22* 22*   BUN 15.6 17.7 14.9 16.4   CREATININE 1.71* 1.52* 1.40* 1.14*   CALCIUM 8.5 8.0* 8.4 8.4   MG 2.10 2.00  --  1.80   ALBUMIN 2.9*  --   --  2.8*   ALKPHOS 58  --   --  61   ALT 7  --   --  7   AST 11  --   --  11   BILITOT 0.7  --   --  0.4        Microbiology Results (last 7 days)       Procedure Component Value Units Date/Time    Urine culture [[id number]]  (Abnormal) Collected: 10/22/23 1620    Order Status: Completed Specimen: Urine Updated: 10/24/23 0928     Urine Culture Multiple organisms present indicate probable contamination. Recommend recollection.      25,000-50,000 colonies/ml GAMMA STREPTOCOCCUS      25,000-50,000  colonies/ml Gram-negative Rods      10,000 - 25,000 colonies/ml Presumptive proteus species             CT Pelvis With IV Contrast  Narrative: CLINICAL HISTORY:  Trauma.    TECHNIQUE:  Maxillofacial CT was performed without  contrast. There are sagittal and coronal reconstructed images available for review.    Automatic exposure control was utilized to reduce the patient's radiation dose.    DLP = 526    COMPARISON:  No prior imaging available for comparison.    FINDINGS:  Inflammatory changes surround the rectum and anus with gas and minimal fluid noted within the right gluteal cleft measuring 3.6 cm.  This extends somewhat superiorly on the left (series 2, image 72).  Findings concerning for perirectal abscess or possible fistula.    Diverticulosis without evidence of diverticulitis.  Anti dependent air is noted within the bladder.  Correlate for recent catheterization.  No acute osseous.  Impression: Inflammatory changes surround the rectum and anus with gas and minimal fluid noted within the right gluteal cleft measuring 3.6 cm. This extends somewhat superiorly on the left (series 2, image 72).    Electronically signed by: Rory Zambrano  Date:    11/09/2023  Time:    13:38         Medication List        START taking these medications      hydrocortisone 1 % cream  Apply topically 2 (two) times daily. for 5 days     hyoscyamine 0.125 mg Subl  Place 1 tablet (0.125 mg total) under the tongue every 4 (four) hours as needed (BLADDER SPASM).     tamsulosin 0.4 mg Cap  Commonly known as: FLOMAX  Take 1 capsule (0.4 mg total) by mouth once daily.            CONTINUE taking these medications      albuterol 90 mcg/actuation inhaler  Commonly known as: PROVENTIL/VENTOLIN HFA     amLODIPine 5 MG tablet  Commonly known as: NORVASC     aspirin 81 MG EC tablet  Commonly known as: ECOTRIN     diclofenac sodium 1 % Gel  Commonly known as: VOLTAREN     metoprolol tartrate 25 MG tablet  Commonly known as: LOPRESSOR      omeprazole 40 MG capsule  Commonly known as: PRILOSEC     pioglitazone 30 MG tablet  Commonly known as: ACTOS     simvastatin 20 MG tablet  Commonly known as: ZOCOR     VEMLIDY 25 mg Tab  Generic drug: tenofovir alafenamide  Vemlidy 25 mg tablet  TAKE 1 TABLET BY MOUTH ONCE DAILY            ASK your doctor about these medications      cefdinir 300 MG capsule  Commonly known as: OMNICEF  Take 1 capsule (300 mg total) by mouth 2 (two) times daily. for 2 days  Ask about: Should I take this medication?               Where to Get Your Medications        These medications were sent to [id number] - [place] - [address] [address] at [address] [facility]  [address] [place] [address]      Phone: [phone]   cefdinir 300 MG capsule  hydrocortisone 1 % cream  hyoscyamine 0.125 mg Subl  tamsulosin 0.4 mg Cap          Explained in detail to the patient about the discharge plan, medications, and follow-up visits. Pt understands and agrees with the treatment plan  Discharge Disposition: Skilled Nursing Facility   Discharged Condition: stable  Diet-    Medications Per PR med rec  Activities as tolerated   Follow-up Information       Marcia Vaughan FNP. Go on 11/2/2023.    Specialty: Family Medicine  Why: Follow up appointment on 11-2 at 10:30 @ [facility] [address] [place]   Phone # [phone]  Contact information:  [address] [place] [address] [phone]               Naveed Garrett MD. Go on 11/7/2023.    Specialty: Urology  Why: if patient d/c home with yen, f/u in 1 week for voiding trial -  follow up on 11-7 at 8:00 am  Contact information:  [address] [facility] [address]  [facility] [address] [phone]               Reinaldo Mejia MD. Go in 1 month(s).    Specialty: Gastroenterology  Why: 's office will call you with follow up appoinmtent  Contact information:  [address]  [facility] [id number] [phone]               [facility] Follow up.     Specialty: Home Health Services  Why: This is your home health provider. You may contact the office for any questions or concerns regarding your home health services.  Contact information:  [address]  [place] [address]  [phone]               [facility] .    Specialty: Home Health Services  Contact information:  [address]    [place] [address]  [phone]                           For further questions contact hospitalist office    Discharge time 33 minutes    For worsening symptoms, chest pain, shortness of breath, increased abdominal pain, high grade fever, stroke or stroke like symptoms, immediately go to the nearest Emergency Room or call 911 as soon as possible.      Diana Bailey M.D, on 11/17/2023. at 3:37 PM.

## 2023-11-17 NOTE — NURSING
Patient discharged to TCU. Report given to Mayte @ 1230. Patient transported to TCU by Le @ 1300. Patient in stable condition. Midline in place for usage at TCU.

## 2023-11-17 NOTE — PLAN OF CARE
Problem: Adult Inpatient Plan of Care  Goal: Plan of Care Review  11/17/2023 1033 by James Davis LPN  Outcome: Ongoing, Progressing  11/17/2023 1033 by James Davis LPN  Outcome: Ongoing, Progressing  Goal: Patient-Specific Goal (Individualized)  11/17/2023 1033 by James Davis LPN  Outcome: Ongoing, Progressing  11/17/2023 1033 by James Davis LPN  Outcome: Ongoing, Progressing  Goal: Absence of Hospital-Acquired Illness or Injury  11/17/2023 1033 by James Davis LPN  Outcome: Ongoing, Progressing  11/17/2023 1033 by James Davis LPN  Outcome: Ongoing, Progressing  Goal: Optimal Comfort and Wellbeing  11/17/2023 1033 by James Davis LPN  Outcome: Ongoing, Progressing  11/17/2023 1033 by James Davis LPN  Outcome: Ongoing, Progressing  Goal: Readiness for Transition of Care  11/17/2023 1033 by James Davis LPN  Outcome: Ongoing, Progressing  11/17/2023 1033 by James Davis LPN  Outcome: Ongoing, Progressing     Problem: Diabetes Comorbidity  Goal: Blood Glucose Level Within Targeted Range  11/17/2023 1033 by James Davis LPN  Outcome: Ongoing, Progressing  11/17/2023 1033 by James Davis LPN  Outcome: Ongoing, Progressing     Problem: Fall Injury Risk  Goal: Absence of Fall and Fall-Related Injury  11/17/2023 1033 by James Davis LPN  Outcome: Ongoing, Progressing  11/17/2023 1033 by James Davis LPN  Outcome: Ongoing, Progressing     Problem: Skin Injury Risk Increased  Goal: Skin Health and Integrity  11/17/2023 1033 by James Davis LPN  Outcome: Ongoing, Progressing  11/17/2023 1033 by James Davis LPN  Outcome: Ongoing, Progressing     Problem: Infection  Goal: Absence of Infection Signs and Symptoms  11/17/2023 1033 by James Davis LPN  Outcome: Ongoing, Progressing  11/17/2023 1033 by James Davis LPN  Outcome: Ongoing, Progressing

## 2023-11-17 NOTE — DISCHARGE SUMMARY
[facility]  Hospital Medicine Discharge Summary    Admit Date: 11/9/2023  Discharge Date and Time: 11/17/2023 11:08 AM  Admitting Physician:  Team  Discharging Physician: Gelacio Garrett MD.  Primary Care Physician: Marcia Vaughan FNP  Consults: Colorectal Surgery, General Surgery    Discharge Diagnoses:  Perirectal abscess status post incision and drainage and excision of 2 external hemorrhoids  Bowel incontinence due to obliterated sphincter  Stage IIIA chronic kidney disease  Anemia of chronic disease   Non-insulin-dependent type 2 diabetes mellitus     Hospital Course:   Patient is an 83-year-old  female who was sent to the ED after pt was seen by  Dr. Garcia in the office today for further evaluation of necrotic hemorrhoids. History is significant for recent admission in 10/2023 for hematochezia and pt underwent colonoscopy on 10/21/23 which revealed multiple diverticula, sigmoid colon polyp and hypertrophied anal papilla. Following colonoscopy pt developed a painful hemorrhoid and was seen by General Surgery suggested conservative management. Pt reports in the last few days hemorrhoid became more painful and now it is hanging from rectum with active foul smell brownish discharge. Pt denies associated fever , chills, night sweats or other symptoms. On arrival she was afebrile, mod hypertensive with normal HR and RR. O2 sat 99% on RA. Labs showed normal WBC, Hgb 9.2, Cr 1.3, LA 1.0. CT pelvis showed inflammatory changes surround the rectum and anus with gas and minimal fluid within the right gluteal cleft measuring 3.6 cm extending somewhat superiorly on the left.  On exam, swelling, erythema with active pus discharge noted from anus. Blood cultures were obtained and patient was initiated on Zosyn.  Patient underwent incision and drainage of the perirectal abscess and excision of 2 external hemorrhoids on 11/10/2023.    Patient underwent exam under  anesthesia on 11/14/2023 and there was no evidence of ongoing infection and no need for debridement.    Patient will be discharged to the transitional care unit on oral antibiotics for continued wound care and other supportive care.    Patient was seen and examined on the day of discharge.      Vitals:  VITAL SIGNS: 24 HRS MIN & MAX LAST   Temp  Min: 97.8 °F (36.6 °C)  Max: 98.4 °F (36.9 °C) 97.8 °F (36.6 °C)   BP  Min: 138/62  Max: 166/84 138/62   Pulse  Min: 62  Max: 75  62   Resp  Min: 16  Max: 18 18   SpO2  Min: 97 %  Max: 98 % 97 %       Physical Exam:  General: in no acute distress  HENT: normocephalic, atraumatic  Eye: PERRL, EOMI, clear conjunctiva  Neck: full ROM, no thyromegaly, no JVD  Respiratory: clear to auscultation bilaterally  Cardiovascular: regular rate and rhythm  Gastrointestinal: non-distended, positive bowel sounds, non-tender  Musculoskeletal: no gross deformity  Integumentary: warm, dry, intact, no rashes  Neurological: cranial nerves grossly intact, no focal neurological deficit  Psychiatric: cooperative      Procedures Performed: No admission procedures for hospital encounter.     Significant Diagnostic Studies: See Full reports for all details    Recent Labs   Lab 11/12/23  0453 11/14/23  1710 11/16/23  0502   WBC 7.41 11.95* 7.39   RBC 2.81* 3.48* 2.93*   HGB 8.1* 10.2* 8.5*   HCT 25.8* 31.5* 26.5*   MCV 91.8 90.5 90.4   MCH 28.8 29.3 29.0   MCHC 31.4* 32.4* 32.1*   RDW 13.9 14.0 14.0    265 243   MPV 10.6* 10.8* 11.2*       Recent Labs   Lab 11/11/23  0440 11/12/23  0453 11/13/23  0639 11/16/23  0502   * 134* 135* 134*   K 4.6 4.3 4.6 3.8   CO2 24 22* 22* 22*   BUN 15.6 17.7 14.9 16.4   CREATININE 1.71* 1.52* 1.40* 1.14*   CALCIUM 8.5 8.0* 8.4 8.4   MG 2.10 2.00  --  1.80   ALBUMIN 2.9*  --   --  2.8*   ALKPHOS 58  --   --  61   ALT 7  --   --  7   AST 11  --   --  11   BILITOT 0.7  --   --  0.4        Microbiology Results (last 7 days)       Procedure Component Value Units  Date/Time    Blood culture #1 **CANNOT BE ORDERED STAT** [[id number]]  (Normal) Collected: 11/09/23 1138    Order Status: Completed Specimen: Blood Updated: 11/14/23 1302     CULTURE, BLOOD ([facility]) No Growth at 5 days    Blood culture #2 **CANNOT BE ORDERED STAT** [[id number]]  (Normal) Collected: 11/09/23 1138    Order Status: Completed Specimen: Blood Updated: 11/14/23 1302     CULTURE, BLOOD ([facility]) No Growth at 5 days    Wound Culture [[id number]]  (Abnormal)  (Susceptibility) Collected: 11/09/23 1129    Order Status: Completed Specimen: Drainage from Anus Updated: 11/13/23 1124     Wound Culture Many Morganella morganii      Many Escherichia coli      Many Enterococcus faecalis      Moderate Pseudomonas aeruginosa             CT Pelvis With IV Contrast  Narrative: CLINICAL HISTORY:  Trauma.    TECHNIQUE:  Maxillofacial CT was performed without  contrast. There are sagittal and coronal reconstructed images available for review.    Automatic exposure control was utilized to reduce the patient's radiation dose.    DLP = 526    COMPARISON:  No prior imaging available for comparison.    FINDINGS:  Inflammatory changes surround the rectum and anus with gas and minimal fluid noted within the right gluteal cleft measuring 3.6 cm.  This extends somewhat superiorly on the left (series 2, image 72).  Findings concerning for perirectal abscess or possible fistula.    Diverticulosis without evidence of diverticulitis.  Anti dependent air is noted within the bladder.  Correlate for recent catheterization.  No acute osseous.  Impression: Inflammatory changes surround the rectum and anus with gas and minimal fluid noted within the right gluteal cleft measuring 3.6 cm. This extends somewhat superiorly on the left (series 2, image 72).    Electronically signed by: Rory Zambrano  Date:    11/09/2023  Time:    13:38         Medication List        ASK your doctor about these medications      albuterol 90 mcg/actuation  inhaler  Commonly known as: PROVENTIL/VENTOLIN HFA     amLODIPine 5 MG tablet  Commonly known as: NORVASC     aspirin 81 MG EC tablet  Commonly known as: ECOTRIN     diclofenac sodium 1 % Gel  Commonly known as: VOLTAREN     hydrocortisone 1 % cream  Apply topically 2 (two) times daily. for 5 days     hyoscyamine 0.125 mg Subl  Place 1 tablet (0.125 mg total) under the tongue every 4 (four) hours as needed (BLADDER SPASM).     metoprolol tartrate 25 MG tablet  Commonly known as: LOPRESSOR     omeprazole 40 MG capsule  Commonly known as: PRILOSEC     pioglitazone 30 MG tablet  Commonly known as: ACTOS     simvastatin 20 MG tablet  Commonly known as: ZOCOR     tamsulosin 0.4 mg Cap  Commonly known as: FLOMAX  Take 1 capsule (0.4 mg total) by mouth once daily.     VEMLIDY 25 mg Tab  Generic drug: tenofovir alafenamide  Vemlidy 25 mg tablet  TAKE 1 TABLET BY MOUTH ONCE DAILY               Explained in detail to the patient about the discharge plan, medications, and follow-up visits. Pt understands and agrees with the treatment plan  Discharge Disposition:  Transitional care unit (SNF)  Discharged Condition: stable  Diet-   Dietary Orders (From admission, onward)       Start     Ordered    11/15/23 1100  Diet Adult Regular Diabetic  Diet effective now        Question:  Diet Modifier:  Answer:  Diabetic    11/15/23 1100                      Follow-up Information       Jamie Garcia MD Follow up in 3 week(s).    Specialty: Colon and Rectal Surgery  Why: appointment 12-1 @9:45am  Contact information:  [facility] [address] [address] [phone]                           For further questions contact your colorectal surgeon    Discharge time 35 minutes    For worsening symptoms, chest pain, shortness of breath, increased abdominal pain, high grade fever, stroke or stroke like symptoms, immediately go to the nearest Emergency Room or call 911 as soon as possible.      Gelacio Rebolledo M.D, on  11/17/2023. at 11:08 AM.

## 2023-12-01 NOTE — ADDENDUM NOTE
Addendum  created 11/30/23 1904 by Jasper Lancaster MD    Attestation recorded in Intraprocedure, Intraprocedure Attestations filed, Intraprocedure Staff edited

## 2024-01-22 PROBLEM — K92.2 GASTROINTESTINAL HEMORRHAGE: Status: RESOLVED | Noted: 2023-10-19 | Resolved: 2024-01-22

## 2024-04-12 ENCOUNTER — OFFICE VISIT (OUTPATIENT)
Dept: INFECTIOUS DISEASES | Facility: CLINIC | Age: 84
End: 2024-04-12
Payer: MEDICARE

## 2024-04-12 ENCOUNTER — LAB VISIT (OUTPATIENT)
Dept: LAB | Facility: HOSPITAL | Age: 84
End: 2024-04-12
Attending: NURSE PRACTITIONER
Payer: MEDICARE

## 2024-04-12 VITALS
BODY MASS INDEX: 32.62 KG/M2 | DIASTOLIC BLOOD PRESSURE: 66 MMHG | TEMPERATURE: 98 F | HEART RATE: 58 BPM | WEIGHT: 177.25 LBS | SYSTOLIC BLOOD PRESSURE: 160 MMHG | RESPIRATION RATE: 16 BRPM | HEIGHT: 62 IN

## 2024-04-12 DIAGNOSIS — B18.1 CHRONIC HEPATITIS B: Primary | ICD-10-CM

## 2024-04-12 DIAGNOSIS — B18.1 CHRONIC HEPATITIS B: ICD-10-CM

## 2024-04-12 LAB
AFP-TM SERPL-MCNC: <2 NG/ML
ALBUMIN SERPL-MCNC: 3.8 G/DL (ref 3.4–4.8)
ALBUMIN/GLOB SERPL: 1 RATIO (ref 1.1–2)
ALP SERPL-CCNC: 72 UNIT/L (ref 40–150)
ALT SERPL-CCNC: 7 UNIT/L (ref 0–55)
AST SERPL-CCNC: 14 UNIT/L (ref 5–34)
BASOPHILS # BLD AUTO: 0.03 X10(3)/MCL
BASOPHILS NFR BLD AUTO: 0.5 %
BILIRUB SERPL-MCNC: 0.6 MG/DL
BUN SERPL-MCNC: 28.8 MG/DL (ref 9.8–20.1)
CALCIUM SERPL-MCNC: 9.5 MG/DL (ref 8.4–10.2)
CHLORIDE SERPL-SCNC: 109 MMOL/L (ref 98–107)
CO2 SERPL-SCNC: 25 MMOL/L (ref 23–31)
CREAT SERPL-MCNC: 1.62 MG/DL (ref 0.55–1.02)
EOSINOPHIL # BLD AUTO: 0.15 X10(3)/MCL (ref 0–0.9)
EOSINOPHIL NFR BLD AUTO: 2.4 %
ERYTHROCYTE [DISTWIDTH] IN BLOOD BY AUTOMATED COUNT: 11.8 % (ref 11.5–17)
GFR SERPLBLD CREATININE-BSD FMLA CKD-EPI: 31 MLS/MIN/1.73/M2
GLOBULIN SER-MCNC: 3.9 GM/DL (ref 2.4–3.5)
GLUCOSE SERPL-MCNC: 95 MG/DL (ref 82–115)
HCT VFR BLD AUTO: 34.9 % (ref 37–47)
HGB BLD-MCNC: 11.7 G/DL (ref 12–16)
IMM GRANULOCYTES # BLD AUTO: 0.03 X10(3)/MCL (ref 0–0.04)
IMM GRANULOCYTES NFR BLD AUTO: 0.5 %
INR PPP: 1.1
LYMPHOCYTES # BLD AUTO: 1.94 X10(3)/MCL (ref 0.6–4.6)
LYMPHOCYTES NFR BLD AUTO: 30.6 %
MCH RBC QN AUTO: 31.7 PG (ref 27–31)
MCHC RBC AUTO-ENTMCNC: 33.5 G/DL (ref 33–36)
MCV RBC AUTO: 94.6 FL (ref 80–94)
MONOCYTES # BLD AUTO: 0.54 X10(3)/MCL (ref 0.1–1.3)
MONOCYTES NFR BLD AUTO: 8.5 %
NEUTROPHILS # BLD AUTO: 3.65 X10(3)/MCL (ref 2.1–9.2)
NEUTROPHILS NFR BLD AUTO: 57.5 %
NRBC BLD AUTO-RTO: 0 %
PLATELET # BLD AUTO: 164 X10(3)/MCL (ref 130–400)
PMV BLD AUTO: 12.9 FL (ref 7.4–10.4)
POTASSIUM SERPL-SCNC: 4.5 MMOL/L (ref 3.5–5.1)
PROT SERPL-MCNC: 7.7 GM/DL (ref 5.8–7.6)
PROTHROMBIN TIME: 14.3 SECONDS (ref 11.4–14)
RBC # BLD AUTO: 3.69 X10(6)/MCL (ref 4.2–5.4)
SODIUM SERPL-SCNC: 141 MMOL/L (ref 136–145)
WBC # SPEC AUTO: 6.34 X10(3)/MCL (ref 4.5–11.5)

## 2024-04-12 PROCEDURE — 87517 HEPATITIS B DNA QUANT: CPT

## 2024-04-12 PROCEDURE — 82105 ALPHA-FETOPROTEIN SERUM: CPT

## 2024-04-12 PROCEDURE — 3288F FALL RISK ASSESSMENT DOCD: CPT | Mod: CPTII,,, | Performed by: NURSE PRACTITIONER

## 2024-04-12 PROCEDURE — 85610 PROTHROMBIN TIME: CPT

## 2024-04-12 PROCEDURE — 85025 COMPLETE CBC W/AUTO DIFF WBC: CPT

## 2024-04-12 PROCEDURE — 1101F PT FALLS ASSESS-DOCD LE1/YR: CPT | Mod: CPTII,,, | Performed by: NURSE PRACTITIONER

## 2024-04-12 PROCEDURE — 80053 COMPREHEN METABOLIC PANEL: CPT

## 2024-04-12 PROCEDURE — 99214 OFFICE O/P EST MOD 30 MIN: CPT | Mod: S$PBB,,, | Performed by: NURSE PRACTITIONER

## 2024-04-12 PROCEDURE — 36415 COLL VENOUS BLD VENIPUNCTURE: CPT

## 2024-04-12 PROCEDURE — 3078F DIAST BP <80 MM HG: CPT | Mod: CPTII,,, | Performed by: NURSE PRACTITIONER

## 2024-04-12 PROCEDURE — 1160F RVW MEDS BY RX/DR IN RCRD: CPT | Mod: CPTII,,, | Performed by: NURSE PRACTITIONER

## 2024-04-12 PROCEDURE — 99214 OFFICE O/P EST MOD 30 MIN: CPT | Mod: PBBFAC | Performed by: NURSE PRACTITIONER

## 2024-04-12 PROCEDURE — 3077F SYST BP >= 140 MM HG: CPT | Mod: CPTII,,, | Performed by: NURSE PRACTITIONER

## 2024-04-12 PROCEDURE — 1159F MED LIST DOCD IN RCRD: CPT | Mod: CPTII,,, | Performed by: NURSE PRACTITIONER

## 2024-04-12 PROCEDURE — 1125F AMNT PAIN NOTED PAIN PRSNT: CPT | Mod: CPTII,,, | Performed by: NURSE PRACTITIONER

## 2024-04-12 RX ORDER — OMEPRAZOLE 40 MG/1
40 CAPSULE, DELAYED RELEASE ORAL
COMMUNITY
Start: 2024-01-05

## 2024-04-12 RX ORDER — ALBUTEROL SULFATE 90 UG/1
1 AEROSOL, METERED RESPIRATORY (INHALATION) EVERY 4 HOURS PRN
COMMUNITY
Start: 2024-01-01

## 2024-04-12 RX ORDER — TENOFOVIR ALAFENAMIDE 25 MG/1
TABLET ORAL
Qty: 90 TABLET | Refills: 1 | Status: SHIPPED | OUTPATIENT
Start: 2024-04-12

## 2024-04-12 NOTE — PROGRESS NOTES
Patient ID: Tonie Tejada 83 y.o.     Chief Complaint:   Chief Complaint   Patient presents with    Followup Hep B     Denies problems        HPI:    4/12/24  Ms. Schilling is an 82 yo AAF here today for HBV f/u visit, accompanied by her daughter Silvia. She tells me that she takes Vemlidy daily, but Silvia states that she has been missing some doses of all of her meds due to sleeping too much. She stayed with Silvia for about a month or so after hospital discharge, has been home with spouse and son since 12/23. Labs 9/23 HBV ND, AFP <2.0, 11/23 Creatinine 1.66, GR 44, AST 14, ALT 8, INR 1.2.  Will update labs today and closely monitor renal function for any need to revise treatment plan. Last U/S 2/23, repeat ordered. Denies jaundice, icterus, nausea, vomiting, dark urine, or jazz colored stools.  All questions answered & concerns addressed.    9/6/23  Ms. Schilling is a 82 yo AAF presenting today for HBV f/u visit, accompanied by Silvia.  She takes Vemlidy daily and is well controlled.  Labs 1/31/23 HBV UD.  RUQ abd u/s 2/23 WNL. FibroScan repeated 1/23 S1, F0-1.  She denies jaundice, icterus, nausea, vomiting, dark urine, or jazz colored stools. She tolerated 1st dose of Shingrix well, amenable to 2nd dose today as recommended. Today, she tells me that she has noted a rash to her left groin and under her abdomen for the past couple of months.  It does itch, but not always. Worse when active and sweating.  Has tried OTC cortisone cream, not effective.      1/31/23  Ms. Schilling is a 83 yo AAF here today for HBV f/u visit, accompanied by daughter Silvia. She is taking Vemlidy daily and tolerates it well.  Last labs 3/22 HBV UD, liver enzynmes normal.  She has been working on diet and is feeling somewhat better.  However, she tells me that she did fall 3 times in the past few months and is having some memory loss.  She sleeps most of the day and was recently hospitalized with TIA.  Appreciates referral to  [facility] for assumption of PCP care.  Last FibroScan 2021 S 2/3, F0/1. Will repeat today.  Due for routine RUQ abd u/s for HCC screening as well, orders placed.  Appreciates flu vaccine today.  All questions answered & concerns addressed.           Past Medical History:   Diagnosis Date    Chronic hepatitis B     DM (diabetes mellitus)     HLD (hyperlipidemia)     HTN (hypertension)     OA (osteoarthritis)     TIA (transient ischemic attack)         Past Surgical History:   Procedure Laterality Date     SECTION      COLONOSCOPY N/A 10/21/2023    Procedure: COLONOSCOPY;  Surgeon: Jasper Davis MD;  Location: [facility];  Service: Gastroenterology;  Laterality: N/A;    COLONOSCOPY, WITH POLYPECTOMY USING SNARE  10/21/2023    Procedure: COLONOSCOPY, WITH POLYPECTOMY USING SNARE;  Surgeon: Jasper Davis MD;  Location: [facility] OR;  Service: Gastroenterology;;    DIGITAL RECTAL EXAMINATION UNDER ANESTHESIA N/A 2023    Procedure: EXAM UNDER ANESTHESIA, DIGITAL, RECTUM;  Surgeon: Jamie Garcia MD;  Location: [facility] OR;  Service: General;  Laterality: N/A;    ESOPHAGOGASTRODUODENOSCOPY N/A 10/20/2023    Procedure: EGD;  Surgeon: Cody Urbina MD;  Location: [facility] ENDOSCOPY;  Service: Gastroenterology;  Laterality: N/A;    EXAMINATION UNDER ANESTHESIA N/A 11/10/2023    Procedure: Exam under anesthesia;  Surgeon: Jamie Garcia MD;  Location: [facility] OR;  Service: Colon and Rectal;  Laterality: N/A;  Prone jackknife    EXCISIONAL HEMORRHOIDECTOMY  11/10/2023    Procedure: HEMORRHOIDECTOMY;  Surgeon: Jamie Garcia MD;  Location: [facility] OR;  Service: Colon and Rectal;;    HYSTERECTOMY      INCISION OF PERIRECTAL ABSCESS  11/10/2023    Procedure: INCISION, ABSCESS, PERIRECTAL;  Surgeon: Jamie Garcia MD;  Location: [facility] OR;  Service: Colon and Rectal;;    JOINT REPLACEMENT          Social History     Socioeconomic History    Marital status:     Number of  children: 5   Tobacco Use    Smoking status: Former    Smokeless tobacco: Never    Tobacco comments:     states a pack would last a week, quit 50 yrs ago   Substance and Sexual Activity    Alcohol use: Not Currently    Drug use: Never    Sexual activity: Not Currently     Partners: Male     Social Determinants of Health     Financial Resource Strain: Low Risk  (11/17/2023)    Overall Financial Resource Strain (CARDIA)     Difficulty of Paying Living Expenses: Not hard at all   Food Insecurity: No Food Insecurity (11/17/2023)    Hunger Vital Sign     Worried About Running Out of Food in the Last Year: Never true     Ran Out of Food in the Last Year: Never true   Transportation Needs: No Transportation Needs (11/17/2023)    PRAPARE - Transportation     Lack of Transportation (Medical): No     Lack of Transportation (Non-Medical): No   Physical Activity: Inactive (11/17/2023)    Exercise Vital Sign     Days of Exercise per Week: 0 days     Minutes of Exercise per Session: 0 min   Stress: No Stress Concern Present (11/17/2023)    [employer] - Occupational Stress Questionnaire     Feeling of Stress : Only a little   Social Connections: Moderately Isolated (11/17/2023)    Social Connection and Isolation Panel [NHANES]     Frequency of Communication with Friends and Family: More than three times a week     Frequency of Social Gatherings with Friends and Family: Three times a week     Attends [employer] Services: Never     Active Member of Clubs or Organizations: No     Attends Club or Organization Meetings: Never     Marital Status:    Housing Stability: Low Risk  (11/17/2023)    Housing Stability Vital Sign     Unable to Pay for Housing in the Last Year: No     Number of Places Lived in the Last Year: 1     Unstable Housing in the Last Year: No        History reviewed. No pertinent family history.     Review of patient's allergies indicates:  No Known Allergies     Immunization History  "  Administered Date(s) Administered    COVID-19, MRNA, LN-S, PF (MODERNA FULL 0.5 ML DOSE) 07/23/2021, 12/14/2021, 12/14/2021    Hepatitis A, Adult 02/06/2017, 08/07/2017    Hepatitis B, Adult 10/16/2017    Influenza 11/10/2014    Influenza (FLUAD) - Quadrivalent - Adjuvanted - PF *Preferred* (65+) 01/31/2023    Influenza - High Dose - PF (65 years and older) 10/10/2016, 10/16/2017, 10/15/2018, 10/24/2019    Influenza - Quadrivalent 03/19/2021    Influenza - Quadrivalent - High Dose - PF (65 years and older) 11/04/2021    Influenza - Quadrivalent - PF *Preferred* (6 months and older) 11/19/2004, 02/24/2010    Influenza - Trivalent - PF (ADULT) 11/19/2004, 02/24/2010    Pneumococcal Conjugate - 13 Valent 02/12/2018    Pneumococcal Polysaccharide - 23 Valent 02/13/2019    Tdap 10/16/2017    Zoster Recombinant 01/31/2023, 09/06/2023        Review of Systems   Constitutional: Negative.    HENT: Negative.     Eyes: Negative.    Respiratory: Negative.     Cardiovascular: Negative.    Gastrointestinal: Negative.    Genitourinary: Negative.    Musculoskeletal: Negative.    Skin: Negative.    Neurological: Negative.    Endo/Heme/Allergies: Negative.    Psychiatric/Behavioral: Negative.     All other systems reviewed and are negative.         Objective:      BP (!) 160/66   Pulse (!) 58   Temp 97.7 °F (36.5 °C) (Oral)   Resp 16   Ht 5' 2" (1.575 m)   Wt 80.4 kg (177 lb 4 oz)   BMI 32.42 kg/m²      Physical Exam  Vitals reviewed.   Constitutional:       General: She is not in acute distress.     Appearance: Normal appearance. She is not toxic-appearing.   Eyes:      General: No scleral icterus.  Cardiovascular:      Rate and Rhythm: Normal rate and regular rhythm.      Heart sounds: Normal heart sounds.   Pulmonary:      Effort: Pulmonary effort is normal. No respiratory distress.      Breath sounds: Normal breath sounds.   Abdominal:      General: Bowel sounds are normal. There is no distension.      Palpations: Abdomen " is soft. There is no mass.      Tenderness: There is no abdominal tenderness.   Skin:     General: Skin is warm and dry.   Neurological:      Mental Status: She is alert and oriented to person, place, and time.          Labs:   Lab Results   Component Value Date    WBC 6.34 04/12/2024    HGB 11.7 (L) 04/12/2024    HCT 34.9 (L) 04/12/2024    MCV 94.6 (H) 04/12/2024     04/12/2024       CMP  Sodium Level   Date Value Ref Range Status   04/12/2024 141 136 - 145 mmol/L Final     Potassium Level   Date Value Ref Range Status   04/12/2024 4.5 3.5 - 5.1 mmol/L Final     Carbon Dioxide   Date Value Ref Range Status   04/12/2024 25 23 - 31 mmol/L Final     Blood Urea Nitrogen   Date Value Ref Range Status   04/12/2024 28.8 (H) 9.8 - 20.1 mg/dL Final     Creatinine   Date Value Ref Range Status   04/12/2024 1.62 (H) 0.55 - 1.02 mg/dL Final     Calcium Level Total   Date Value Ref Range Status   04/12/2024 9.5 8.4 - 10.2 mg/dL Final     Albumin Level   Date Value Ref Range Status   04/12/2024 3.8 3.4 - 4.8 g/dL Final     Bilirubin Total   Date Value Ref Range Status   04/12/2024 0.6 <=1.5 mg/dL Final     Alkaline Phosphatase   Date Value Ref Range Status   04/12/2024 72 40 - 150 unit/L Final     Aspartate Aminotransferase   Date Value Ref Range Status   04/12/2024 14 5 - 34 unit/L Final     Alanine Aminotransferase   Date Value Ref Range Status   04/12/2024 7 0 - 55 unit/L Final     eGFR   Date Value Ref Range Status   04/12/2024 31 mls/min/1.73/m2 Final     Lab Results   Component Value Date    TSH 1.581 01/31/2023     HIV   Date Value Ref Range Status   01/31/2023 Nonreactive Nonreactive Final     PT   Date Value Ref Range Status   04/12/2024 14.3 (H) 11.4 - 14.0 seconds Final     INR   Date Value Ref Range Status   04/12/2024 1.1 <=1.3 Final     Syphilis Antibody   Date Value Ref Range Status   01/31/2023 Reactive (A) Nonreactive, Equivocal Final     Hepatitis B Surface Antigen   Date Value Ref Range Status    01/31/2023 Reactive (A) Nonreactive Final     Hep C Ab Interp   Date Value Ref Range Status   01/31/2023 Nonreactive Nonreactive Final     Hepatitis B Surface Antigen Confirmation   Date Value Ref Range Status   01/31/2023 Confirmed Positive  Final     Results for orders placed or performed in visit on 01/31/23   Hepatitis B e Antigen   Result Value Ref Range    Hepatitis Be Ag, S Negative Negative     Results for orders placed or performed in visit on 01/31/23   Hepatitis B e antibody   Result Value Ref Range    HBe Antibody Positive (A) Negative     Results for orders placed or performed in visit on 09/06/23   Hepatitis B DNA, Quant   Result Value Ref Range    HBV DNA Detect/Quant Undetected Undetected IU/mL       Imaging: Reviewed most recent relevant imaging studies available, notable results highlighted in this note      Medications:     Current Outpatient Medications   Medication Instructions    albuterol (PROVENTIL/VENTOLIN HFA) 90 mcg/actuation inhaler 1 puff, Every 4 hours PRN    amLODIPine (NORVASC) 5 mg, Oral, Daily    hydrocortisone (ANUSOL-HC) 2.5 % rectal cream Rectal, 2 times daily    metoprolol tartrate (LOPRESSOR) 25 mg, Oral, 2 times daily    omeprazole (PRILOSEC) 40 mg, Oral    pioglitazone (ACTOS) 30 mg, Oral, Daily    simvastatin (ZOCOR) 20 mg, Oral, Nightly    tenofovir alafenamide (VEMLIDY) 25 mg Tab Vemlidy 25 mg tablet  TAKE 1 TABLET BY MOUTH ONCE DAILY       Assessment:       Problem List Items Addressed This Visit    None  Visit Diagnoses       Chronic hepatitis B    -  Primary    Relevant Medications    tenofovir alafenamide (VEMLIDY) 25 mg Tab    Other Relevant Orders    AFP Tumor Marker    Comprehensive Metabolic Panel (Completed)    CBC Auto Differential (Completed)    Hepatitis B DNA, Quant    Protime-INR (Completed)    US Abdomen Limited               Plan:      Chronic hepatitis B  -     tenofovir alafenamide (VEMLIDY) 25 mg Tab; Vemlidy 25 mg tablet  TAKE 1 TABLET BY MOUTH ONCE  DAILY  Dispense: 90 tablet; Refill: 1  -     AFP Tumor Marker; Future; Expected date: 04/12/2024  -     Comprehensive Metabolic Panel; Future; Expected date: 04/12/2024  -     CBC Auto Differential; Future; Expected date: 04/12/2024  -     Hepatitis B DNA, Quant; Future; Expected date: 04/12/2024  -     Protime-INR; Future; Expected date: 04/12/2024  -     US Abdomen Limited; Future; Expected date: 04/26/2024  Blood precautions, do not share a razor, needle, toothbrush, or clippers with anyone.    Use condoms for sexual encounters.   Vaccination of all household members and close contacts strongly encouraged.  Avoid or minimize all alcohol intake.   Limit Tylenol use to 2 grams per day.   RUQ abdominal ultrasound every 6-12 months for liver cancer screening.   Lower fat diet, higher fiber diet.   Continue Vemlidy 25 mg daily as prescribed, avoid treatment interruptions which can lead to viral rebound and acute hepatitis infection.  Labs today as ordered.  Will call Silvia with results as requested.   FibroScan 11/21 S 2-3, F 0-1; 1/23 S1, F0-1.  RUQ abd u/s 2/23 NL. Repeat u/s next available.   RTC 6 months with Rhianna.

## 2024-04-13 LAB — HBV DNA SERPL NAA+PROBE-ACNC: NORMAL IU/ML

## 2024-04-16 ENCOUNTER — TELEPHONE (OUTPATIENT)
Dept: INFECTIOUS DISEASES | Facility: CLINIC | Age: 84
End: 2024-04-16
Payer: MEDICARE

## 2024-04-16 DIAGNOSIS — N18.32 STAGE 3B CHRONIC KIDNEY DISEASE: Primary | ICD-10-CM

## 2024-04-16 NOTE — TELEPHONE ENCOUNTER
Lab results reviewed. HBV not detected, well controlled. However, creatinine is elevated at 1.62 with eGFR at 31.  Vemlidy is only safe to take with eGFR > 30.  We need to monitor this closely. She is scheduled for u/s on 4/26/24. Will order repeat BMP for same day. Please call Silvia with results & plan at [phone]. Also encourage the following recommendations for renal health.     Low sodium (<2 grams per day), moderate protein intake.  Glucose and BP control optimization.  Avoid protein supplements and NSAIDS (Advil, ibuprofen, Aleve, Mobic, etc.)  Keep hydrated, drink plenty of water.  Increase exercise to 30 minutes 3-5 times per week.  Maintain a healthy weight.   Smoking cessation encouraged.   Minimize all alcohol consumption.

## 2024-04-16 NOTE — LETTER
April 19, 2024    Tonie Tejada  [address] [facility] [address]             [facility]  [address] [facility] [address]  Phone: [phone] Dear Mrs. Tonie Tejada:        Good Afternoon, this is Mounika at [facility] with Mrs. Rhianna Pierce, NP's clinic. I am contacting you because I have been unable to reach you regarding your lab results. Please contact the clinic at your earliest convenience at [phone]. Thank you.       Sincerely,        Rhianna Pierce, APRN

## 2024-04-19 NOTE — TELEPHONE ENCOUNTER
Phoned patient. No answer. Message left on voice mail to contact clinic. Letter mailed to home address to contact clinic for lab results.

## 2024-05-16 ENCOUNTER — HOSPITAL ENCOUNTER (OUTPATIENT)
Dept: RADIOLOGY | Facility: HOSPITAL | Age: 84
Discharge: HOME | End: 2024-05-16
Attending: NURSE PRACTITIONER
Payer: MEDICARE

## 2024-05-16 DIAGNOSIS — B18.1 CHRONIC HEPATITIS B: ICD-10-CM

## 2024-05-16 PROCEDURE — 76705 ECHO EXAM OF ABDOMEN: CPT | Mod: TC

## 2024-07-16 ENCOUNTER — OFFICE VISIT (OUTPATIENT)
Dept: INFECTIOUS DISEASES | Facility: CLINIC | Age: 84
End: 2024-07-16
Payer: MEDICARE

## 2024-07-16 VITALS
HEART RATE: 61 BPM | BODY MASS INDEX: 32.22 KG/M2 | SYSTOLIC BLOOD PRESSURE: 160 MMHG | WEIGHT: 175.06 LBS | HEIGHT: 62 IN | TEMPERATURE: 98 F | DIASTOLIC BLOOD PRESSURE: 68 MMHG | RESPIRATION RATE: 12 BRPM

## 2024-07-16 DIAGNOSIS — E78.5 HYPERLIPIDEMIA, UNSPECIFIED HYPERLIPIDEMIA TYPE: ICD-10-CM

## 2024-07-16 DIAGNOSIS — N18.32 STAGE 3B CHRONIC KIDNEY DISEASE: ICD-10-CM

## 2024-07-16 DIAGNOSIS — E11.9 TYPE 2 DIABETES MELLITUS WITHOUT COMPLICATION, UNSPECIFIED WHETHER LONG TERM INSULIN USE: ICD-10-CM

## 2024-07-16 DIAGNOSIS — I10 PRIMARY HYPERTENSION: ICD-10-CM

## 2024-07-16 DIAGNOSIS — B18.1 CHRONIC HEPATITIS B: Primary | ICD-10-CM

## 2024-07-16 DIAGNOSIS — K21.9 GASTROESOPHAGEAL REFLUX DISEASE, UNSPECIFIED WHETHER ESOPHAGITIS PRESENT: ICD-10-CM

## 2024-07-16 LAB
ALBUMIN SERPL-MCNC: 4.1 G/DL (ref 3.4–4.8)
ALBUMIN/GLOB SERPL: 1 RATIO (ref 1.1–2)
ALP SERPL-CCNC: 83 UNIT/L (ref 40–150)
ALT SERPL-CCNC: 11 UNIT/L (ref 0–55)
ANION GAP SERPL CALC-SCNC: 10 MEQ/L
AST SERPL-CCNC: 15 UNIT/L (ref 5–34)
BILIRUB SERPL-MCNC: 0.7 MG/DL
BUN SERPL-MCNC: 29.4 MG/DL (ref 9.8–20.1)
CALCIUM SERPL-MCNC: 9.9 MG/DL (ref 8.4–10.2)
CHLORIDE SERPL-SCNC: 108 MMOL/L (ref 98–107)
CO2 SERPL-SCNC: 24 MMOL/L (ref 23–31)
CREAT SERPL-MCNC: 1.75 MG/DL (ref 0.55–1.02)
CREAT/UREA NIT SERPL: 17
GFR SERPLBLD CREATININE-BSD FMLA CKD-EPI: 28 ML/MIN/1.73/M2
GLOBULIN SER-MCNC: 4 GM/DL (ref 2.4–3.5)
GLUCOSE SERPL-MCNC: 107 MG/DL (ref 82–115)
POTASSIUM SERPL-SCNC: 4.6 MMOL/L (ref 3.5–5.1)
PROT SERPL-MCNC: 8.1 GM/DL (ref 5.8–7.6)
SODIUM SERPL-SCNC: 142 MMOL/L (ref 136–145)

## 2024-07-16 PROCEDURE — 3077F SYST BP >= 140 MM HG: CPT | Mod: CPTII,,, | Performed by: NURSE PRACTITIONER

## 2024-07-16 PROCEDURE — 36415 COLL VENOUS BLD VENIPUNCTURE: CPT | Performed by: NURSE PRACTITIONER

## 2024-07-16 PROCEDURE — 1125F AMNT PAIN NOTED PAIN PRSNT: CPT | Mod: CPTII,,, | Performed by: NURSE PRACTITIONER

## 2024-07-16 PROCEDURE — 1159F MED LIST DOCD IN RCRD: CPT | Mod: CPTII,,, | Performed by: NURSE PRACTITIONER

## 2024-07-16 PROCEDURE — 3288F FALL RISK ASSESSMENT DOCD: CPT | Mod: CPTII,,, | Performed by: NURSE PRACTITIONER

## 2024-07-16 PROCEDURE — 1160F RVW MEDS BY RX/DR IN RCRD: CPT | Mod: CPTII,,, | Performed by: NURSE PRACTITIONER

## 2024-07-16 PROCEDURE — 99213 OFFICE O/P EST LOW 20 MIN: CPT | Mod: PBBFAC | Performed by: NURSE PRACTITIONER

## 2024-07-16 PROCEDURE — 80053 COMPREHEN METABOLIC PANEL: CPT | Performed by: NURSE PRACTITIONER

## 2024-07-16 PROCEDURE — 3078F DIAST BP <80 MM HG: CPT | Mod: CPTII,,, | Performed by: NURSE PRACTITIONER

## 2024-07-16 PROCEDURE — 99214 OFFICE O/P EST MOD 30 MIN: CPT | Mod: S$PBB,,, | Performed by: NURSE PRACTITIONER

## 2024-07-16 PROCEDURE — 1101F PT FALLS ASSESS-DOCD LE1/YR: CPT | Mod: CPTII,,, | Performed by: NURSE PRACTITIONER

## 2024-07-16 RX ORDER — SIMVASTATIN 20 MG/1
20 TABLET, FILM COATED ORAL NIGHTLY
Qty: 90 TABLET | Refills: 0 | Status: SHIPPED | OUTPATIENT
Start: 2024-07-16 | End: 2024-10-14

## 2024-07-16 RX ORDER — METOPROLOL TARTRATE 25 MG/1
25 TABLET, FILM COATED ORAL 2 TIMES DAILY
Qty: 180 TABLET | Refills: 0 | Status: SHIPPED | OUTPATIENT
Start: 2024-07-16 | End: 2024-10-14

## 2024-07-16 RX ORDER — OMEPRAZOLE 40 MG/1
40 CAPSULE, DELAYED RELEASE ORAL EVERY MORNING
Qty: 90 CAPSULE | Refills: 0 | Status: SHIPPED | OUTPATIENT
Start: 2024-07-16

## 2024-07-16 RX ORDER — TENOFOVIR ALAFENAMIDE 25 MG/1
TABLET ORAL
Qty: 90 TABLET | Refills: 1 | Status: CANCELLED | OUTPATIENT
Start: 2024-07-16

## 2024-07-16 RX ORDER — PIOGLITAZONEHYDROCHLORIDE 30 MG/1
30 TABLET ORAL DAILY
Qty: 90 TABLET | Refills: 0 | Status: SHIPPED | OUTPATIENT
Start: 2024-07-16 | End: 2024-10-14

## 2024-07-16 RX ORDER — ENTECAVIR 0.5 MG/1
TABLET, FILM COATED ORAL
Qty: 45 TABLET | Refills: 1 | Status: SHIPPED | OUTPATIENT
Start: 2024-07-16

## 2024-07-16 NOTE — PROGRESS NOTES
Patient ID: Tonie Tejada 83 y.o.     Chief Complaint:   Chief Complaint   Patient presents with    Followup Hep B     States + knee and back pain        HPI:    7/16/24  Ms. Schilling is an 82 yo AAF presenting today for HBV f/u visit.  She is taking Vemlidy daily and tolerates it well. However, she has experienced some renal decline over the past  year and treatment revision is most appropriate at this time. Labs 4/24 HBV UD, Creatinine 1.62, GFR 31.  Will repeat CMP today and revise treatment to entecavir 0.5mg tab, take 1/2 tab daily as her daughter is concerned that taking 1 tab every other day may lead to confusion & incorrect dosing. They will split pills with a pill splitter. RUQ abd u/s completed 5/16/24, mildly coarsened echotexture noted. She denies jaundice, icterus, nausea, vomiting, dark urine, or jzaz colored stools. She is requesting refills on meds from PCP as office is closed and she is in need of refills today. Refills sent as requested. All questions answered & concerns addressed.    4/12/24  Ms. Schilling is an 82 yo AAF here today for HBV f/u visit, accompanied by her daughter Silvia. She tells me that she takes Vemlidy daily, but Silvia states that she has been missing some doses of all of her meds due to sleeping too much. She stayed with Silvia for about a month or so after hospital discharge, has been home with spouse and son since 12/23. Labs 9/23 HBV ND, AFP <2.0, 11/23 Creatinine 1.66, GR 44, AST 14, ALT 8, INR 1.2.  Will update labs today and closely monitor renal function for any need to revise treatment plan. Last U/S 2/23, repeat ordered. Denies jaundice, icterus, nausea, vomiting, dark urine, or jazz colored stools.  All questions answered & concerns addressed.     9/6/23  Ms. Schilling is a 82 yo AAF presenting today for HBV f/u visit, accompanied by Silvia.  She takes Vemlidy daily and is well controlled.  Labs 1/31/23 HBV UD.  RUQ abd u/s 2/23 WNL. FibroScan repeated 1/23  S1, F0-1.  She denies jaundice, icterus, nausea, vomiting, dark urine, or jazz colored stools. She tolerated 1st dose of Shingrix well, amenable to 2nd dose today as recommended. Today, she tells me that she has noted a rash to her left groin and under her abdomen for the past couple of months.  It does itch, but not always. Worse when active and sweating.  Has tried OTC cortisone cream, not effective.            Past Medical History:   Diagnosis Date    Chronic hepatitis B     DM (diabetes mellitus)     HLD (hyperlipidemia)     HTN (hypertension)     OA (osteoarthritis)     TIA (transient ischemic attack)         Past Surgical History:   Procedure Laterality Date     SECTION      COLONOSCOPY N/A 10/21/2023    Procedure: COLONOSCOPY;  Surgeon: Jasper Davis MD;  Location: [facility] OR;  Service: Gastroenterology;  Laterality: N/A;    COLONOSCOPY, WITH POLYPECTOMY USING SNARE  10/21/2023    Procedure: COLONOSCOPY, WITH POLYPECTOMY USING SNARE;  Surgeon: Jasper Davis MD;  Location: [facility] OR;  Service: Gastroenterology;;    DIGITAL RECTAL EXAMINATION UNDER ANESTHESIA N/A 2023    Procedure: EXAM UNDER ANESTHESIA, DIGITAL, RECTUM;  Surgeon: Jamie Garcia MD;  Location: [facility] OR;  Service: General;  Laterality: N/A;    ESOPHAGOGASTRODUODENOSCOPY N/A 10/20/2023    Procedure: EGD;  Surgeon: Cody Urbina MD;  Location: [facility] ENDOSCOPY;  Service: Gastroenterology;  Laterality: N/A;    EXAMINATION UNDER ANESTHESIA N/A 11/10/2023    Procedure: Exam under anesthesia;  Surgeon: Jamie Garcia MD;  Location: [facility] OR;  Service: Colon and Rectal;  Laterality: N/A;  Prone jackknife    EXCISIONAL HEMORRHOIDECTOMY  11/10/2023    Procedure: HEMORRHOIDECTOMY;  Surgeon: Jamie Garcia MD;  Location: [facility] OR;  Service: Colon and Rectal;;    HYSTERECTOMY      INCISION OF PERIRECTAL ABSCESS  11/10/2023    Procedure: INCISION, ABSCESS, PERIRECTAL;  Surgeon: Jamie Garcia MD;  Location:  [facility] OR;  Service: Colon and Rectal;;    JOINT REPLACEMENT          Social History     Socioeconomic History    Marital status:     Number of children: 5   Tobacco Use    Smoking status: Former    Smokeless tobacco: Never    Tobacco comments:     states a pack would last a week, quit 50 yrs ago   Substance and Sexual Activity    Alcohol use: Not Currently    Drug use: Never    Sexual activity: Not Currently     Partners: Male     Social Determinants of Health     Financial Resource Strain: Low Risk  (11/17/2023)    Overall Financial Resource Strain (CARDIA)     Difficulty of Paying Living Expenses: Not hard at all   Food Insecurity: No Food Insecurity (11/17/2023)    Hunger Vital Sign     Worried About Running Out of Food in the Last Year: Never true     Ran Out of Food in the Last Year: Never true   Transportation Needs: No Transportation Needs (11/17/2023)    PRAPARE - Transportation     Lack of Transportation (Medical): No     Lack of Transportation (Non-Medical): No   Physical Activity: Inactive (11/17/2023)    Exercise Vital Sign     Days of Exercise per Week: 0 days     Minutes of Exercise per Session: 0 min   Stress: No Stress Concern Present (11/17/2023)    [employer] - Occupational Stress Questionnaire     Feeling of Stress : Only a little   Housing Stability: Low Risk  (11/17/2023)    Housing Stability Vital Sign     Unable to Pay for Housing in the Last Year: No     Number of Places Lived in the Last Year: 1     Unstable Housing in the Last Year: No        No family history on file.     Review of patient's allergies indicates:  No Known Allergies     Immunization History   Administered Date(s) Administered    COVID-19, MRNA, LN-S, PF (MODERNA FULL 0.5 ML DOSE) 07/23/2021, 12/14/2021, 12/14/2021    Hepatitis A, Adult 02/06/2017, 08/07/2017    Hepatitis B, Adult 10/16/2017    Influenza 11/10/2014    Influenza (FLUAD) - Quadrivalent - Adjuvanted - PF *Preferred* (65+)  "01/31/2023    Influenza - High Dose - PF (65 years and older) 10/10/2016, 10/16/2017, 10/15/2018, 10/24/2019    Influenza - Quadrivalent 03/19/2021    Influenza - Quadrivalent - High Dose - PF (65 years and older) 11/04/2021    Influenza - Quadrivalent - PF *Preferred* (6 months and older) 11/19/2004, 02/24/2010    Influenza - Trivalent (ADULT) 11/19/2004, 02/24/2010    Influenza - Trivalent - PF (ADULT) 11/19/2004, 02/24/2010    Pneumococcal Conjugate - 13 Valent 02/12/2018    Pneumococcal Polysaccharide - 23 Valent 02/13/2019    Tdap 10/16/2017    Zoster Recombinant 01/31/2023, 09/06/2023        Review of Systems   Constitutional: Negative.    HENT: Negative.     Eyes: Negative.    Respiratory: Negative.     Cardiovascular: Negative.    Gastrointestinal: Negative.    Genitourinary: Negative.    Musculoskeletal: Negative.    Skin: Negative.    Neurological: Negative.    Endo/Heme/Allergies: Negative.    Psychiatric/Behavioral: Negative.     All other systems reviewed and are negative.         Objective:      BP (!) 160/68   Pulse 61   Temp 97.8 °F (36.6 °C) (Oral)   Resp 12   Ht 5' 2" (1.575 m)   Wt 79.4 kg (175 lb 0.7 oz)   BMI 32.02 kg/m²      Physical Exam  Vitals reviewed.   Constitutional:       General: She is not in acute distress.     Appearance: Normal appearance. She is not toxic-appearing.   Eyes:      General: No scleral icterus.  Cardiovascular:      Rate and Rhythm: Normal rate and regular rhythm.      Heart sounds: Normal heart sounds.   Pulmonary:      Effort: Pulmonary effort is normal. No respiratory distress.      Breath sounds: Normal breath sounds.   Abdominal:      General: Bowel sounds are normal. There is no distension.      Palpations: Abdomen is soft. There is no mass.      Tenderness: There is no abdominal tenderness.   Musculoskeletal:         General: Normal range of motion.   Skin:     General: Skin is warm and dry.   Neurological:      Mental Status: She is alert and oriented to " person, place, and time.          Labs:   Lab Results   Component Value Date    WBC 6.34 04/12/2024    HGB 11.7 (L) 04/12/2024    HCT 34.9 (L) 04/12/2024    MCV 94.6 (H) 04/12/2024     04/12/2024       CMP  Sodium   Date Value Ref Range Status   07/16/2024 142 136 - 145 mmol/L Final     Potassium   Date Value Ref Range Status   07/16/2024 4.6 3.5 - 5.1 mmol/L Final     Chloride   Date Value Ref Range Status   07/16/2024 108 (H) 98 - 107 mmol/L Final     CO2   Date Value Ref Range Status   07/16/2024 24 23 - 31 mmol/L Final     Blood Urea Nitrogen   Date Value Ref Range Status   07/16/2024 29.4 (H) 9.8 - 20.1 mg/dL Final     Creatinine   Date Value Ref Range Status   07/16/2024 1.75 (H) 0.55 - 1.02 mg/dL Final     Calcium   Date Value Ref Range Status   07/16/2024 9.9 8.4 - 10.2 mg/dL Final     Albumin   Date Value Ref Range Status   07/16/2024 4.1 3.4 - 4.8 g/dL Final     Bilirubin Total   Date Value Ref Range Status   07/16/2024 0.7 <=1.5 mg/dL Final     ALP   Date Value Ref Range Status   07/16/2024 83 40 - 150 unit/L Final     AST   Date Value Ref Range Status   07/16/2024 15 5 - 34 unit/L Final     ALT   Date Value Ref Range Status   07/16/2024 11 0 - 55 unit/L Final     eGFR   Date Value Ref Range Status   07/16/2024 28 mL/min/1.73/m2 Final     Lab Results   Component Value Date    TSH 1.581 01/31/2023     HIV   Date Value Ref Range Status   01/31/2023 Nonreactive Nonreactive Final     INR   Date Value Ref Range Status   04/12/2024 1.1 <=1.3 Final     Syphilis Antibody   Date Value Ref Range Status   01/31/2023 Reactive (A) Nonreactive, Equivocal Final     Hep C Ab Interp   Date Value Ref Range Status   01/31/2023 Nonreactive Nonreactive Final     Hep BsAg Conf   Date Value Ref Range Status   01/31/2023 Confirmed Positive  Final     Results for orders placed or performed in visit on 01/31/23   Hepatitis B e Antigen   Result Value Ref Range    Hepatitis Be Ag, S Negative Negative     Results for orders  placed or performed in visit on 01/31/23   Hepatitis B e antibody   Result Value Ref Range    HBe Antibody Positive (A) Negative     Results for orders placed or performed in visit on 04/12/24   Hepatitis B DNA, Quant   Result Value Ref Range    HBV DNA Detect/Quant Undetected Undetected IU/mL       Imaging: Reviewed most recent relevant imaging studies available, notable results highlighted in this note      Medications:     Current Outpatient Medications   Medication Instructions    albuterol (PROVENTIL/VENTOLIN HFA) 90 mcg/actuation inhaler 1 puff, Every 4 hours PRN    amLODIPine (NORVASC) 5 mg, Oral, Daily    entecavir (BARACLUDE) 0.5 MG Tab Take 1/2 tab daily    hydrocortisone (ANUSOL-HC) 2.5 % rectal cream Rectal, 2 times daily    metoprolol tartrate (LOPRESSOR) 25 mg, Oral, 2 times daily    omeprazole (PRILOSEC) 40 mg, Oral, Every morning    pioglitazone (ACTOS) 30 mg, Oral, Daily    simvastatin (ZOCOR) 20 mg, Oral, Nightly       Assessment:       Problem List Items Addressed This Visit          Cardiac/Vascular    HTN (hypertension)    Relevant Medications    metoprolol tartrate (LOPRESSOR) 25 MG tablet    HLD (hyperlipidemia)    Relevant Medications    simvastatin (ZOCOR) 20 MG tablet       Endocrine    Diabetes mellitus    Relevant Medications    pioglitazone (ACTOS) 30 MG tablet       GI    GERD (gastroesophageal reflux disease)    Relevant Medications    omeprazole (PRILOSEC) 40 MG capsule     Other Visit Diagnoses       Chronic hepatitis B    -  Primary    Relevant Medications    entecavir (BARACLUDE) 0.5 MG Tab    Other Relevant Orders    Comprehensive Metabolic Panel (Completed)    Stage 3b chronic kidney disease                   Plan:      Chronic hepatitis B  Stage 3b chronic kidney disease  -     entecavir (BARACLUDE) 0.5 MG Tab; Take 1/2 tab daily  Dispense: 45 tablet; Refill: 1  -     Comprehensive Metabolic Panel  Blood precautions, do not share a razor, needle, toothbrush, or clippers with  anyone.    Use condoms for sexual encounters.   Vaccination of all household members and close contacts strongly encouraged.  Avoid or minimize all alcohol intake.   Limit Tylenol use to 2 grams per day.   RUQ abdominal ultrasound every 6-12 months for liver cancer screening.   Lower fat diet, higher fiber diet.   Discontinue Vemlidy 25 mg daily s/t decreased renal function.  Replace with Entecavir 0.5 mg, take 1/2 tab daily. Use pill splitter for even split. Avoid treatment interruptions which can lead to viral rebound and acute hepatitis infection.  Labs today as ordered.  FibroScan 11/21 S 2-3, F 0-1; 1/23 S1, F0-1.  RUQ abd u/s 5/16/24 Normal in size, measuring 12.2 cm. Questionable mildly coarse echotexture. No focal hepatic lesions. Normal hepatopetal portal venous flow.   RTC 2 months with Rhianna.    Primary hypertension  -     metoprolol tartrate (LOPRESSOR) 25 MG tablet; Take 1 tablet (25 mg total) by mouth 2 (two) times daily.  Dispense: 180 tablet; Refill: 0  Refill provided as requested.   Keep f/u with PCP for additional refills.     Hyperlipidemia, unspecified hyperlipidemia type  -     simvastatin (ZOCOR) 20 MG tablet; Take 1 tablet (20 mg total) by mouth every evening.  Dispense: 90 tablet; Refill: 0  Refill provided as requested.   Keep f/u with PCP for additional refills.     Gastroesophageal reflux disease, unspecified whether esophagitis present  -     omeprazole (PRILOSEC) 40 MG capsule; Take 1 capsule (40 mg total) by mouth every morning.  Dispense: 90 capsule; Refill: 0  Refill provided as requested.   Keep f/u with PCP for additional refills.     Type 2 diabetes mellitus without complication, unspecified whether long term insulin use  -     pioglitazone (ACTOS) 30 MG tablet; Take 1 tablet (30 mg total) by mouth once daily.  Dispense: 90 tablet; Refill: 0  Refill provided as requested.   Keep f/u with PCP for additional refills.

## 2024-07-19 ENCOUNTER — TELEPHONE (OUTPATIENT)
Dept: INFECTIOUS DISEASES | Facility: CLINIC | Age: 84
End: 2024-07-19
Payer: MEDICARE

## 2024-07-19 DIAGNOSIS — N18.32 STAGE 3B CHRONIC KIDNEY DISEASE: Primary | ICD-10-CM

## 2024-07-19 NOTE — TELEPHONE ENCOUNTER
Results reviewed.  Creatinine further increased slightly to 1.75, GFR 28.  Entecavir recommended dose now is 0.5 mg tablet every 3rd day.  Spoke with patient and her daughter Zahra.  There is valid concern that she will have trouble keeping up with every 3rd day dose schedule. Will remain with Entecavir 0.5 mg 1/2 tab daily as planned and repeat BMP in 2 weeks for close monitoring. She will go to [facility] for lab collection.  Please send orders and confirm with Zahra that orders are sent.     Renal friendly recommendations:   Low sodium (<2 grams per day), moderate protein intake.  Glucose and BP control optimization.  Avoid protein supplements and NSAIDS (Advil, ibuprofen, Aleve, Mobic, etc.)  Keep hydrated, drink plenty of water.  Declines offer to refer to renal provider at this time.

## 2024-07-19 NOTE — TELEPHONE ENCOUNTER
BMP order printed and faxed to [facility]. Confirmed with fax verification sheet. Phoned patient's daughter, Silvia, of order faxed and to have lab completed in approx 2 weeks. Voiced understanding and appreciates call.

## 2024-07-19 NOTE — TELEPHONE ENCOUNTER
----- Message from Dinorah Mcrae sent at 7/19/2024 10:33 AM CDT -----  Pt of Rhianna      Pt daughter Zahra called stating she's returning a missed call from Rhianna.    Zahra number [phone]      Please advise

## 2024-08-16 ENCOUNTER — TELEPHONE (OUTPATIENT)
Dept: INFECTIOUS DISEASES | Facility: CLINIC | Age: 84
End: 2024-08-16
Payer: MEDICARE

## 2024-08-16 NOTE — TELEPHONE ENCOUNTER
Repeat lab results reviewed. Creatinine improved back to baseline at 1.65.  Continue entecavir 0.5 mg daily as prescribed. Please notify pt and/or daughter. Thank you.

## 2025-01-08 DIAGNOSIS — B18.1 CHRONIC HEPATITIS B: ICD-10-CM

## 2025-01-08 RX ORDER — ENTECAVIR 0.5 MG/1
TABLET, FILM COATED ORAL
Qty: 45 TABLET | Refills: 0 | Status: SHIPPED | OUTPATIENT
Start: 2025-01-08

## 2025-01-09 NOTE — TELEPHONE ENCOUNTER
Please contact patient or daughter for appt schediling. Thank you.   
Rx sent to pharmacy electronically per provider, Rhianna Pierce NP. Phoned patient. No answer. Message left on voice mail to notify of rx refill and the need to attend upcoming appt for additional refills.   
PAIN/TENDERNESS

## 2025-01-17 ENCOUNTER — HOSPITAL ENCOUNTER (INPATIENT)
Facility: HOSPITAL | Age: 85
LOS: 6 days | Discharge: HOME HEALTH | DRG: 865 | End: 2025-01-23
Attending: EMERGENCY MEDICINE | Admitting: INTERNAL MEDICINE
Payer: MEDICARE

## 2025-01-17 DIAGNOSIS — J10.1 INFLUENZA A: ICD-10-CM

## 2025-01-17 DIAGNOSIS — R50.9 FEVER, UNSPECIFIED FEVER CAUSE: ICD-10-CM

## 2025-01-17 DIAGNOSIS — N39.0 UTI (URINARY TRACT INFECTION): ICD-10-CM

## 2025-01-17 DIAGNOSIS — R41.82 AMS (ALTERED MENTAL STATUS): ICD-10-CM

## 2025-01-17 DIAGNOSIS — R07.9 CHEST PAIN: ICD-10-CM

## 2025-01-17 DIAGNOSIS — R41.82 ALTERED MENTAL STATUS, UNSPECIFIED ALTERED MENTAL STATUS TYPE: Primary | ICD-10-CM

## 2025-01-17 DIAGNOSIS — E11.9 TYPE 2 DIABETES MELLITUS WITHOUT COMPLICATION, UNSPECIFIED WHETHER LONG TERM INSULIN USE: ICD-10-CM

## 2025-01-17 LAB
ALBUMIN SERPL-MCNC: 3.8 G/DL (ref 3.4–4.8)
ALBUMIN/GLOB SERPL: 1 RATIO (ref 1.1–2)
ALP SERPL-CCNC: 75 UNIT/L (ref 40–150)
ALT SERPL-CCNC: 8 UNIT/L (ref 0–55)
ANION GAP SERPL CALC-SCNC: 10 MEQ/L
AST SERPL-CCNC: 15 UNIT/L (ref 5–34)
BACTERIA #/AREA URNS AUTO: ABNORMAL /HPF
BASOPHILS # BLD AUTO: 0.03 X10(3)/MCL
BASOPHILS NFR BLD AUTO: 0.3 %
BILIRUB SERPL-MCNC: 0.6 MG/DL
BILIRUB UR QL STRIP.AUTO: NEGATIVE
BUN SERPL-MCNC: 26.5 MG/DL (ref 9.8–20.1)
CALCIUM SERPL-MCNC: 9.2 MG/DL (ref 8.4–10.2)
CHLORIDE SERPL-SCNC: 112 MMOL/L (ref 98–107)
CLARITY UR: CLEAR
CO2 SERPL-SCNC: 18 MMOL/L (ref 23–31)
COLOR UR AUTO: ABNORMAL
CREAT SERPL-MCNC: 1.83 MG/DL (ref 0.55–1.02)
CREAT/UREA NIT SERPL: 14
EOSINOPHIL # BLD AUTO: 0 X10(3)/MCL (ref 0–0.9)
EOSINOPHIL NFR BLD AUTO: 0 %
ERYTHROCYTE [DISTWIDTH] IN BLOOD BY AUTOMATED COUNT: 12.2 % (ref 11.5–17)
FLUAV AG UPPER RESP QL IA.RAPID: DETECTED
FLUBV AG UPPER RESP QL IA.RAPID: NOT DETECTED
GFR SERPLBLD CREATININE-BSD FMLA CKD-EPI: 27 ML/MIN/1.73/M2
GLOBULIN SER-MCNC: 3.8 GM/DL (ref 2.4–3.5)
GLUCOSE SERPL-MCNC: 153 MG/DL (ref 82–115)
GLUCOSE UR QL STRIP: NORMAL
HCT VFR BLD AUTO: 34.2 % (ref 37–47)
HGB BLD-MCNC: 11.1 G/DL (ref 12–16)
HGB UR QL STRIP: NEGATIVE
IMM GRANULOCYTES # BLD AUTO: 0.03 X10(3)/MCL (ref 0–0.04)
IMM GRANULOCYTES NFR BLD AUTO: 0.3 %
KETONES UR QL STRIP: NEGATIVE
LEUKOCYTE ESTERASE UR QL STRIP: NEGATIVE
LYMPHOCYTES # BLD AUTO: 0.48 X10(3)/MCL (ref 0.6–4.6)
LYMPHOCYTES NFR BLD AUTO: 4.9 %
MCH RBC QN AUTO: 30.7 PG (ref 27–31)
MCHC RBC AUTO-ENTMCNC: 32.5 G/DL (ref 33–36)
MCV RBC AUTO: 94.7 FL (ref 80–94)
MONOCYTES # BLD AUTO: 0.93 X10(3)/MCL (ref 0.1–1.3)
MONOCYTES NFR BLD AUTO: 9.4 %
MUCOUS THREADS URNS QL MICRO: ABNORMAL /LPF
NEUTROPHILS # BLD AUTO: 8.38 X10(3)/MCL (ref 2.1–9.2)
NEUTROPHILS NFR BLD AUTO: 85.1 %
NITRITE UR QL STRIP: NEGATIVE
NRBC BLD AUTO-RTO: 0 %
PH UR STRIP: 5.5 [PH]
PLATELET # BLD AUTO: 139 X10(3)/MCL (ref 130–400)
PLATELETS.RETICULATED NFR BLD AUTO: 8.9 % (ref 0.9–11.2)
PMV BLD AUTO: 12.6 FL (ref 7.4–10.4)
POCT GLUCOSE: 189 MG/DL (ref 70–110)
POTASSIUM SERPL-SCNC: 4.4 MMOL/L (ref 3.5–5.1)
PROT SERPL-MCNC: 7.6 GM/DL (ref 5.8–7.6)
PROT UR QL STRIP: ABNORMAL
RBC # BLD AUTO: 3.61 X10(6)/MCL (ref 4.2–5.4)
RBC #/AREA URNS AUTO: ABNORMAL /HPF
RSV A 5' UTR RNA NPH QL NAA+PROBE: NOT DETECTED
SARS-COV-2 RNA RESP QL NAA+PROBE: NOT DETECTED
SODIUM SERPL-SCNC: 140 MMOL/L (ref 136–145)
SP GR UR STRIP.AUTO: 1.01 (ref 1–1.03)
SQUAMOUS #/AREA URNS LPF: ABNORMAL /HPF
TROPONIN I SERPL-MCNC: 0.03 NG/ML (ref 0–0.04)
TSH SERPL-ACNC: 0.8 UIU/ML (ref 0.35–4.94)
UROBILINOGEN UR STRIP-ACNC: NORMAL
WBC # BLD AUTO: 9.85 X10(3)/MCL (ref 4.5–11.5)
WBC #/AREA URNS AUTO: ABNORMAL /HPF

## 2025-01-17 PROCEDURE — 25000003 PHARM REV CODE 250: Performed by: EMERGENCY MEDICINE

## 2025-01-17 PROCEDURE — 93010 ELECTROCARDIOGRAM REPORT: CPT | Mod: ,,, | Performed by: INTERNAL MEDICINE

## 2025-01-17 PROCEDURE — 63600175 PHARM REV CODE 636 W HCPCS: Performed by: EMERGENCY MEDICINE

## 2025-01-17 PROCEDURE — 0241U COVID/RSV/FLU A&B PCR: CPT

## 2025-01-17 PROCEDURE — 11000001 HC ACUTE MED/SURG PRIVATE ROOM

## 2025-01-17 PROCEDURE — 80053 COMPREHEN METABOLIC PANEL: CPT

## 2025-01-17 PROCEDURE — 81001 URINALYSIS AUTO W/SCOPE: CPT

## 2025-01-17 PROCEDURE — 84484 ASSAY OF TROPONIN QUANT: CPT

## 2025-01-17 PROCEDURE — 87040 BLOOD CULTURE FOR BACTERIA: CPT | Performed by: EMERGENCY MEDICINE

## 2025-01-17 PROCEDURE — 93005 ELECTROCARDIOGRAM TRACING: CPT

## 2025-01-17 PROCEDURE — 85025 COMPLETE CBC W/AUTO DIFF WBC: CPT

## 2025-01-17 PROCEDURE — 84443 ASSAY THYROID STIM HORMONE: CPT

## 2025-01-17 RX ORDER — OSELTAMIVIR PHOSPHATE 75 MG/1
75 CAPSULE ORAL
Status: COMPLETED | OUTPATIENT
Start: 2025-01-17 | End: 2025-01-17

## 2025-01-17 RX ORDER — AMLODIPINE BESYLATE 5 MG/1
5 TABLET ORAL DAILY
COMMUNITY

## 2025-01-17 RX ORDER — CEFTRIAXONE 1 G/1
1 INJECTION, POWDER, FOR SOLUTION INTRAMUSCULAR; INTRAVENOUS
Status: COMPLETED | OUTPATIENT
Start: 2025-01-17 | End: 2025-01-17

## 2025-01-17 RX ORDER — METOPROLOL TARTRATE 25 MG/1
25 TABLET, FILM COATED ORAL 2 TIMES DAILY
COMMUNITY

## 2025-01-17 RX ORDER — ACETAMINOPHEN 500 MG
1000 TABLET ORAL
Status: COMPLETED | OUTPATIENT
Start: 2025-01-17 | End: 2025-01-17

## 2025-01-17 RX ORDER — PIOGLITAZONEHYDROCHLORIDE 30 MG/1
30 TABLET ORAL DAILY
COMMUNITY

## 2025-01-17 RX ORDER — SIMVASTATIN 20 MG/1
20 TABLET, FILM COATED ORAL NIGHTLY
COMMUNITY

## 2025-01-17 RX ORDER — ACETAMINOPHEN 325 MG/1
650 TABLET ORAL
Status: DISCONTINUED | OUTPATIENT
Start: 2025-01-17 | End: 2025-01-17

## 2025-01-17 RX ADMIN — ACETAMINOPHEN 1000 MG: 500 TABLET ORAL at 08:01

## 2025-01-17 RX ADMIN — CEFTRIAXONE SODIUM 1 G: 1 INJECTION, POWDER, FOR SOLUTION INTRAMUSCULAR; INTRAVENOUS at 09:01

## 2025-01-17 RX ADMIN — OSELTAMAVIR PHOSPHATE 75 MG: 75 CAPSULE ORAL at 08:01

## 2025-01-17 NOTE — FIRST PROVIDER EVALUATION
"Medical screening examination initiated.  I have conducted a focused provider triage encounter, findings are as follows:    Brief history of present illness:  arrived to ED due to AMS and possible fall. LSN 01/16/24 (in the morning). Patient's fall unwitnessed.     Vitals:    01/17/25 1515   BP: (!) 136/53   BP Location: Left arm   Pulse: 74   Resp: 20   Temp: 98.3 °F (36.8 °C)   TempSrc: Oral   SpO2: 98%   Height: 5' 1" (1.549 m)       Pertinent physical exam:  awake, alert, has non-labored breathing, GCS 13, low grade temp noted.    Brief workup plan:  labs, imaging, EKG, medication      Preliminary workup initiated; this workup will be continued and followed by the physician or advanced practice provider that is assigned to the patient when roomed.  "

## 2025-01-17 NOTE — Clinical Note
Diagnosis: UTI (urinary tract infection) [438458]   Future Attending Provider: NOA RODRÍGUEZ [[id number]]   Admit to which facility:: [facility] [[id number]]   Reason for IP Medical Treatment  (Clinical interventions that can only be accomplished in the IP setting? ) :: inpatient services necessary   Estimated Length of Stay:: 2 midnights   Plans for Post-Acute care--if anticipated (pick the single best option):: A. No post acute care anticipated at this time

## 2025-01-18 LAB
ALBUMIN SERPL-MCNC: 3.9 G/DL (ref 3.4–4.8)
ALBUMIN/GLOB SERPL: 1.1 RATIO (ref 1.1–2)
ALP SERPL-CCNC: 77 UNIT/L (ref 40–150)
ALT SERPL-CCNC: 8 UNIT/L (ref 0–55)
ANION GAP SERPL CALC-SCNC: 11 MEQ/L
AST SERPL-CCNC: 23 UNIT/L (ref 5–34)
BASOPHILS # BLD AUTO: 0.02 X10(3)/MCL
BASOPHILS NFR BLD AUTO: 0.3 %
BILIRUB SERPL-MCNC: 0.7 MG/DL
BUN SERPL-MCNC: 28.9 MG/DL (ref 9.8–20.1)
CALCIUM SERPL-MCNC: 8.9 MG/DL (ref 8.4–10.2)
CHLORIDE SERPL-SCNC: 111 MMOL/L (ref 98–107)
CK SERPL-CCNC: 206 U/L (ref 29–168)
CO2 SERPL-SCNC: 20 MMOL/L (ref 23–31)
CREAT SERPL-MCNC: 1.8 MG/DL (ref 0.55–1.02)
CREAT/UREA NIT SERPL: 16
EOSINOPHIL # BLD AUTO: 0.01 X10(3)/MCL (ref 0–0.9)
EOSINOPHIL NFR BLD AUTO: 0.2 %
ERYTHROCYTE [DISTWIDTH] IN BLOOD BY AUTOMATED COUNT: 12.3 % (ref 11.5–17)
GFR SERPLBLD CREATININE-BSD FMLA CKD-EPI: 27 ML/MIN/1.73/M2
GLOBULIN SER-MCNC: 3.5 GM/DL (ref 2.4–3.5)
GLUCOSE SERPL-MCNC: 101 MG/DL (ref 82–115)
GLUCOSE SERPL-MCNC: 91 MG/DL (ref 70–110)
HCT VFR BLD AUTO: 35.6 % (ref 37–47)
HGB BLD-MCNC: 11.5 G/DL (ref 12–16)
IMM GRANULOCYTES # BLD AUTO: 0.02 X10(3)/MCL (ref 0–0.04)
IMM GRANULOCYTES NFR BLD AUTO: 0.3 %
LYMPHOCYTES # BLD AUTO: 0.69 X10(3)/MCL (ref 0.6–4.6)
LYMPHOCYTES NFR BLD AUTO: 10.4 %
MAGNESIUM SERPL-MCNC: 2.3 MG/DL (ref 1.6–2.6)
MCH RBC QN AUTO: 31 PG (ref 27–31)
MCHC RBC AUTO-ENTMCNC: 32.3 G/DL (ref 33–36)
MCV RBC AUTO: 96 FL (ref 80–94)
MONOCYTES # BLD AUTO: 0.91 X10(3)/MCL (ref 0.1–1.3)
MONOCYTES NFR BLD AUTO: 13.7 %
NEUTROPHILS # BLD AUTO: 4.97 X10(3)/MCL (ref 2.1–9.2)
NEUTROPHILS NFR BLD AUTO: 75.1 %
NRBC BLD AUTO-RTO: 0 %
PLATELET # BLD AUTO: 121 X10(3)/MCL (ref 130–400)
PMV BLD AUTO: 12.6 FL (ref 7.4–10.4)
POCT GLUCOSE: 104 MG/DL (ref 70–110)
POCT GLUCOSE: 91 MG/DL (ref 70–110)
POCT GLUCOSE: 94 MG/DL (ref 70–110)
POCT GLUCOSE: 96 MG/DL (ref 70–110)
POTASSIUM SERPL-SCNC: 4.4 MMOL/L (ref 3.5–5.1)
PROT SERPL-MCNC: 7.4 GM/DL (ref 5.8–7.6)
RBC # BLD AUTO: 3.71 X10(6)/MCL (ref 4.2–5.4)
SODIUM SERPL-SCNC: 142 MMOL/L (ref 136–145)
TROPONIN I SERPL-MCNC: 0.03 NG/ML (ref 0–0.04)
WBC # BLD AUTO: 6.62 X10(3)/MCL (ref 4.5–11.5)

## 2025-01-18 PROCEDURE — 83735 ASSAY OF MAGNESIUM: CPT

## 2025-01-18 PROCEDURE — 11000001 HC ACUTE MED/SURG PRIVATE ROOM

## 2025-01-18 PROCEDURE — 85025 COMPLETE CBC W/AUTO DIFF WBC: CPT

## 2025-01-18 PROCEDURE — 25000003 PHARM REV CODE 250: Performed by: INTERNAL MEDICINE

## 2025-01-18 PROCEDURE — 36415 COLL VENOUS BLD VENIPUNCTURE: CPT

## 2025-01-18 PROCEDURE — 63600175 PHARM REV CODE 636 W HCPCS: Performed by: INTERNAL MEDICINE

## 2025-01-18 PROCEDURE — 80053 COMPREHEN METABOLIC PANEL: CPT

## 2025-01-18 PROCEDURE — 82550 ASSAY OF CK (CPK): CPT

## 2025-01-18 PROCEDURE — 25000003 PHARM REV CODE 250

## 2025-01-18 PROCEDURE — 21400001 HC TELEMETRY ROOM

## 2025-01-18 PROCEDURE — 84484 ASSAY OF TROPONIN QUANT: CPT

## 2025-01-18 RX ORDER — ACETAMINOPHEN 500 MG
1000 TABLET ORAL EVERY 6 HOURS PRN
Status: DISCONTINUED | OUTPATIENT
Start: 2025-01-18 | End: 2025-01-23 | Stop reason: HOSPADM

## 2025-01-18 RX ORDER — PANTOPRAZOLE SODIUM 40 MG/1
40 TABLET, DELAYED RELEASE ORAL DAILY
Status: DISCONTINUED | OUTPATIENT
Start: 2025-01-18 | End: 2025-01-23 | Stop reason: HOSPADM

## 2025-01-18 RX ORDER — INSULIN ASPART 100 [IU]/ML
0-10 INJECTION, SOLUTION INTRAVENOUS; SUBCUTANEOUS
Status: DISCONTINUED | OUTPATIENT
Start: 2025-01-18 | End: 2025-01-23 | Stop reason: HOSPADM

## 2025-01-18 RX ORDER — GUAIFENESIN 600 MG/1
600 TABLET, EXTENDED RELEASE ORAL 2 TIMES DAILY
Status: DISCONTINUED | OUTPATIENT
Start: 2025-01-18 | End: 2025-01-23 | Stop reason: HOSPADM

## 2025-01-18 RX ORDER — BENZONATATE 100 MG/1
100 CAPSULE ORAL 3 TIMES DAILY PRN
Status: DISCONTINUED | OUTPATIENT
Start: 2025-01-18 | End: 2025-01-23 | Stop reason: HOSPADM

## 2025-01-18 RX ORDER — SODIUM CHLORIDE 9 MG/ML
INJECTION, SOLUTION INTRAVENOUS CONTINUOUS
Status: ACTIVE | OUTPATIENT
Start: 2025-01-18 | End: 2025-01-18

## 2025-01-18 RX ORDER — BISACODYL 10 MG/1
10 SUPPOSITORY RECTAL DAILY PRN
Status: DISCONTINUED | OUTPATIENT
Start: 2025-01-18 | End: 2025-01-23 | Stop reason: HOSPADM

## 2025-01-18 RX ORDER — IBUPROFEN 200 MG
24 TABLET ORAL
Status: DISCONTINUED | OUTPATIENT
Start: 2025-01-18 | End: 2025-01-23 | Stop reason: HOSPADM

## 2025-01-18 RX ORDER — POLYETHYLENE GLYCOL 3350 17 G/17G
17 POWDER, FOR SOLUTION ORAL 2 TIMES DAILY PRN
Status: DISCONTINUED | OUTPATIENT
Start: 2025-01-18 | End: 2025-01-23 | Stop reason: HOSPADM

## 2025-01-18 RX ORDER — GLUCAGON 1 MG
1 KIT INJECTION
Status: DISCONTINUED | OUTPATIENT
Start: 2025-01-18 | End: 2025-01-23 | Stop reason: HOSPADM

## 2025-01-18 RX ORDER — IBUPROFEN 200 MG
16 TABLET ORAL
Status: DISCONTINUED | OUTPATIENT
Start: 2025-01-18 | End: 2025-01-23 | Stop reason: HOSPADM

## 2025-01-18 RX ORDER — SODIUM CHLORIDE 0.9 % (FLUSH) 0.9 %
10 SYRINGE (ML) INJECTION
Status: DISCONTINUED | OUTPATIENT
Start: 2025-01-18 | End: 2025-01-23 | Stop reason: HOSPADM

## 2025-01-18 RX ORDER — ONDANSETRON HYDROCHLORIDE 2 MG/ML
4 INJECTION, SOLUTION INTRAVENOUS EVERY 4 HOURS PRN
Status: DISCONTINUED | OUTPATIENT
Start: 2025-01-18 | End: 2025-01-23 | Stop reason: HOSPADM

## 2025-01-18 RX ORDER — ENOXAPARIN SODIUM 100 MG/ML
30 INJECTION SUBCUTANEOUS EVERY 24 HOURS
Status: DISCONTINUED | OUTPATIENT
Start: 2025-01-18 | End: 2025-01-23 | Stop reason: HOSPADM

## 2025-01-18 RX ORDER — AMLODIPINE BESYLATE 5 MG/1
5 TABLET ORAL DAILY
Status: DISCONTINUED | OUTPATIENT
Start: 2025-01-18 | End: 2025-01-23 | Stop reason: HOSPADM

## 2025-01-18 RX ORDER — OSELTAMIVIR PHOSPHATE 30 MG/1
30 CAPSULE ORAL DAILY
Status: COMPLETED | OUTPATIENT
Start: 2025-01-18 | End: 2025-01-22

## 2025-01-18 RX ORDER — ALUMINUM HYDROXIDE, MAGNESIUM HYDROXIDE, AND SIMETHICONE 1200; 120; 1200 MG/30ML; MG/30ML; MG/30ML
30 SUSPENSION ORAL 4 TIMES DAILY PRN
Status: DISCONTINUED | OUTPATIENT
Start: 2025-01-18 | End: 2025-01-23 | Stop reason: HOSPADM

## 2025-01-18 RX ORDER — ATORVASTATIN CALCIUM 40 MG/1
40 TABLET, FILM COATED ORAL NIGHTLY
Status: DISCONTINUED | OUTPATIENT
Start: 2025-01-18 | End: 2025-01-23 | Stop reason: HOSPADM

## 2025-01-18 RX ORDER — ALBUTEROL SULFATE 90 UG/1
1 INHALANT RESPIRATORY (INHALATION) EVERY 4 HOURS PRN
Status: DISCONTINUED | OUTPATIENT
Start: 2025-01-18 | End: 2025-01-23 | Stop reason: HOSPADM

## 2025-01-18 RX ORDER — METOPROLOL TARTRATE 25 MG/1
25 TABLET, FILM COATED ORAL 2 TIMES DAILY
Status: DISCONTINUED | OUTPATIENT
Start: 2025-01-18 | End: 2025-01-23 | Stop reason: HOSPADM

## 2025-01-18 RX ORDER — TALC
6 POWDER (GRAM) TOPICAL NIGHTLY PRN
Status: DISCONTINUED | OUTPATIENT
Start: 2025-01-18 | End: 2025-01-23 | Stop reason: HOSPADM

## 2025-01-18 RX ADMIN — GUAIFENESIN 600 MG: 600 TABLET, EXTENDED RELEASE ORAL at 08:01

## 2025-01-18 RX ADMIN — ATORVASTATIN CALCIUM 40 MG: 40 TABLET, FILM COATED ORAL at 08:01

## 2025-01-18 RX ADMIN — BENZONATATE 100 MG: 100 CAPSULE ORAL at 08:01

## 2025-01-18 RX ADMIN — PANTOPRAZOLE SODIUM 40 MG: 40 TABLET, DELAYED RELEASE ORAL at 09:01

## 2025-01-18 RX ADMIN — SODIUM CHLORIDE: 9 INJECTION, SOLUTION INTRAVENOUS at 04:01

## 2025-01-18 RX ADMIN — OSELTAMIVIR PHOSPHATE 30 MG: 30 CAPSULE ORAL at 09:01

## 2025-01-18 RX ADMIN — GUAIFENESIN 600 MG: 600 TABLET, EXTENDED RELEASE ORAL at 12:01

## 2025-01-18 RX ADMIN — METOPROLOL TARTRATE 25 MG: 25 TABLET, FILM COATED ORAL at 09:01

## 2025-01-18 RX ADMIN — ENOXAPARIN SODIUM 30 MG: 30 INJECTION SUBCUTANEOUS at 04:01

## 2025-01-18 RX ADMIN — AMLODIPINE BESYLATE 5 MG: 5 TABLET ORAL at 09:01

## 2025-01-18 NOTE — NURSING
Patient remained confused, unable to answer question correctly, unable to complete assessment , tried calling patient's daughter but no answer.

## 2025-01-18 NOTE — ED NOTES
I called the patient's daughter Silvia to update her on the patient's status and to inform her of the room the patient is assigned.

## 2025-01-18 NOTE — ED PROVIDER NOTES
Encounter Date: 2025    SCRIBE #1 NOTE: I, Zahra Paredes, am scribing for, and in the presence of,  Tu Green MD. I have scribed the following portions of the note - Other sections scribed: HPI, ROS, PE.       History     Chief Complaint   Patient presents with    Altered Mental Status     Pt had unwitnessed fall last night. Family member called at 11 am and noticed pt was altered. Baseline GCS 15. . Last seen normal yesterday morning. GCS 13 in triage     Patient is an 84-year-old female with a history of HLD, HTN, and DM presenting to the ED for altered mental status. Per the pt's granddaughter who is bedside, the pt was last seen normal yesterday morning. The pt's granddaughter states that the pt had a fall sometime within the past 24 hours while alone at her house. She states that a family member called the pt this morning to check on her and noticed that she was more confused than usual, prompting them to bring her to the ED for evaluation. She states that the pt has had similar episodes of confusion in the past when she has had a UTI. Associated symptoms include generalized weakness and fever of 102.8 in the ED. She denies any other complaints.    The history is provided by a relative and the patient. No  was used.     Review of patient's allergies indicates:  No Known Allergies  Past Medical History:   Diagnosis Date    Chronic hepatitis B     DM (diabetes mellitus)     HLD (hyperlipidemia)     HTN (hypertension)     OA (osteoarthritis)     TIA (transient ischemic attack)      Past Surgical History:   Procedure Laterality Date     SECTION      COLONOSCOPY N/A 10/21/2023    Procedure: COLONOSCOPY;  Surgeon: Jasper Davis MD;  Location: [facility];  Service: Gastroenterology;  Laterality: N/A;    COLONOSCOPY, WITH POLYPECTOMY USING SNARE  10/21/2023    Procedure: COLONOSCOPY, WITH POLYPECTOMY USING SNARE;  Surgeon: Jasper Davis MD;  Location: [facility];   Service: Gastroenterology;;    DIGITAL RECTAL EXAMINATION UNDER ANESTHESIA N/A 11/14/2023    Procedure: EXAM UNDER ANESTHESIA, DIGITAL, RECTUM;  Surgeon: Jamie Garcia MD;  Location: [facility] OR;  Service: General;  Laterality: N/A;    ESOPHAGOGASTRODUODENOSCOPY N/A 10/20/2023    Procedure: EGD;  Surgeon: Cody Urbina MD;  Location: [facility] ENDOSCOPY;  Service: Gastroenterology;  Laterality: N/A;    EXAMINATION UNDER ANESTHESIA N/A 11/10/2023    Procedure: Exam under anesthesia;  Surgeon: Jamie Garcia MD;  Location: [facility] OR;  Service: Colon and Rectal;  Laterality: N/A;  Prone jackknife    EXCISIONAL HEMORRHOIDECTOMY  11/10/2023    Procedure: HEMORRHOIDECTOMY;  Surgeon: Jamie Garcia MD;  Location: [facility] OR;  Service: Colon and Rectal;;    HYSTERECTOMY      INCISION OF PERIRECTAL ABSCESS  11/10/2023    Procedure: INCISION, ABSCESS, PERIRECTAL;  Surgeon: Jamie Garcia MD;  Location: [facility] OR;  Service: Colon and Rectal;;    JOINT REPLACEMENT       No family history on file.  Social History     Tobacco Use    Smoking status: Former    Smokeless tobacco: Never    Tobacco comments:     states a pack would last a week, quit 50 yrs ago   Substance Use Topics    Alcohol use: Not Currently    Drug use: Never     Review of Systems   Constitutional:  Positive for fever.        Generalized weakness.   HENT:  Negative for sore throat.    Respiratory:  Negative for shortness of breath.    Cardiovascular:  Negative for chest pain.   Gastrointestinal:  Negative for nausea.   Genitourinary:  Negative for difficulty urinating, dyspareunia, dysuria, menstrual problem, vaginal bleeding and vaginal discharge.   Musculoskeletal:  Negative for back pain.   Skin:  Negative for rash.   Neurological:  Negative for weakness.   Hematological:  Does not bruise/bleed easily.   Psychiatric/Behavioral:  Positive for confusion.        Physical Exam     Initial Vitals [01/17/25 1515]   BP Pulse Resp Temp SpO2   (!)  136/53 74 20 98.3 °F (36.8 °C) 98 %      MAP       --         Physical Exam    Constitutional:   No signs of trauma to the head, neck, or back.   HENT:   Head: Normocephalic.   Eyes: EOM are normal. Right eye exhibits no discharge. Left eye exhibits no discharge. No scleral icterus.   Neck: Neck supple.   Cardiovascular:  Normal rate, regular rhythm and normal heart sounds.           Pulmonary/Chest: Breath sounds normal. No stridor. No respiratory distress. She has no wheezes. She has no rhonchi. She has no rales.   Abdominal: She exhibits no distension. There is no abdominal tenderness. There is no rebound and no guarding.   Musculoskeletal:         General: No tenderness or edema. Normal range of motion.      Cervical back: Neck supple.     Neurological: She is alert.   The pt is confused. She is disoriented to time and year.   Skin: Skin is dry. No rash noted. No erythema. No pallor.   The pt feels warm to the touch.   Psychiatric: She has a normal mood and affect. Her behavior is normal. Judgment and thought content normal.         ED Course   Procedures  Labs Reviewed   COMPREHENSIVE METABOLIC PANEL - Abnormal       Result Value    Sodium 140      Potassium 4.4      Chloride 112 (*)     CO2 18 (*)     Glucose 153 (*)     Blood Urea Nitrogen 26.5 (*)     Creatinine 1.83 (*)     Calcium 9.2      Protein Total 7.6      Albumin 3.8      Globulin 3.8 (*)     Albumin/Globulin Ratio 1.0 (*)     Bilirubin Total 0.6      ALP 75      ALT 8      AST 15      eGFR 27      Anion Gap 10.0      BUN/Creatinine Ratio 14     URINALYSIS, REFLEX TO URINE CULTURE - Abnormal    Color, UA Light-Yellow      Appearance, UA Clear      Specific Gravity, UA 1.013      pH, UA 5.5      Protein, UA Trace (*)     Glucose, UA Normal      Ketones, UA Negative      Blood, UA Negative      Bilirubin, UA Negative      Urobilinogen, UA Normal      Nitrites, UA Negative      Leukocyte Esterase, UA Negative      RBC, UA None Seen      WBC, UA 0-5       Bacteria, UA Many (*)     Squamous Epithelial Cells, UA Trace      Mucous, UA Trace (*)    CBC WITH DIFFERENTIAL - Abnormal    WBC 9.85      RBC 3.61 (*)     Hgb 11.1 (*)     Hct 34.2 (*)     MCV 94.7 (*)     MCH 30.7      MCHC 32.5 (*)     RDW 12.2      Platelet 139      MPV 12.6 (*)     IPF 8.9      Neut % 85.1      Lymph % 4.9      Mono % 9.4      Eos % 0.0      Basophil % 0.3      Imm Grans % 0.3      Neut # 8.38      Lymph # 0.48 (*)     Mono # 0.93      Eos # 0.00      Baso # 0.03      Imm Gran # 0.03      NRBC% 0.0     COVID/RSV/FLU A&B PCR - Abnormal    Influenza A PCR Detected (*)     Influenza B PCR Not Detected      Respiratory Syncytial Virus PCR Not Detected      SARS-CoV-2 PCR Not Detected      Narrative:     The Xpert Xpress SARS-CoV-2/FLU/RSV plus is a rapid, multiplexed real-time PCR test intended for the simultaneous qualitative detection and differentiation of SARS-CoV-2, Influenza A, Influenza B, and respiratory syncytial virus (RSV) viral RNA in either nasopharyngeal swab or nasal swab specimens.         POCT GLUCOSE - Abnormal    POCT Glucose 189 (*)    TSH - Normal    TSH 0.801     TROPONIN I - Normal    Troponin-I 0.025     BLOOD CULTURE [facility]   BLOOD CULTURE [facility]   CBC W/ AUTO DIFFERENTIAL    Narrative:     The following orders were created for panel order CBC auto differential.  Procedure                               Abnormality         Status                     ---------                               -----------         ------                     CBC with Differential[[id number]]       Abnormal            Final result                 Please view results for these tests on the individual orders.     EKG Readings: (Independently Interpreted)   Initial Reading: No STEMI. Rhythm: Normal Sinus Rhythm. Heart Rate: 95. Ectopy: No Ectopy. Conduction: Normal. ST Segments: Normal ST Segments. T Waves Flipped: II, III, AVF, I, AVL, V5, V6, V1 and V2. Axis: Normal.   T wave inversions are unchanged  from prior EKG.  Time: 1952.         Imaging Results              X-Ray Chest 1 View (Final result)  Result time 01/17/25 20:31:47      Final result by Terrance Acosta MD (01/17/25 20:31:47)                   Impression:      No acute cardiopulmonary process identified.      Electronically signed by: Terrance Acosta  Date:    01/17/2025  Time:    20:31               Narrative:    EXAMINATION:  XR CHEST 1 VIEW    CLINICAL HISTORY:  fever;    TECHNIQUE:  One view    COMPARISON:  October 6, 2022.    FINDINGS:  Cardiopericardial silhouette is within normal limits.  No acute dense focal or segmental consolidation, congestive process, pleural effusions or pneumothorax.                                       CT Cervical Spine Without Contrast (Final result)  Result time 01/17/25 18:30:10      Final result by Topher Nice MD (01/17/25 18:30:10)                   Impression:      No acute bony injury of the cervical spine.      Electronically signed by: Topher Nice  Date:    01/17/2025  Time:    18:30               Narrative:    EXAMINATION:  CT CERVICAL SPINE WITHOUT CONTRAST    CLINICAL HISTORY:  Polytrauma, blunt;    TECHNIQUE:  Helical acquisition through the cervical spine without IV contrast. Three plane reconstructions were made available for review.  mGycm. Automatic exposure control, adjustment of mA/kV or iterative reconstruction technique was used to reduce radiation.    COMPARISON:  None available.    FINDINGS:  No fractures identified. No subluxation. Craniocervical junction and C1-C2 relationship are normal. The odontoid is intact. There is limited evaluation of the soft tissues. No prevertebral soft tissue swelling. Ligamentous injury cannot be excluded with CT.  Moderate degenerative changes including prominent posterior osteophyte C4-C5.                                       CT Head Without Contrast (Final result)  Result time 01/17/25 18:27:46      Final result by Topher Nice MD (01/17/25  18:27:46)                   Impression:      No acute intracranial findings.      Electronically signed by: Topher Nice  Date:    01/17/2025  Time:    18:27               Narrative:    EXAMINATION:  CT HEAD WITHOUT CONTRAST    CLINICAL HISTORY:  Mental status change, unknown cause;    TECHNIQUE:  CT imaging of the head performed from the skull base to the vertex without intravenous contrast.  DLP 1043 mGycm. Automatic exposure control, adjustment of mA/kV or iterative reconstruction technique was used to reduce radiation.    COMPARISON:  25 November 2021    FINDINGS:  There is no acute cortical infarct, hemorrhage or mass lesion.  There is moderate patchy hypoattenuation in the cerebral white matter which is nonspecific but most commonly associated with chronic small vessel ischemic changes.  There is mild global atrophy.  There are dense vascular calcifications.    Visualized paranasal sinuses and mastoid air cells are clear. The calvarium is grossly intact                                       Medications   cefTRIAXone injection 1 g (has no administration in time range)   acetaminophen tablet 1,000 mg (1,000 mg Oral Given 1/17/25 2006)   oseltamivir capsule 75 mg (75 mg Oral Given 1/17/25 2007)     Medical Decision Making  Differential diagnosis include but are not limited to: flu, pneumonia, COVID, UTI, and head injury.    Amount and/or Complexity of Data Reviewed  Independent Historian:      Details: Per the pt's granddaughter who is bedside, the pt was last seen normal yesterday morning. The pt's granddaughter states that the pt had a fall sometime within the past 24 hours while alone at her house. She states that a family member called the pt this morning to check on her and noticed that she was more confused than usual, prompting them to bring her to the ED for evaluation. She states that the pt has had similar episodes of confusion in the past when she has had a UTI. Associated symptoms include generalized  weakness and fever of 102.8 in the ED. She denies any other complaints.    Labs: ordered. Decision-making details documented in ED Course.  Radiology: ordered and independent interpretation performed. Decision-making details documented in ED Course.    Risk  OTC drugs.  Prescription drug management.  Decision regarding hospitalization.            Scribe Attestation:   Scribe #1: I performed the above scribed service and the documentation accurately describes the services I performed. I attest to the accuracy of the note.    Attending Attestation:           Physician Attestation for Scribe:  Physician Attestation Statement for Scribe #1: I, Tu Green MD, reviewed documentation, as scribed by Zahra Paredes in my presence, and it is both accurate and complete.             ED Course as of 01/17/25 2044 Fri Jan 17, 2025 2034 Paged hospitalist. [RB]      ED Course User Index  [RB] Zahra Paredes                           Clinical Impression:  Final diagnoses:  [R41.82] AMS (altered mental status)  [N39.0] UTI (urinary tract infection)  [R41.82] Altered mental status, unspecified altered mental status type (Primary)  [R50.9] Fever, unspecified fever cause  [J10.1] Influenza A          ED Disposition Condition    Admit Stable                Tu Green MD  01/17/25 2044

## 2025-01-18 NOTE — H&P
[facility]  Hospital Medicine History & Physical Examination       Patient Name: Tonie Tejada  MRN: [id number]  Patient Class: IP- Inpatient   Admission Date: 1/17/2025   Admitting Physician: ABI Service   Length of Stay: 1  Attending Physician: Luis Sahu MD  Primary Care Provider: Coleman, Shonetelle B, NP  Face-to-Face encounter date: 01/18/2025  Code Status:  Full code  Chief Complaint: Altered Mental Status (Pt had unwitnessed fall last night. Family member called at 11 am and noticed pt was altered. Baseline GCS 15. . Last seen normal yesterday morning. GCS 13 in triage)                  HISTORY OF PRESENT ILLNESS:   Tonie Tejada is a 84 y.o. female who  has a past medical history of Chronic hepatitis B, DM (diabetes mellitus), HLD (hyperlipidemia), HTN (hypertension), OA (osteoarthritis), and TIA (transient ischemic attack)..     84-year-old woman who uses a walker at baseline for ambulation, past medical history of DM, HTN, HLD, history of TIA brought to ER by her family members after more lethargy, change in mental status, episode of fall and episodes of confusion.  Patient's family denies seizure activity, head injury.  Patient reportedly had similar symptoms in the past with UTI.    At presentation she was febrile 102.8, bradycardic, elevated blood pressure and was doing well on room air.  CT head, CT spine were unremarkable.  Chest x-ray showed no acute processes.  Lab work revealed chronic anemia, stable platelet count today she is mildly thrombocytopenic.  P.o. mild acidotic with elevated BUN and serum creatinine at admission.  She was tested positive for flu for which Tamiflu was added.  Troponin was within normal limits.  UA was unremarkable.  Blood cultures were sent.  Patient was admitted to hospital medicine service for further evaluation and management.      When seen at bedside she was alert, oriented, answers all questions appropriately but  appeared forgetful during the conversation.  Granddaughter was at bedside.  Patient denies any chest pain, shortness of breadth but reports cough.          PAST MEDICAL HISTORY:     Past Medical History:   Diagnosis Date    Chronic hepatitis B     DM (diabetes mellitus)     HLD (hyperlipidemia)     HTN (hypertension)     OA (osteoarthritis)     TIA (transient ischemic attack)        PAST SURGICAL HISTORY:     Past Surgical History:   Procedure Laterality Date     SECTION      COLONOSCOPY N/A 10/21/2023    Procedure: COLONOSCOPY;  Surgeon: Jasper Davis MD;  Location: [facility] OR;  Service: Gastroenterology;  Laterality: N/A;    COLONOSCOPY, WITH POLYPECTOMY USING SNARE  10/21/2023    Procedure: COLONOSCOPY, WITH POLYPECTOMY USING SNARE;  Surgeon: Jasper Davis MD;  Location: [facility] OR;  Service: Gastroenterology;;    DIGITAL RECTAL EXAMINATION UNDER ANESTHESIA N/A 2023    Procedure: EXAM UNDER ANESTHESIA, DIGITAL, RECTUM;  Surgeon: Jamie Garcia MD;  Location: [facility];  Service: General;  Laterality: N/A;    ESOPHAGOGASTRODUODENOSCOPY N/A 10/20/2023    Procedure: EGD;  Surgeon: Cody Urbina MD;  Location: [facility] ENDOSCOPY;  Service: Gastroenterology;  Laterality: N/A;    EXAMINATION UNDER ANESTHESIA N/A 11/10/2023    Procedure: Exam under anesthesia;  Surgeon: Jamie Garcia MD;  Location: [facility];  Service: Colon and Rectal;  Laterality: N/A;  Prone jackknife    EXCISIONAL HEMORRHOIDECTOMY  11/10/2023    Procedure: HEMORRHOIDECTOMY;  Surgeon: Jamie Garcia MD;  Location: [facility];  Service: Colon and Rectal;;    HYSTERECTOMY      INCISION OF PERIRECTAL ABSCESS  11/10/2023    Procedure: INCISION, ABSCESS, PERIRECTAL;  Surgeon: Jamie Garcia MD;  Location: [facility] OR;  Service: Colon and Rectal;;    JOINT REPLACEMENT         ALLERGIES:   Patient has no known allergies.    FAMILY HISTORY:   Reviewed and negative    SOCIAL HISTORY:     Social History     Tobacco Use     Smoking status: Former    Smokeless tobacco: Never    Tobacco comments:     states a pack would last a week, quit 50 yrs ago   Substance Use Topics    Alcohol use: Not Currently        HOME MEDICATIONS:     Prior to Admission medications    Medication Sig Start Date End Date Taking? Authorizing Provider   albuterol (PROVENTIL/VENTOLIN HFA) 90 mcg/actuation inhaler 1 puff every 4 (four) hours as needed. 1/1/24   Provider, Historical   amLODIPine (NORVASC) 5 MG tablet Take 5 mg by mouth once daily.    Provider, Historical   entecavir (BARACLUDE) 0.5 MG Tab Take 1/2 (one-half) tablet by mouth once daily 1/8/25   Rhianna Pierce APRN   hydrocortisone (ANUSOL-HC) 2.5 % rectal cream Place rectally 2 (two) times daily. for 7 days 12/1/23 7/16/24  Arturo Simon FNP   metoprolol tartrate (LOPRESSOR) 25 MG tablet Take 25 mg by mouth 2 (two) times daily.    Provider, Historical   omeprazole (PRILOSEC) 40 MG capsule Take 1 capsule (40 mg total) by mouth every morning. 7/16/24   Rhianna Pierce APRN   pioglitazone (ACTOS) 30 MG tablet Take 30 mg by mouth once daily.    Provider, Historical   simvastatin (ZOCOR) 20 MG tablet Take 20 mg by mouth every evening.    Provider, Historical       REVIEW OF SYSTEMS:   Except as documented, all other systems reviewed and negative     PHYSICAL EXAM:     VITAL SIGNS: 24 HRS MIN & MAX LAST   Temp  Min: 98.2 °F (36.8 °C)  Max: 102.8 °F (39.3 °C) 98.7 °F (37.1 °C)   BP  Min: 125/54  Max: 169/64 (!) 150/78   Pulse  Min: 74  Max: 95  88   Resp  Min: 19  Max: 36 19   SpO2  Min: 93 %  Max: 98 % 95 %     General appearance: Well-developed, well-nourished female in no apparent distress.  HENT: Atraumatic head. Moist mucous membranes of oral cavity.  Eyes: Normal extraocular movements.   Neck: Supple.   Lungs: Clear to auscultation bilaterally. No wheezing present.   Heart: Regular rate and rhythm. S1 and S2 present with no murmurs/gallop/rub. No pedal edema. No JVD present.    Abdomen: Soft, non-distended, non-tender. No rebound tenderness/guarding. Bowel sounds are normal.   Extremities: No cyanosis, clubbing, or edema.  Skin: No Rash.   Neuro: Motor and sensory exams grossly intact. Good tone. Muscle strength 5/5 in all 4 extremities  Psych/mental status: Appropriate mood and affect. Responds appropriately to questions.     LABS AND IMAGING:     Recent Labs   Lab 01/17/25  1544 01/18/25  0615   WBC 9.85 6.62   RBC 3.61* 3.71*   HGB 11.1* 11.5*   HCT 34.2* 35.6*   MCV 94.7* 96.0*   MCH 30.7 31.0   MCHC 32.5* 32.3*   RDW 12.2 12.3    121*   MPV 12.6* 12.6*       Recent Labs   Lab 01/17/25  1544 01/18/25  0615    142   K 4.4 4.4   * 111*   CO2 18* 20*   BUN 26.5* 28.9*   CREATININE 1.83* 1.80*   CALCIUM 9.2 8.9   MG  --  2.30   ALBUMIN 3.8 3.9   ALKPHOS 75 77   ALT 8 8   AST 15 23   BILITOT 0.6 0.7       Microbiology Results (last 7 days)       Procedure Component Value Units Date/Time    Blood culture #1 **CANNOT BE ORDERED STAT** [[id number]] Collected: 01/17/25 2116    Order Status: Resulted Specimen: Blood Updated: 01/17/25 2116    Blood culture #2 **CANNOT BE ORDERED STAT** [[id number]] Collected: 01/17/25 2116    Order Status: Resulted Specimen: Blood Updated: 01/17/25 2116             X-Ray Chest 1 View  Narrative: EXAMINATION:  XR CHEST 1 VIEW    CLINICAL HISTORY:  fever;    TECHNIQUE:  One view    COMPARISON:  October 6, 2022.    FINDINGS:  Cardiopericardial silhouette is within normal limits.  No acute dense focal or segmental consolidation, congestive process, pleural effusions or pneumothorax.  Impression: No acute cardiopulmonary process identified.    Electronically signed by: Terrance Acosta  Date:    01/17/2025  Time:    20:31  CT Cervical Spine Without Contrast  Narrative: EXAMINATION:  CT CERVICAL SPINE WITHOUT CONTRAST    CLINICAL HISTORY:  Polytrauma, blunt;    TECHNIQUE:  Helical acquisition through the cervical spine without IV contrast. Three plane  reconstructions were made available for review.  mGycm. Automatic exposure control, adjustment of mA/kV or iterative reconstruction technique was used to reduce radiation.    COMPARISON:  None available.    FINDINGS:  No fractures identified. No subluxation. Craniocervical junction and C1-C2 relationship are normal. The odontoid is intact. There is limited evaluation of the soft tissues. No prevertebral soft tissue swelling. Ligamentous injury cannot be excluded with CT.  Moderate degenerative changes including prominent posterior osteophyte C4-C5.  Impression: No acute bony injury of the cervical spine.    Electronically signed by: Topher Nice  Date:    01/17/2025  Time:    18:30  CT Head Without Contrast  Narrative: EXAMINATION:  CT HEAD WITHOUT CONTRAST    CLINICAL HISTORY:  Mental status change, unknown cause;    TECHNIQUE:  CT imaging of the head performed from the skull base to the vertex without intravenous contrast.  DLP 1043 mGycm. Automatic exposure control, adjustment of mA/kV or iterative reconstruction technique was used to reduce radiation.    COMPARISON:  25 November 2021    FINDINGS:  There is no acute cortical infarct, hemorrhage or mass lesion.  There is moderate patchy hypoattenuation in the cerebral white matter which is nonspecific but most commonly associated with chronic small vessel ischemic changes.  There is mild global atrophy.  There are dense vascular calcifications.    Visualized paranasal sinuses and mastoid air cells are clear. The calvarium is grossly intact  Impression: No acute intracranial findings.    Electronically signed by: Topher Nice  Date:    01/17/2025  Time:    18:27      ASSESSMENT & PLAN:     Acute metabolic encephalopathy-resolved   Influenza A positive at admission  Generalized weakness, episode of fall    HX: Obesity, HTN, HLD, history of TIA, T2 dm, chronic hep B, osteoarthritis    Plan:   -continue supportive care for influenza A..  Continue Tamiflu.   Antitussives, decongestants.  Encourage p.o. intake   -mental status seems to be back at baseline.  Continue to monitor  -low suspicion for infectious process.  Follow up blood cultures until finalized.  Not on antibiotics  -PT OT  -will review home medications     Lovenox  __________________________________________________________________________  INPATIENT LIST OF MEDICATIONS     Current Facility-Administered Medications:     0.9% NaCl infusion, , Intravenous, Continuous, Juan Stricklandyssa, FNP, Last Rate: 75 mL/hr at 01/18/25 0411, New Bag at 01/18/25 0411    acetaminophen tablet 1,000 mg, 1,000 mg, Oral, Q6H PRN, Em, Marine, FNP    aluminum-magnesium hydroxide-simethicone 200-200-20 mg/5 mL suspension 30 mL, 30 mL, Oral, QID PRN, Em, Marine, FNP    bisacodyL suppository 10 mg, 10 mg, Rectal, Daily PRN, Em Marine, FNP    dextrose 50% injection 12.5 g, 12.5 g, Intravenous, PRN, Em, Marine, FNP    dextrose 50% injection 25 g, 25 g, Intravenous, PRN, Em, Marine, FNP    glucagon (human recombinant) injection 1 mg, 1 mg, Intramuscular, PRN, Em, Marine, FNP    glucose chewable tablet 16 g, 16 g, Oral, PRN, [place], Marine, FNP    glucose chewable tablet 24 g, 24 g, Oral, PRN, Em Marine, FNP    melatonin tablet 6 mg, 6 mg, Oral, Nightly PRN, Em Marine, FNP    ondansetron injection 4 mg, 4 mg, Intravenous, Q4H PRN, Em Marine, FNP    oseltamivir capsule 30 mg, 30 mg, Oral, Daily, Em Marine, FNP    polyethylene glycol packet 17 g, 17 g, Oral, BID PRN, Marine Strickland, KUSHAL    sodium chloride 0.9% flush 10 mL, 10 mL, Intravenous, PRN, Marine Strickland FNP      Scheduled Meds:   oseltamivir  30 mg Oral Daily     Continuous Infusions:   0.9% NaCl   Intravenous Continuous 75 mL/hr at 01/18/25 0411 New Bag at 01/18/25 0411     PRN Meds:.  Current Facility-Administered Medications:     acetaminophen, 1,000 mg, Oral, Q6H PRN    aluminum-magnesium hydroxide-simethicone, 30 mL,  Oral, QID PRN    bisacodyL, 10 mg, Rectal, Daily PRN    dextrose 50%, 12.5 g, Intravenous, PRN    dextrose 50%, 25 g, Intravenous, PRN    glucagon (human recombinant), 1 mg, Intramuscular, PRN    glucose, 16 g, Oral, PRN    glucose, 24 g, Oral, PRN    melatonin, 6 mg, Oral, Nightly PRN    ondansetron, 4 mg, Intravenous, Q4H PRN    polyethylene glycol, 17 g, Oral, BID PRN    sodium chloride 0.9%, 10 mL, Intravenous, PRN      Luis Sahu MD   01/18/2025     Screening for Social Drivers for health:  Patient screened for food insecurity, housing instability, transportation needs, utility difficulties, and interpersonal safety (select all that apply as identified as concern)  []Housing or Food  []Transportation Needs  []Utility Difficulties  []Interpersonal safety  [x]None

## 2025-01-18 NOTE — PLAN OF CARE
Problem: Adult Inpatient Plan of Care  Goal: Plan of Care Review  Outcome: Progressing  Goal: Patient-Specific Goal (Individualized)  Outcome: Progressing  Goal: Absence of Hospital-Acquired Illness or Injury  Outcome: Progressing  Goal: Optimal Comfort and Wellbeing  Outcome: Progressing  Goal: Readiness for Transition of Care  Outcome: Progressing     Problem: Skin Injury Risk Increased  Goal: Skin Health and Integrity  Outcome: Progressing     Problem: Diabetes Comorbidity  Goal: Blood Glucose Level Within Targeted Range  Outcome: Progressing     Problem: Infection  Goal: Absence of Infection Signs and Symptoms  Outcome: Progressing     Problem: Fall Injury Risk  Goal: Absence of Fall and Fall-Related Injury  Outcome: Progressing     Problem: Pain Acute  Goal: Optimal Pain Control and Function  Outcome: Progressing     Problem: Fatigue  Goal: Improved Activity Tolerance  Outcome: Progressing

## 2025-01-18 NOTE — PROGRESS NOTES
Pharmacist Renal Dose Adjustment Note    Tonie Tejada is a 84 y.o. female being treated with the medication oseltamivir    Patient Data:    Vital Signs (Most Recent):  Temp: 98.6 °F (37 °C) (01/18/25 0343)  Pulse: 82 (01/18/25 0343)  Resp: 19 (01/18/25 0327)  BP: (!) 162/82 (01/18/25 0343)  SpO2: 96 % (01/18/25 0343) Vital Signs (72h Range):  Temp:  [98.2 °F (36.8 °C)-102.8 °F (39.3 °C)]   Pulse:  [74-95]   Resp:  [19-36]   BP: (125-169)/(53-82)   SpO2:  [93 %-98 %]      Recent Labs   Lab 01/17/25  1544   CREATININE 1.83*     CrCl estimated 21.6 ml/min     Wt: 80.4 kg  IBW: 47.8 kg  Adj BW: 60.8 kg     Medication: oseltamivir 30mg BID changed to daily in accordance with renal function and [facility]al Dosing Guidelines.       Pharmacist's Name: Linette Joyce  Pharmacist's Extension: [phone]

## 2025-01-19 LAB
OHS QRS DURATION: 112 MS
OHS QTC CALCULATION: 459 MS
POCT GLUCOSE: 165 MG/DL (ref 70–110)
POCT GLUCOSE: 166 MG/DL (ref 70–110)
POCT GLUCOSE: 194 MG/DL (ref 70–110)
POCT GLUCOSE: 76 MG/DL (ref 70–110)

## 2025-01-19 PROCEDURE — 99900031 HC PATIENT EDUCATION (STAT)

## 2025-01-19 PROCEDURE — 94760 N-INVAS EAR/PLS OXIMETRY 1: CPT

## 2025-01-19 PROCEDURE — 99900035 HC TECH TIME PER 15 MIN (STAT)

## 2025-01-19 PROCEDURE — 94761 N-INVAS EAR/PLS OXIMETRY MLT: CPT

## 2025-01-19 PROCEDURE — 25000003 PHARM REV CODE 250: Performed by: INTERNAL MEDICINE

## 2025-01-19 PROCEDURE — 63600175 PHARM REV CODE 636 W HCPCS: Performed by: INTERNAL MEDICINE

## 2025-01-19 PROCEDURE — 21400001 HC TELEMETRY ROOM

## 2025-01-19 PROCEDURE — 94640 AIRWAY INHALATION TREATMENT: CPT

## 2025-01-19 PROCEDURE — 25000003 PHARM REV CODE 250

## 2025-01-19 PROCEDURE — 25000242 PHARM REV CODE 250 ALT 637 W/ HCPCS: Performed by: INTERNAL MEDICINE

## 2025-01-19 RX ORDER — IPRATROPIUM BROMIDE AND ALBUTEROL SULFATE 2.5; .5 MG/3ML; MG/3ML
3 SOLUTION RESPIRATORY (INHALATION)
Status: COMPLETED | OUTPATIENT
Start: 2025-01-19 | End: 2025-01-21

## 2025-01-19 RX ADMIN — IPRATROPIUM BROMIDE AND ALBUTEROL SULFATE 3 ML: .5; 3 SOLUTION RESPIRATORY (INHALATION) at 05:01

## 2025-01-19 RX ADMIN — IPRATROPIUM BROMIDE AND ALBUTEROL SULFATE 3 ML: .5; 3 SOLUTION RESPIRATORY (INHALATION) at 09:01

## 2025-01-19 RX ADMIN — PANTOPRAZOLE SODIUM 40 MG: 40 TABLET, DELAYED RELEASE ORAL at 09:01

## 2025-01-19 RX ADMIN — INSULIN ASPART 1 UNITS: 100 INJECTION, SOLUTION INTRAVENOUS; SUBCUTANEOUS at 08:01

## 2025-01-19 RX ADMIN — OSELTAMIVIR PHOSPHATE 30 MG: 30 CAPSULE ORAL at 09:01

## 2025-01-19 RX ADMIN — GUAIFENESIN 600 MG: 600 TABLET, EXTENDED RELEASE ORAL at 08:01

## 2025-01-19 RX ADMIN — AMLODIPINE BESYLATE 5 MG: 5 TABLET ORAL at 09:01

## 2025-01-19 RX ADMIN — GUAIFENESIN 600 MG: 600 TABLET, EXTENDED RELEASE ORAL at 09:01

## 2025-01-19 RX ADMIN — METOPROLOL TARTRATE 25 MG: 25 TABLET, FILM COATED ORAL at 08:01

## 2025-01-19 RX ADMIN — METOPROLOL TARTRATE 25 MG: 25 TABLET, FILM COATED ORAL at 09:01

## 2025-01-19 RX ADMIN — ATORVASTATIN CALCIUM 40 MG: 40 TABLET, FILM COATED ORAL at 08:01

## 2025-01-19 RX ADMIN — BENZONATATE 100 MG: 100 CAPSULE ORAL at 08:01

## 2025-01-19 RX ADMIN — BENZONATATE 100 MG: 100 CAPSULE ORAL at 09:01

## 2025-01-19 RX ADMIN — ENOXAPARIN SODIUM 30 MG: 30 INJECTION SUBCUTANEOUS at 04:01

## 2025-01-19 NOTE — PLAN OF CARE
Spoke to patient's daughter Ketty. She is interested in swingbed placement for her mama at dc due to multiple falls at home prior to admission and weakeness from UTI and flu. Will send referral today  due to pending weather and holiday. No therapy eval noted

## 2025-01-19 NOTE — PROGRESS NOTES
[facility] Medicine Progress Note        Chief Complaint: Inpatient Follow-up     HPI:   84-year-old woman who uses a walker at baseline for ambulation, past medical history of DM, HTN, HLD, history of TIA brought to ER by her family members after more lethargy, change in mental status, episode of fall and episodes of confusion.  Patient's family denies seizure activity, head injury.  Patient reportedly had similar symptoms in the past with UTI.     At presentation she was febrile 102.8, bradycardic, elevated blood pressure and was doing well on room air.  CT head, CT spine were unremarkable.  Chest x-ray showed no acute processes.  Lab work revealed chronic anemia, stable platelet count today she is mildly thrombocytopenic.  P.o. mild acidotic with elevated BUN and serum creatinine at admission.  She was tested positive for flu for which Tamiflu was added.  Troponin was within normal limits.  UA was unremarkable.  Blood cultures were sent.  Patient was admitted to hospital medicine service for further evaluation and management.          Interval Hx:   No acute events reported overnight  Seen at bedside this ELKE myles present. Pt reports she feels improving, reports wheezing mild no other complaints, denied smoking hx, copd hx    Discussed pan of care    Case was discussed with patient's nurse     HDS on rA       Objective/physical exam:  General: In no acute distress, afebrile  Chest: mild exp wheezing, Clear to auscultation bilaterally  Heart:  +S1, S2, no appreciable murmur  Abdomen: Soft, nontender  MSK: Warm  Neurologic: Alert and answering appropriately, conversant   VITAL SIGNS: 24 HRS MIN & MAX LAST   Temp  Min: 98.4 °F (36.9 °C)  Max: 99.5 °F (37.5 °C) 98.6 °F (37 °C)   BP  Min: 137/62  Max: 151/70 (!) 144/73   Pulse  Min: 74  Max: 91  74   Resp  Min: 16  Max: 19 18   SpO2  Min: 96 %  Max: 98 % 98 %     I have reviewed the following labs:  Recent Labs   Lab  01/17/25  1544 01/18/25  0615   WBC 9.85 6.62   RBC 3.61* 3.71*   HGB 11.1* 11.5*   HCT 34.2* 35.6*   MCV 94.7* 96.0*   MCH 30.7 31.0   MCHC 32.5* 32.3*   RDW 12.2 12.3    121*   MPV 12.6* 12.6*     Recent Labs   Lab 01/17/25  1544 01/18/25  0615    142   K 4.4 4.4   * 111*   CO2 18* 20*   BUN 26.5* 28.9*   CREATININE 1.83* 1.80*   CALCIUM 9.2 8.9   MG  --  2.30   ALBUMIN 3.8 3.9   ALKPHOS 75 77   ALT 8 8   AST 15 23   BILITOT 0.6 0.7     Microbiology Results (last 7 days)       Procedure Component Value Units Date/Time    Blood culture #1 **CANNOT BE ORDERED STAT** [[id number]]  (Normal) Collected: 01/17/25 2116    Order Status: Completed Specimen: Blood Updated: 01/18/25 2301     Blood Culture No Growth At 24 Hours    Blood culture #2 **CANNOT BE ORDERED STAT** [[id number]]  (Normal) Collected: 01/17/25 2116    Order Status: Completed Specimen: Blood Updated: 01/18/25 2301     Blood Culture No Growth At 24 Hours             See below for Radiology    Assessment/Plan:  Acute metabolic encephalopathy on arrival   Influenza A positive at admission  Generalized weakness, episode of fall  ? CKD   HX: Obesity, HTN, HLD, history of TIA, T2 dm, chronic hep B, osteoarthritis     Plan:   Continue Tamiflu.  Add nebs, cont Antitussives, decongestants.  Encourage p.o. intake   mental status seems to be back at baseline.  Continue to monitor  Blood cx neg, cont to monitor , off of antibiotics  Mobilize/ PT OT  Case mngt on board     VTE prophylaxis: Lovenox     Anticipated discharge and Disposition:   tbd          Portions of this note dictated using EMR integrated voice recognition software, and may be subject to voice recognition errors not corrected at proofreading. Please contact writer for clarification if needed.   _____________________________________________________________________    Malnutrition Status:  Nutrition consulted. Most recent weight and BMI monitored-     Measurements:  Wt Readings from  Last 1 Encounters:   01/18/25 84 kg (185 lb 3 oz)   Body mass index is 34.99 kg/m².    Patient has been screened and assessed by RD.    Malnutrition Type:  Context:    Level:      Malnutrition Characteristic Summary:       Interventions/Recommendations (treatment strategy):        Scheduled Med:   amLODIPine  5 mg Oral Daily    atorvastatin  40 mg Oral QHS    enoxparin  30 mg Subcutaneous Q24H (prophylaxis, 1700)    guaiFENesin  600 mg Oral BID    metoprolol tartrate  25 mg Oral BID    oseltamivir  30 mg Oral Daily    pantoprazole  40 mg Oral Daily      Continuous Infusions:     PRN Meds:    Current Facility-Administered Medications:     acetaminophen, 1,000 mg, Oral, Q6H PRN    albuterol, 1 puff, Inhalation, Q4H PRN    aluminum-magnesium hydroxide-simethicone, 30 mL, Oral, QID PRN    benzonatate, 100 mg, Oral, TID PRN    bisacodyL, 10 mg, Rectal, Daily PRN    dextrose 50%, 12.5 g, Intravenous, PRN    dextrose 50%, 12.5 g, Intravenous, PRN    dextrose 50%, 25 g, Intravenous, PRN    dextrose 50%, 25 g, Intravenous, PRN    glucagon (human recombinant), 1 mg, Intramuscular, PRN    glucagon (human recombinant), 1 mg, Intramuscular, PRN    glucose, 16 g, Oral, PRN    glucose, 16 g, Oral, PRN    glucose, 24 g, Oral, PRN    glucose, 24 g, Oral, PRN    insulin aspart U-100, 0-10 Units, Subcutaneous, QID (AC + HS) PRN    melatonin, 6 mg, Oral, Nightly PRN    ondansetron, 4 mg, Intravenous, Q4H PRN    polyethylene glycol, 17 g, Oral, BID PRN    sodium chloride 0.9%, 10 mL, Intravenous, PRN     Radiology:  I have personally reviewed the following imaging and agree with the radiologist.     X-Ray Chest 1 View  Narrative: EXAMINATION:  XR CHEST 1 VIEW    CLINICAL HISTORY:  fever;    TECHNIQUE:  One view    COMPARISON:  October 6, 2022.    FINDINGS:  Cardiopericardial silhouette is within normal limits.  No acute dense focal or segmental consolidation, congestive process, pleural effusions or pneumothorax.  Impression: No acute  cardiopulmonary process identified.    Electronically signed by: Terrance Acosta  Date:    01/17/2025  Time:    20:31  CT Cervical Spine Without Contrast  Narrative: EXAMINATION:  CT CERVICAL SPINE WITHOUT CONTRAST    CLINICAL HISTORY:  Polytrauma, blunt;    TECHNIQUE:  Helical acquisition through the cervical spine without IV contrast. Three plane reconstructions were made available for review.  mGycm. Automatic exposure control, adjustment of mA/kV or iterative reconstruction technique was used to reduce radiation.    COMPARISON:  None available.    FINDINGS:  No fractures identified. No subluxation. Craniocervical junction and C1-C2 relationship are normal. The odontoid is intact. There is limited evaluation of the soft tissues. No prevertebral soft tissue swelling. Ligamentous injury cannot be excluded with CT.  Moderate degenerative changes including prominent posterior osteophyte C4-C5.  Impression: No acute bony injury of the cervical spine.    Electronically signed by: Topher Nice  Date:    01/17/2025  Time:    18:30  CT Head Without Contrast  Narrative: EXAMINATION:  CT HEAD WITHOUT CONTRAST    CLINICAL HISTORY:  Mental status change, unknown cause;    TECHNIQUE:  CT imaging of the head performed from the skull base to the vertex without intravenous contrast.  DLP 1043 mGycm. Automatic exposure control, adjustment of mA/kV or iterative reconstruction technique was used to reduce radiation.    COMPARISON:  25 November 2021    FINDINGS:  There is no acute cortical infarct, hemorrhage or mass lesion.  There is moderate patchy hypoattenuation in the cerebral white matter which is nonspecific but most commonly associated with chronic small vessel ischemic changes.  There is mild global atrophy.  There are dense vascular calcifications.    Visualized paranasal sinuses and mastoid air cells are clear. The calvarium is grossly intact  Impression: No acute intracranial findings.    Electronically signed  by: Topher Nice  Date:    01/17/2025  Time:    18:27      Jess Tate MD  Department of [facility] Medicine   [facility]   01/19/2025

## 2025-01-19 NOTE — PLAN OF CARE
01/19/25 1128   Discharge Assessment   Discharge Plan A Skilled Nursing Facility   Discharge Plan B Home;Home with family   DME Needed Upon Discharge  none   Discharge Plan discussed with: Adult children   Transition of Care Barriers None

## 2025-01-20 LAB
ANION GAP SERPL CALC-SCNC: 8 MEQ/L
BASOPHILS # BLD AUTO: 0.02 X10(3)/MCL
BASOPHILS NFR BLD AUTO: 0.6 %
BUN SERPL-MCNC: 21.6 MG/DL (ref 9.8–20.1)
CALCIUM SERPL-MCNC: 8.1 MG/DL (ref 8.4–10.2)
CHLORIDE SERPL-SCNC: 107 MMOL/L (ref 98–107)
CO2 SERPL-SCNC: 19 MMOL/L (ref 23–31)
CREAT SERPL-MCNC: 1.45 MG/DL (ref 0.55–1.02)
CREAT/UREA NIT SERPL: 15
EOSINOPHIL # BLD AUTO: 0.05 X10(3)/MCL (ref 0–0.9)
EOSINOPHIL NFR BLD AUTO: 1.5 %
ERYTHROCYTE [DISTWIDTH] IN BLOOD BY AUTOMATED COUNT: 12.2 % (ref 11.5–17)
GFR SERPLBLD CREATININE-BSD FMLA CKD-EPI: 35 ML/MIN/1.73/M2
GLUCOSE SERPL-MCNC: 95 MG/DL (ref 82–115)
HCT VFR BLD AUTO: 32.1 % (ref 37–47)
HGB BLD-MCNC: 10.5 G/DL (ref 12–16)
IMM GRANULOCYTES # BLD AUTO: 0.01 X10(3)/MCL (ref 0–0.04)
IMM GRANULOCYTES NFR BLD AUTO: 0.3 %
LYMPHOCYTES # BLD AUTO: 1.04 X10(3)/MCL (ref 0.6–4.6)
LYMPHOCYTES NFR BLD AUTO: 30.9 %
MCH RBC QN AUTO: 31.2 PG (ref 27–31)
MCHC RBC AUTO-ENTMCNC: 32.7 G/DL (ref 33–36)
MCV RBC AUTO: 95.3 FL (ref 80–94)
MONOCYTES # BLD AUTO: 0.51 X10(3)/MCL (ref 0.1–1.3)
MONOCYTES NFR BLD AUTO: 15.1 %
NEUTROPHILS # BLD AUTO: 1.74 X10(3)/MCL (ref 2.1–9.2)
NEUTROPHILS NFR BLD AUTO: 51.6 %
NRBC BLD AUTO-RTO: 0 %
PLATELET # BLD AUTO: 103 X10(3)/MCL (ref 130–400)
PLATELETS.RETICULATED NFR BLD AUTO: 11.3 % (ref 0.9–11.2)
PMV BLD AUTO: 13.3 FL (ref 7.4–10.4)
POCT GLUCOSE: 147 MG/DL (ref 70–110)
POCT GLUCOSE: 150 MG/DL (ref 70–110)
POCT GLUCOSE: 219 MG/DL (ref 70–110)
POCT GLUCOSE: 89 MG/DL (ref 70–110)
POTASSIUM SERPL-SCNC: 4.7 MMOL/L (ref 3.5–5.1)
RBC # BLD AUTO: 3.37 X10(6)/MCL (ref 4.2–5.4)
SODIUM SERPL-SCNC: 134 MMOL/L (ref 136–145)
WBC # BLD AUTO: 3.37 X10(3)/MCL (ref 4.5–11.5)

## 2025-01-20 PROCEDURE — 63600175 PHARM REV CODE 636 W HCPCS: Performed by: INTERNAL MEDICINE

## 2025-01-20 PROCEDURE — 21400001 HC TELEMETRY ROOM

## 2025-01-20 PROCEDURE — 94760 N-INVAS EAR/PLS OXIMETRY 1: CPT

## 2025-01-20 PROCEDURE — 82607 VITAMIN B-12: CPT | Performed by: INTERNAL MEDICINE

## 2025-01-20 PROCEDURE — 36415 COLL VENOUS BLD VENIPUNCTURE: CPT | Performed by: INTERNAL MEDICINE

## 2025-01-20 PROCEDURE — 99900031 HC PATIENT EDUCATION (STAT)

## 2025-01-20 PROCEDURE — 25000003 PHARM REV CODE 250: Performed by: INTERNAL MEDICINE

## 2025-01-20 PROCEDURE — 85025 COMPLETE CBC W/AUTO DIFF WBC: CPT | Performed by: INTERNAL MEDICINE

## 2025-01-20 PROCEDURE — 97166 OT EVAL MOD COMPLEX 45 MIN: CPT

## 2025-01-20 PROCEDURE — 99900035 HC TECH TIME PER 15 MIN (STAT)

## 2025-01-20 PROCEDURE — 80048 BASIC METABOLIC PNL TOTAL CA: CPT | Performed by: INTERNAL MEDICINE

## 2025-01-20 PROCEDURE — 94761 N-INVAS EAR/PLS OXIMETRY MLT: CPT

## 2025-01-20 PROCEDURE — 94640 AIRWAY INHALATION TREATMENT: CPT

## 2025-01-20 PROCEDURE — 97162 PT EVAL MOD COMPLEX 30 MIN: CPT

## 2025-01-20 PROCEDURE — 25000003 PHARM REV CODE 250

## 2025-01-20 PROCEDURE — 25000242 PHARM REV CODE 250 ALT 637 W/ HCPCS: Performed by: INTERNAL MEDICINE

## 2025-01-20 RX ADMIN — INSULIN ASPART 2 UNITS: 100 INJECTION, SOLUTION INTRAVENOUS; SUBCUTANEOUS at 09:01

## 2025-01-20 RX ADMIN — GUAIFENESIN 600 MG: 600 TABLET, EXTENDED RELEASE ORAL at 09:01

## 2025-01-20 RX ADMIN — BENZONATATE 100 MG: 100 CAPSULE ORAL at 09:01

## 2025-01-20 RX ADMIN — AMLODIPINE BESYLATE 5 MG: 5 TABLET ORAL at 09:01

## 2025-01-20 RX ADMIN — ATORVASTATIN CALCIUM 40 MG: 40 TABLET, FILM COATED ORAL at 09:01

## 2025-01-20 RX ADMIN — IPRATROPIUM BROMIDE AND ALBUTEROL SULFATE 3 ML: .5; 3 SOLUTION RESPIRATORY (INHALATION) at 08:01

## 2025-01-20 RX ADMIN — METOPROLOL TARTRATE 25 MG: 25 TABLET, FILM COATED ORAL at 09:01

## 2025-01-20 RX ADMIN — PANTOPRAZOLE SODIUM 40 MG: 40 TABLET, DELAYED RELEASE ORAL at 09:01

## 2025-01-20 RX ADMIN — OSELTAMIVIR PHOSPHATE 30 MG: 30 CAPSULE ORAL at 09:01

## 2025-01-20 RX ADMIN — IPRATROPIUM BROMIDE AND ALBUTEROL SULFATE 3 ML: .5; 3 SOLUTION RESPIRATORY (INHALATION) at 02:01

## 2025-01-20 RX ADMIN — ENOXAPARIN SODIUM 30 MG: 30 INJECTION SUBCUTANEOUS at 05:01

## 2025-01-20 RX ADMIN — IPRATROPIUM BROMIDE AND ALBUTEROL SULFATE 3 ML: .5; 3 SOLUTION RESPIRATORY (INHALATION) at 07:01

## 2025-01-20 NOTE — PROGRESS NOTES
[facility] Hospital Medicine Progress Note        Chief Complaint: Inpatient Follow-up     HPI:   84-year-old woman who uses a walker at baseline for ambulation, past medical history of DM, HTN, HLD, history of TIA brought to ER by her family members after more lethargy, change in mental status, episode of fall and episodes of confusion.  Patient's family denies seizure activity, head injury.  Patient reportedly had similar symptoms in the past with UTI.     At presentation she was febrile 102.8, bradycardic, elevated blood pressure and was doing well on room air.  CT head, CT spine were unremarkable.  Chest x-ray showed no acute processes.  Lab work revealed chronic anemia, stable platelet count today she is mildly thrombocytopenic.  P.o. mild acidotic with elevated BUN and serum creatinine at admission.  She was tested positive for flu for which Tamiflu was added.  Troponin was within normal limits.  UA was unremarkable.  Blood cultures were sent.  Patient was admitted to hospital medicine service for further evaluation and management.        Patient continued on Tamiflu, supportive care with neb treatments, antitussives.  Interval Hx:   No acute events reported overnight seen at bedside this morning, feels weak but improving, denied SOB, encourage mobility, p.o. intake    Case was discussed with patient's nurse     HDS on rA     Labs this morning showed hemoglobin 10.5, platelets 103 K, BUN 21.6, creatinine 1.45, bicarb 19, sodium 134  Objective/physical exam:  General: In no acute distress, afebrile  Chest:  Unlabored breathing on room  Heart:  Normal  Abdomen: Soft, nontender  MSK: Warm  Neurologic: Alert and answering appropriately  VITAL SIGNS: 24 HRS MIN & MAX LAST   Temp  Min: 98.2 °F (36.8 °C)  Max: 99.4 °F (37.4 °C) 98.2 °F (36.8 °C)   BP  Min: 115/69  Max: 164/65 123/79   Pulse  Min: 65  Max: 84  84   Resp  Min: 16  Max: 20 18   SpO2  Min: 96 %  Max: 99 % 98 %     I have  reviewed the following labs:  Recent Labs   Lab 01/17/25  1544 01/18/25  0615 01/20/25  0659   WBC 9.85 6.62 3.37*   RBC 3.61* 3.71* 3.37*   HGB 11.1* 11.5* 10.5*   HCT 34.2* 35.6* 32.1*   MCV 94.7* 96.0* 95.3*   MCH 30.7 31.0 31.2*   MCHC 32.5* 32.3* 32.7*   RDW 12.2 12.3 12.2    121* 103*   MPV 12.6* 12.6* 13.3*     Recent Labs   Lab 01/17/25  1544 01/18/25  0615 01/20/25  0540    142 134*   K 4.4 4.4 4.7   * 111* 107   CO2 18* 20* 19*   BUN 26.5* 28.9* 21.6*   CREATININE 1.83* 1.80* 1.45*   CALCIUM 9.2 8.9 8.1*   MG  --  2.30  --    ALBUMIN 3.8 3.9  --    ALKPHOS 75 77  --    ALT 8 8  --    AST 15 23  --    BILITOT 0.6 0.7  --      Microbiology Results (last 7 days)       Procedure Component Value Units Date/Time    Blood culture #1 **CANNOT BE ORDERED STAT** [[id number]]  (Normal) Collected: 01/17/25 2116    Order Status: Completed Specimen: Blood Updated: 01/19/25 2300     Blood Culture No Growth At 48 Hours    Blood culture #2 **CANNOT BE ORDERED STAT** [[id number]]  (Normal) Collected: 01/17/25 2116    Order Status: Completed Specimen: Blood Updated: 01/19/25 2300     Blood Culture No Growth At 48 Hours             See below for Radiology    Assessment/Plan:  Acute metabolic encephalopathy on arrival   Influenza A positive at admission  Generalized weakness, episode of fall  ? CKD   Mild macrocytic anemia, thrombocytopenia  HX: Obesity, HTN, HLD, history of TIA, T2 dm, chronic hep B, osteoarthritis     Plan:   Continue Tamiflu, nebs, Antitussives, decongestants.  Encourage p.o. intake   mental status seems to be back at baseline.  Continue to monitor  Blood cx neg, cont to monitor , off of antibiotics  Mobilize/ PT OT  Case mngt on board , working on swing bed  Check B12, folate, iron panel    VTE prophylaxis: Lovenox     Anticipated discharge and Disposition:   tbd/swing bed          Portions of this note dictated using EMR integrated voice recognition software, and may be subject to voice  recognition errors not corrected at proofreading. Please contact writer for clarification if needed.   _____________________________________________________________________    Malnutrition Status:  Nutrition consulted. Most recent weight and BMI monitored-     Measurements:  Wt Readings from Last 1 Encounters:   01/18/25 84 kg (185 lb 3 oz)   Body mass index is 34.99 kg/m².    Patient has been screened and assessed by RD.    Malnutrition Type:  Context:    Level:      Malnutrition Characteristic Summary:       Interventions/Recommendations (treatment strategy):        Scheduled Med:   albuterol-ipratropium  3 mL Nebulization Q6H WAKE    amLODIPine  5 mg Oral Daily    atorvastatin  40 mg Oral QHS    enoxparin  30 mg Subcutaneous Q24H (prophylaxis, 1700)    guaiFENesin  600 mg Oral BID    metoprolol tartrate  25 mg Oral BID    oseltamivir  30 mg Oral Daily    pantoprazole  40 mg Oral Daily      Continuous Infusions:     PRN Meds:    Current Facility-Administered Medications:     acetaminophen, 1,000 mg, Oral, Q6H PRN    albuterol, 1 puff, Inhalation, Q4H PRN    aluminum-magnesium hydroxide-simethicone, 30 mL, Oral, QID PRN    benzonatate, 100 mg, Oral, TID PRN    bisacodyL, 10 mg, Rectal, Daily PRN    dextrose 50%, 12.5 g, Intravenous, PRN    dextrose 50%, 12.5 g, Intravenous, PRN    dextrose 50%, 25 g, Intravenous, PRN    dextrose 50%, 25 g, Intravenous, PRN    glucagon (human recombinant), 1 mg, Intramuscular, PRN    glucagon (human recombinant), 1 mg, Intramuscular, PRN    glucose, 16 g, Oral, PRN    glucose, 16 g, Oral, PRN    glucose, 24 g, Oral, PRN    glucose, 24 g, Oral, PRN    insulin aspart U-100, 0-10 Units, Subcutaneous, QID (AC + HS) PRN    melatonin, 6 mg, Oral, Nightly PRN    ondansetron, 4 mg, Intravenous, Q4H PRN    polyethylene glycol, 17 g, Oral, BID PRN    sodium chloride 0.9%, 10 mL, Intravenous, PRN     Radiology:  I have personally reviewed the following imaging and agree with the radiologist.      X-Ray Chest 1 View  Narrative: EXAMINATION:  XR CHEST 1 VIEW    CLINICAL HISTORY:  fever;    TECHNIQUE:  One view    COMPARISON:  October 6, 2022.    FINDINGS:  Cardiopericardial silhouette is within normal limits.  No acute dense focal or segmental consolidation, congestive process, pleural effusions or pneumothorax.  Impression: No acute cardiopulmonary process identified.    Electronically signed by: Terrance Acosta  Date:    01/17/2025  Time:    20:31  CT Cervical Spine Without Contrast  Narrative: EXAMINATION:  CT CERVICAL SPINE WITHOUT CONTRAST    CLINICAL HISTORY:  Polytrauma, blunt;    TECHNIQUE:  Helical acquisition through the cervical spine without IV contrast. Three plane reconstructions were made available for review.  mGycm. Automatic exposure control, adjustment of mA/kV or iterative reconstruction technique was used to reduce radiation.    COMPARISON:  None available.    FINDINGS:  No fractures identified. No subluxation. Craniocervical junction and C1-C2 relationship are normal. The odontoid is intact. There is limited evaluation of the soft tissues. No prevertebral soft tissue swelling. Ligamentous injury cannot be excluded with CT.  Moderate degenerative changes including prominent posterior osteophyte C4-C5.  Impression: No acute bony injury of the cervical spine.    Electronically signed by: Topher Nice  Date:    01/17/2025  Time:    18:30  CT Head Without Contrast  Narrative: EXAMINATION:  CT HEAD WITHOUT CONTRAST    CLINICAL HISTORY:  Mental status change, unknown cause;    TECHNIQUE:  CT imaging of the head performed from the skull base to the vertex without intravenous contrast.  DLP 1043 mGycm. Automatic exposure control, adjustment of mA/kV or iterative reconstruction technique was used to reduce radiation.    COMPARISON:  25 November 2021    FINDINGS:  There is no acute cortical infarct, hemorrhage or mass lesion.  There is moderate patchy hypoattenuation in the cerebral white  matter which is nonspecific but most commonly associated with chronic small vessel ischemic changes.  There is mild global atrophy.  There are dense vascular calcifications.    Visualized paranasal sinuses and mastoid air cells are clear. The calvarium is grossly intact  Impression: No acute intracranial findings.    Electronically signed by: Topher Nice  Date:    01/17/2025  Time:    18:27      Jess Tate MD  Department of Hospital Medicine   [facility]   01/20/2025

## 2025-01-20 NOTE — PLAN OF CARE
Problem: Occupational Therapy  Goal: Occupational Therapy Goal  Description: LTG: Pt will perform basic ADLs and ADL transfers with Modified independence using LRAD by discharge.    STG: to be met by 2/20/25    Pt will complete grooming standing at sink with LRAD with SBA.  Pt will complete UB dressing with SBA.  Pt will complete LB dressing with SBA using LRAD.  Pt will complete toileting with SBA using LRAD.  Pt will complete functional mobility to/from toilet and toilet transfer with SBA using LRAD.   Outcome: Progressing

## 2025-01-20 NOTE — PT/OT/SLP EVAL
Occupational Therapy  Evaluation    Name: Tonie Tejada  MRN: [id number]    Recommendations:     Discharge therapy intensity: Moderate Intensity Therapy   Discharge Equipment Recommendations:  none  Barriers to discharge:   (ongoing medical needs, severity of deficits)    Assessment:     Tonie Tejada is a 84 y.o. female with a medical diagnosis of influenza A, acute metabolic encephalopathy. She presents with the following performance deficits affecting function: weakness, impaired endurance, impaired self care skills, impaired functional mobility, impaired balance, gait instability, decreased safety awareness, impaired cognition. Patient performed well during evaluation; though presents with slight confusion and decreased safety awareness. Patient required overall [place] for all mobility with noted LLE buckling at times. Patient then required maxA for toileting task. Patient previously independent. Therefore patient would benefit from moderate intensity therapy upon d/c.    Rehab Prognosis: Good; patient would benefit from acute skilled OT services to address these deficits and reach maximum level of function.       Plan:     Patient to be seen 5 x/week to address the above listed problems via therapeutic exercises, therapeutic activities, self-care/home management  Plan of Care Expires: 02/20/25  Plan of Care Reviewed with: patient    Subjective     Chief Complaint: none stated  Patient/Family Comments/goals: to go home    Occupational Profile:  Living Environment: lives with family in a [facility], [address]  Previous level of function: independent  Roles and Routines: mother, wife  Equipment Used at Home: walker, rolling  Assistance upon Discharge: family    Pain/Comfort:  Pain Rating 1: 0/10    Patients cultural, spiritual, Protestant conflicts given the current situation: no    Objective:     OT communicated with NSG prior to session.      Patient was found HOB elevated with peripheral IV, PureWick upon OT entry to  room.    General Precautions: Standard, fall, droplet  Orthopedic Precautions: N/A  Braces: N/A    Vital Signs: Respiratory Status: on room air    Bed Mobility:    Patient completed Supine to Sit with minimum assistance    Functional Mobility/Transfers:  Patient completed Sit <> Stand Transfer with minimum assistance  with  rolling walker   Patient completed Toilet Transfer Step Transfer technique with minimum assistance with  rolling walker  Functional Mobility: ambulating with min A using RW; unsteadiness with slight LLE buckle though no overt LOB    Activities of Daily Living:  Toileting: maximal assistance to perform posterior pericare in standing    AMPA 6 Click ADL:  AMPA Total Score: 16    Functional Cognition:  Orientation: oriented to Person, Place, and Time  Problem Solving: Impaired.    Safety Awareness: Impaired.    Insight into Deficits: Impaired.      Visual Perceptual Skills:  Intact    Upper Extremity Function:  Right Upper Extremity:   WFL    Left Upper Extremity:  WFL      Therapeutic Positioning  Risk for acquired pressure injuries is increased due to relative decrease in mobility d/t hospitalization  and impaired mobility.    OT interventions performed during the course of today's session:   Education was provided on benefits of and recommendations for therapeutic positioning  Therapeutic positioning was provided at the conclusion of session to offload all bony prominences for the prevention and/or reduction of pressure injuries    Skin assessment: all bony prominences were assessed    Findings: no redness or breakdown noted    OT recommendations for therapeutic positioning throughout hospitalization:   Follow [facility] Pressure Injury Prevention Protocol  Geomat recommended for sacral protection while [facility]    Patient Education:  Patient provided with verbal education education regarding OT role/goals/POC, fall prevention, safety awareness, Discharge/DME recommendations, and pressure ulcer  prevention.  Understanding was verbalized, however additional teaching warranted.     Patient left up in chair with all lines intact, call button in reach, chair alarm on, and NSG notified.    GOALS:   Multidisciplinary Problems       Occupational Therapy Goals          Problem: Occupational Therapy    Goal Priority Disciplines Outcome Interventions   Occupational Therapy Goal     OT, PT/OT Progressing    Description: LTG: Pt will perform basic ADLs and ADL transfers with Modified independence using LRAD by discharge.    STG: to be met by 25    Pt will complete grooming standing at sink with LRAD with SBA.  Pt will complete UB dressing with SBA.  Pt will complete LB dressing with SBA using LRAD.  Pt will complete toileting with SBA using LRAD.  Pt will complete functional mobility to/from toilet and toilet transfer with SBA using LRAD.                        History:     Past Medical History:   Diagnosis Date    Chronic hepatitis B     DM (diabetes mellitus)     HLD (hyperlipidemia)     HTN (hypertension)     OA (osteoarthritis)     TIA (transient ischemic attack)          Past Surgical History:   Procedure Laterality Date     SECTION      COLONOSCOPY N/A 10/21/2023    Procedure: COLONOSCOPY;  Surgeon: Jasper Davis MD;  [facility];  Service: Gastroenterology;  Laterality: N/A;    COLONOSCOPY, WITH POLYPECTOMY USING SNARE  10/21/2023    Procedure: COLONOSCOPY, WITH POLYPECTOMY USING SNARE;  Surgeon: Jasper Davis MD;  Location: [facility] OR;  Service: Gastroenterology;;    DIGITAL RECTAL EXAMINATION UNDER ANESTHESIA N/A 2023    Procedure: EXAM UNDER ANESTHESIA, DIGITAL, RECTUM;  Surgeon: Jamie Garcia MD;  Location: [facility] OR;  Service: General;  Laterality: N/A;    ESOPHAGOGASTRODUODENOSCOPY N/A 10/20/2023    Procedure: EGD;  Surgeon: Cody Urbina MD;  Location: [facility] ENDOSCOPY;  Service: Gastroenterology;  Laterality: N/A;    EXAMINATION UNDER ANESTHESIA N/A  11/10/2023    Procedure: Exam under anesthesia;  Surgeon: Jamie Garcia MD;  Location: [facility] OR;  Service: Colon and Rectal;  Laterality: N/A;  Prone jackknife    EXCISIONAL HEMORRHOIDECTOMY  11/10/2023    Procedure: HEMORRHOIDECTOMY;  Surgeon: Jamie Garcia MD;  Location: [facility] OR;  Service: Colon and Rectal;;    HYSTERECTOMY      INCISION OF PERIRECTAL ABSCESS  11/10/2023    Procedure: INCISION, ABSCESS, PERIRECTAL;  Surgeon: Jamie Garcia MD;  Location: [facility] OR;  Service: Colon and Rectal;;    JOINT REPLACEMENT         Time Tracking:     OT Date of Treatment:    OT Start Time: 0834  OT Stop Time: 0853  OT Total Time (min): 19 min    Billable Minutes:Evaluation mod    1/20/2025

## 2025-01-20 NOTE — PLAN OF CARE
Problem: Physical Therapy  Goal: Physical Therapy Goal  Description: Goals to be met by: d/c     Patient will increase functional independence with mobility by performin. Supine to sit with Stand-by Assistance  2. Sit to supine with Stand-by Assistance  3. Sit to stand transfer with Stand-by Assistance  4. Bed to chair transfer with Stand-by Assistance using Rolling Walker  5. Gait  x 200 feet with Stand-by Assistance using Rolling Walker.     Outcome: Progressing

## 2025-01-20 NOTE — PT/OT/SLP EVAL
Physical Therapy Evaluation    Patient Name:  Tonie Tejada   MRN:  [id number]    Recommendations:     Discharge therapy intensity: Moderate Intensity Therapy   Discharge Equipment Recommendations: none   Barriers to discharge: Decreased caregiver support, Impaired mobility, and Ongoing medical needs    Assessment:     Tonie Tejada is a 84 y.o. female admitted with a medical diagnosis of acute encephalopathy, influenza A, weakness, falls.  She presents with the following impairments/functional limitations: weakness, impaired balance, decreased safety awareness, impaired endurance, impaired functional mobility, gait instability, decreased lower extremity function. Pt tolerated session fair, ambulatory to/from bathroom with RW Kathy. L leg buckling present initially but improved with distance. Pt lives with family and is normally Julius with RW, recommending moderate intensity therapy to prevent fall risk.     Rehab Prognosis: Good; patient would benefit from acute skilled PT services to address these deficits and reach maximum level of function.    Recent Surgery: * No surgery found *      Plan:     During this hospitalization, patient would benefit from acute PT services 5 x/week to address the identified rehab impairments via gait training, therapeutic activities, therapeutic exercises and progress toward the following goals:    Plan of Care Expires:  03/15/25    Subjective     Chief Complaint: none noted  Patient/Family Comments/goals: to return to PLOF  Pain/Comfort:  Pain Rating 1: 0/10    Patients cultural, spiritual, Quaker conflicts given the current situation:      Living Environment:  Pt lives with family (spouse, sons) in a [facility] with 5 steps to enter and railings.   Prior to admission, patients level of function was Julius with RW.  Equipment used at home: walker, rolling.  DME owned (not currently used): none.  Upon discharge, patient will have assistance from family.    Objective:     Communicated  with RN prior to session.  Patient found HOB elevated with peripheral IV, PureWick  upon PT entry to room.    General Precautions: Standard, fall, droplet  Orthopedic Precautions:N/A   Braces: N/A  Respiratory Status: Room air    Exams:  Sensation:    -       Intact  RLE Strength: 4- globally  LLE Strength: 4- globally  Skin integrity: Visible skin intact      Functional Mobility:  Bed Mobility:     Supine to Sit: minimum assistance  Transfers:     Sit to Stand:  minimum assistance with rolling walker  Toilet Transfer: minimum assistance with  rolling walker  using  Step Transfer  Gait: Pt ambulated '/' with RW Kathy-CGA with seated rest on commode for void. L knee buckling present initially after sit to stand but improved with distance. Cuing for AD management.       AM-PAC 6 CLICK MOBILITY  Total Score:17       Treatment & Education:    Patient provided with verbal education education regarding PT role/goals/POC, fall prevention, and safety awareness.  Understanding was verbalized.     Patient left up in chair with all lines intact, call button in reach, chair alarm on, and RN/CNA notified.    GOALS:   Multidisciplinary Problems       Physical Therapy Goals          Problem: Physical Therapy    Goal Priority Disciplines Outcome Interventions   Physical Therapy Goal     PT, PT/OT Progressing    Description: Goals to be met by: d/c     Patient will increase functional independence with mobility by performin. Supine to sit with Stand-by Assistance  2. Sit to supine with Stand-by Assistance  3. Sit to stand transfer with Stand-by Assistance  4. Bed to chair transfer with Stand-by Assistance using Rolling Walker  5. Gait  x 200 feet with Stand-by Assistance using Rolling Walker.                          History:     Past Medical History:   Diagnosis Date    Chronic hepatitis B     DM (diabetes mellitus)     HLD (hyperlipidemia)     HTN (hypertension)     OA (osteoarthritis)     TIA (transient ischemic attack)         Past Surgical History:   Procedure Laterality Date     SECTION      COLONOSCOPY N/A 10/21/2023    Procedure: COLONOSCOPY;  Surgeon: Jasper Davis MD;  Location: [facility] OR;  Service: Gastroenterology;  Laterality: N/A;    COLONOSCOPY, WITH POLYPECTOMY USING SNARE  10/21/2023    Procedure: COLONOSCOPY, WITH POLYPECTOMY USING SNARE;  Surgeon: Jasper Davis MD;  Location: [facility] OR;  Service: Gastroenterology;;    DIGITAL RECTAL EXAMINATION UNDER ANESTHESIA N/A 2023    Procedure: EXAM UNDER ANESTHESIA, DIGITAL, RECTUM;  Surgeon: Jamie Garcia MD;  Location: [facility] OR;  Service: General;  Laterality: N/A;    ESOPHAGOGASTRODUODENOSCOPY N/A 10/20/2023    Procedure: EGD;  Surgeon: Cody Urbina MD;  Location: [facility] ENDOSCOPY;  Service: Gastroenterology;  Laterality: N/A;    EXAMINATION UNDER ANESTHESIA N/A 11/10/2023    Procedure: Exam under anesthesia;  Surgeon: Jamie Garcia MD;  Location: [facility] OR;  Service: Colon and Rectal;  Laterality: N/A;  Prone jackknife    EXCISIONAL HEMORRHOIDECTOMY  11/10/2023    Procedure: HEMORRHOIDECTOMY;  Surgeon: Jamie Garcia MD;  Location: [facility] OR;  Service: Colon and Rectal;;    HYSTERECTOMY      INCISION OF PERIRECTAL ABSCESS  11/10/2023    Procedure: INCISION, ABSCESS, PERIRECTAL;  Surgeon: Jamie Garcia MD;  Location: [facility];  Service: Colon and Rectal;;    JOINT REPLACEMENT         Time Tracking:     PT Received On: 25  PT Start Time: 835     PT Stop Time: 0852  PT Total Time (min): 17 min     Billable Minutes: Evaluation 17      2025

## 2025-01-21 LAB
ANION GAP SERPL CALC-SCNC: 9 MEQ/L
BASOPHILS # BLD AUTO: 0.02 X10(3)/MCL
BASOPHILS NFR BLD AUTO: 0.5 %
BUN SERPL-MCNC: 21.6 MG/DL (ref 9.8–20.1)
CALCIUM SERPL-MCNC: 8 MG/DL (ref 8.4–10.2)
CHLORIDE SERPL-SCNC: 110 MMOL/L (ref 98–107)
CO2 SERPL-SCNC: 20 MMOL/L (ref 23–31)
CREAT SERPL-MCNC: 1.39 MG/DL (ref 0.55–1.02)
CREAT/UREA NIT SERPL: 16
EOSINOPHIL # BLD AUTO: 0.07 X10(3)/MCL (ref 0–0.9)
EOSINOPHIL NFR BLD AUTO: 1.8 %
ERYTHROCYTE [DISTWIDTH] IN BLOOD BY AUTOMATED COUNT: 12.3 % (ref 11.5–17)
FERRITIN SERPL-MCNC: 195.86 NG/ML (ref 4.63–204)
FOLATE SERPL-MCNC: 10.5 NG/ML (ref 7–31.4)
GFR SERPLBLD CREATININE-BSD FMLA CKD-EPI: 37 ML/MIN/1.73/M2
GLUCOSE SERPL-MCNC: 100 MG/DL (ref 82–115)
HCT VFR BLD AUTO: 30.6 % (ref 37–47)
HGB BLD-MCNC: 9.9 G/DL (ref 12–16)
IMM GRANULOCYTES # BLD AUTO: 0.01 X10(3)/MCL (ref 0–0.04)
IMM GRANULOCYTES NFR BLD AUTO: 0.3 %
IRON SATN MFR SERPL: 34 % (ref 20–50)
IRON SERPL-MCNC: 61 UG/DL (ref 50–170)
LYMPHOCYTES # BLD AUTO: 1.02 X10(3)/MCL (ref 0.6–4.6)
LYMPHOCYTES NFR BLD AUTO: 25.6 %
MCH RBC QN AUTO: 30.8 PG (ref 27–31)
MCHC RBC AUTO-ENTMCNC: 32.4 G/DL (ref 33–36)
MCV RBC AUTO: 95.3 FL (ref 80–94)
MONOCYTES # BLD AUTO: 0.59 X10(3)/MCL (ref 0.1–1.3)
MONOCYTES NFR BLD AUTO: 14.8 %
NEUTROPHILS # BLD AUTO: 2.27 X10(3)/MCL (ref 2.1–9.2)
NEUTROPHILS NFR BLD AUTO: 57 %
NRBC BLD AUTO-RTO: 0 %
PLATELET # BLD AUTO: 107 X10(3)/MCL (ref 130–400)
PLATELETS.RETICULATED NFR BLD AUTO: 10.3 % (ref 0.9–11.2)
PMV BLD AUTO: 13.1 FL (ref 7.4–10.4)
POCT GLUCOSE: 116 MG/DL (ref 70–110)
POCT GLUCOSE: 142 MG/DL (ref 70–110)
POCT GLUCOSE: 187 MG/DL (ref 70–110)
POTASSIUM SERPL-SCNC: 4.8 MMOL/L (ref 3.5–5.1)
RBC # BLD AUTO: 3.21 X10(6)/MCL (ref 4.2–5.4)
SODIUM SERPL-SCNC: 139 MMOL/L (ref 136–145)
TIBC SERPL-MCNC: 117 UG/DL (ref 70–310)
TIBC SERPL-MCNC: 178 UG/DL (ref 250–450)
TRANSFERRIN SERPL-MCNC: 155 MG/DL
VIT B12 SERPL-MCNC: 659 PG/ML (ref 213–816)
WBC # BLD AUTO: 3.98 X10(3)/MCL (ref 4.5–11.5)

## 2025-01-21 PROCEDURE — 25000003 PHARM REV CODE 250

## 2025-01-21 PROCEDURE — 85025 COMPLETE CBC W/AUTO DIFF WBC: CPT | Performed by: INTERNAL MEDICINE

## 2025-01-21 PROCEDURE — 36415 COLL VENOUS BLD VENIPUNCTURE: CPT | Performed by: INTERNAL MEDICINE

## 2025-01-21 PROCEDURE — 94760 N-INVAS EAR/PLS OXIMETRY 1: CPT

## 2025-01-21 PROCEDURE — 83550 IRON BINDING TEST: CPT | Performed by: INTERNAL MEDICINE

## 2025-01-21 PROCEDURE — 25000003 PHARM REV CODE 250: Performed by: INTERNAL MEDICINE

## 2025-01-21 PROCEDURE — 82728 ASSAY OF FERRITIN: CPT | Performed by: INTERNAL MEDICINE

## 2025-01-21 PROCEDURE — 25000242 PHARM REV CODE 250 ALT 637 W/ HCPCS: Performed by: INTERNAL MEDICINE

## 2025-01-21 PROCEDURE — 21400001 HC TELEMETRY ROOM

## 2025-01-21 PROCEDURE — 80048 BASIC METABOLIC PNL TOTAL CA: CPT | Performed by: INTERNAL MEDICINE

## 2025-01-21 PROCEDURE — 82746 ASSAY OF FOLIC ACID SERUM: CPT | Performed by: INTERNAL MEDICINE

## 2025-01-21 PROCEDURE — 27000207 HC ISOLATION

## 2025-01-21 PROCEDURE — 63600175 PHARM REV CODE 636 W HCPCS: Performed by: INTERNAL MEDICINE

## 2025-01-21 PROCEDURE — 99900031 HC PATIENT EDUCATION (STAT)

## 2025-01-21 PROCEDURE — 94640 AIRWAY INHALATION TREATMENT: CPT

## 2025-01-21 PROCEDURE — 99900035 HC TECH TIME PER 15 MIN (STAT)

## 2025-01-21 RX ADMIN — OSELTAMIVIR PHOSPHATE 30 MG: 30 CAPSULE ORAL at 09:01

## 2025-01-21 RX ADMIN — PANTOPRAZOLE SODIUM 40 MG: 40 TABLET, DELAYED RELEASE ORAL at 09:01

## 2025-01-21 RX ADMIN — AMLODIPINE BESYLATE 5 MG: 5 TABLET ORAL at 09:01

## 2025-01-21 RX ADMIN — BENZONATATE 100 MG: 100 CAPSULE ORAL at 09:01

## 2025-01-21 RX ADMIN — ENOXAPARIN SODIUM 30 MG: 30 INJECTION SUBCUTANEOUS at 04:01

## 2025-01-21 RX ADMIN — IPRATROPIUM BROMIDE AND ALBUTEROL SULFATE 3 ML: .5; 3 SOLUTION RESPIRATORY (INHALATION) at 08:01

## 2025-01-21 RX ADMIN — ATORVASTATIN CALCIUM 40 MG: 40 TABLET, FILM COATED ORAL at 08:01

## 2025-01-21 RX ADMIN — GUAIFENESIN 600 MG: 600 TABLET, EXTENDED RELEASE ORAL at 09:01

## 2025-01-21 RX ADMIN — METOPROLOL TARTRATE 25 MG: 25 TABLET, FILM COATED ORAL at 08:01

## 2025-01-21 RX ADMIN — INSULIN ASPART 1 UNITS: 100 INJECTION, SOLUTION INTRAVENOUS; SUBCUTANEOUS at 08:01

## 2025-01-21 RX ADMIN — METOPROLOL TARTRATE 25 MG: 25 TABLET, FILM COATED ORAL at 09:01

## 2025-01-21 RX ADMIN — GUAIFENESIN 600 MG: 600 TABLET, EXTENDED RELEASE ORAL at 08:01

## 2025-01-21 NOTE — PROGRESS NOTES
[facility] Hospital Medicine Progress Note        Chief Complaint: Inpatient Follow-up Influenza A    HPI per admitting team:   84-year-old woman who uses a walker at baseline for ambulation, past medical history of DM, HTN, HLD, history of TIA brought to ER by her family members after more lethargy, change in mental status, episode of fall and episodes of confusion.  Patient's family denies seizure activity, head injury.  Patient reportedly had similar symptoms in the past with UTI.  At presentation she was febrile 102.8, bradycardic, elevated blood pressure and was doing well on room air.  CT head, CT spine were unremarkable.  Chest x-ray showed no acute processes.  Lab work revealed chronic anemia, stable platelet count today she is mildly thrombocytopenic.  P.o. mild acidotic with elevated BUN and serum creatinine at admission.  She was tested positive for flu for which Tamiflu was added.  Troponin was within normal limits.  UA was unremarkable.  Blood cultures were sent.  Patient was admitted to hospital medicine service for further evaluation and management.    Patient continued on Tamiflu, supportive care with neb treatments, antitussives.    Interval Hx:   No acute events reported overnight seen at bedside this morning, feels weak but improving, denied SOB, encourage mobility, p.o. intak  Case was discussed with patient's nurse  HDS on rA   Labs this morning showed hemoglobin 10.5, platelets 103 K, BUN 21.6, creatinine 1.45, bicarb 19, sodium 134    Objective/physical exam:  General: In no acute distress, afebrile  Chest:  Unlabored breathing on room  Heart:  Normal  Abdomen: Soft, nontender  MSK: Warm  Neurologic: Alert and answering appropriately    VITAL SIGNS: 24 HRS MIN & MAX LAST   Temp  Min: 98 °F (36.7 °C)  Max: 98.6 °F (37 °C) 98.3 °F (36.8 °C)   BP  Min: 115/62  Max: 153/65 120/65   Pulse  Min: 72  Max: 86  77   Resp  Min: 15  Max: 20 18   SpO2  Min: 97 %  Max: 100 %  99 %     I have reviewed the following labs:  Recent Labs   Lab 01/18/25  0615 01/20/25  0659 01/21/25  0535   WBC 6.62 3.37* 3.98*   RBC 3.71* 3.37* 3.21*   HGB 11.5* 10.5* 9.9*   HCT 35.6* 32.1* 30.6*   MCV 96.0* 95.3* 95.3*   MCH 31.0 31.2* 30.8   MCHC 32.3* 32.7* 32.4*   RDW 12.3 12.2 12.3   * 103* 107*   MPV 12.6* 13.3* 13.1*     Recent Labs   Lab 01/17/25  1544 01/18/25  0615 01/20/25  0540 01/21/25  0535    142 134* 139   K 4.4 4.4 4.7 4.8   * 111* 107 110*   CO2 18* 20* 19* 20*   BUN 26.5* 28.9* 21.6* 21.6*   CREATININE 1.83* 1.80* 1.45* 1.39*   CALCIUM 9.2 8.9 8.1* 8.0*   MG  --  2.30  --   --    ALBUMIN 3.8 3.9  --   --    ALKPHOS 75 77  --   --    ALT 8 8  --   --    AST 15 23  --   --    BILITOT 0.6 0.7  --   --      Microbiology Results (last 7 days)       Procedure Component Value Units Date/Time    Blood culture #1 **CANNOT BE ORDERED STAT** [[id number]]  (Normal) Collected: 01/17/25 2116    Order Status: Completed Specimen: Blood Updated: 01/20/25 2301     Blood Culture No Growth At 72 Hours    Blood culture #2 **CANNOT BE ORDERED STAT** [[id number]]  (Normal) Collected: 01/17/25 2116    Order Status: Completed Specimen: Blood Updated: 01/20/25 2301     Blood Culture No Growth At 72 Hours             See below for Radiology    Intake/Output:  Intake/Output Summary (Last 24 hours) at 1/21/2025 1102  Last data filed at 1/21/2025 1019  Gross per 24 hour   Intake 440 ml   Output --   Net 440 ml         Assessment/Plan:  Acute metabolic encephalopathy on arrival - resolved   Influenza A   Generalized weakness, episode of fall  ? CKD   Mild macrocytic anemia, thrombocytopenia  HX: Obesity, HTN, HLD, history of TIA, T2 dm, chronic hep B, osteoarthritis       Continue Tamiflu 30mg daily - day 3 of 5  Cont nebs, Antitussives, decongestants.    Encourage p.o. intake   Mental status at baseline, able to answer all the questions all appropriate .  Continue to monitor  Cr down to 1.3  Blood cx  neg, cont to monitor, off of antibiotics  Pt/ot- recommended SNF   Case mngt on board , working on swing bed/ SNF  Morning labs stable     VTE prophylaxis: Lovenox 30mg     Pt's condition: stable     Anticipated discharge and Disposition:  SNF once accepted           Portions of this note dictated using EMR integrated voice recognition software, and may be subject to voice recognition errors not corrected at proofreading. Please contact writer for clarification if needed.   _____________________________________________________________________    Malnutrition Status:  Nutrition consulted. Most recent weight and BMI monitored-     Measurements:  Wt Readings from Last 1 Encounters:   01/18/25 84 kg (185 lb 3 oz)   Body mass index is 34.99 kg/m².    Patient has been screened and assessed by RD.    Malnutrition Type:  Context:    Level:      Malnutrition Characteristic Summary:       Interventions/Recommendations (treatment strategy):        Scheduled Med:   amLODIPine  5 mg Oral Daily    atorvastatin  40 mg Oral QHS    enoxparin  30 mg Subcutaneous Q24H (prophylaxis, 1700)    guaiFENesin  600 mg Oral BID    metoprolol tartrate  25 mg Oral BID    oseltamivir  30 mg Oral Daily    pantoprazole  40 mg Oral Daily      Continuous Infusions:     PRN Meds:    Current Facility-Administered Medications:     acetaminophen, 1,000 mg, Oral, Q6H PRN    albuterol, 1 puff, Inhalation, Q4H PRN    aluminum-magnesium hydroxide-simethicone, 30 mL, Oral, QID PRN    benzonatate, 100 mg, Oral, TID PRN    bisacodyL, 10 mg, Rectal, Daily PRN    dextrose 50%, 12.5 g, Intravenous, PRN    dextrose 50%, 12.5 g, Intravenous, PRN    dextrose 50%, 25 g, Intravenous, PRN    dextrose 50%, 25 g, Intravenous, PRN    glucagon (human recombinant), 1 mg, Intramuscular, PRN    glucagon (human recombinant), 1 mg, Intramuscular, PRN    glucose, 16 g, Oral, PRN    glucose, 16 g, Oral, PRN    glucose, 24 g, Oral, PRN    glucose, 24 g, Oral, PRN    insulin aspart  U-100, 0-10 Units, Subcutaneous, QID (AC + HS) PRN    melatonin, 6 mg, Oral, Nightly PRN    ondansetron, 4 mg, Intravenous, Q4H PRN    polyethylene glycol, 17 g, Oral, BID PRN    sodium chloride 0.9%, 10 mL, Intravenous, PRN     Radiology:  I have personally reviewed the following imaging and agree with the radiologist.     X-Ray Chest 1 View  Narrative: EXAMINATION:  XR CHEST 1 VIEW    CLINICAL HISTORY:  fever;    TECHNIQUE:  One view    COMPARISON:  October 6, 2022.    FINDINGS:  Cardiopericardial silhouette is within normal limits.  No acute dense focal or segmental consolidation, congestive process, pleural effusions or pneumothorax.  Impression: No acute cardiopulmonary process identified.    Electronically signed by: Terrance Acosta  Date:    01/17/2025  Time:    20:31  CT Cervical Spine Without Contrast  Narrative: EXAMINATION:  CT CERVICAL SPINE WITHOUT CONTRAST    CLINICAL HISTORY:  Polytrauma, blunt;    TECHNIQUE:  Helical acquisition through the cervical spine without IV contrast. Three plane reconstructions were made available for review.  mGycm. Automatic exposure control, adjustment of mA/kV or iterative reconstruction technique was used to reduce radiation.    COMPARISON:  None available.    FINDINGS:  No fractures identified. No subluxation. Craniocervical junction and C1-C2 relationship are normal. The odontoid is intact. There is limited evaluation of the soft tissues. No prevertebral soft tissue swelling. Ligamentous injury cannot be excluded with CT.  Moderate degenerative changes including prominent posterior osteophyte C4-C5.  Impression: No acute bony injury of the cervical spine.    Electronically signed by: Topher Nice  Date:    01/17/2025  Time:    18:30  CT Head Without Contrast  Narrative: EXAMINATION:  CT HEAD WITHOUT CONTRAST    CLINICAL HISTORY:  Mental status change, unknown cause;    TECHNIQUE:  CT imaging of the head performed from the skull base to the vertex without  intravenous contrast.  DLP 1043 mGycm. Automatic exposure control, adjustment of mA/kV or iterative reconstruction technique was used to reduce radiation.    COMPARISON:  25 November 2021    FINDINGS:  There is no acute cortical infarct, hemorrhage or mass lesion.  There is moderate patchy hypoattenuation in the cerebral white matter which is nonspecific but most commonly associated with chronic small vessel ischemic changes.  There is mild global atrophy.  There are dense vascular calcifications.    Visualized paranasal sinuses and mastoid air cells are clear. The calvarium is grossly intact  Impression: No acute intracranial findings.    Electronically signed by: Topher Nice  Date:    01/17/2025  Time:    18:27      Emery Kc MD  Department of Hospital Medicine   [facility]   01/21/2025

## 2025-01-22 LAB
BACTERIA BLD CULT: NORMAL
BACTERIA BLD CULT: NORMAL
POCT GLUCOSE: 111 MG/DL (ref 70–110)
POCT GLUCOSE: 117 MG/DL (ref 70–110)
POCT GLUCOSE: 125 MG/DL (ref 70–110)
POCT GLUCOSE: 133 MG/DL (ref 70–110)
POCT GLUCOSE: 95 MG/DL (ref 70–110)

## 2025-01-22 PROCEDURE — 21400001 HC TELEMETRY ROOM

## 2025-01-22 PROCEDURE — 25000003 PHARM REV CODE 250: Performed by: INTERNAL MEDICINE

## 2025-01-22 PROCEDURE — 63600175 PHARM REV CODE 636 W HCPCS: Performed by: INTERNAL MEDICINE

## 2025-01-22 PROCEDURE — 25000003 PHARM REV CODE 250

## 2025-01-22 PROCEDURE — 27000207 HC ISOLATION

## 2025-01-22 RX ADMIN — PANTOPRAZOLE SODIUM 40 MG: 40 TABLET, DELAYED RELEASE ORAL at 08:01

## 2025-01-22 RX ADMIN — GUAIFENESIN 600 MG: 600 TABLET, EXTENDED RELEASE ORAL at 08:01

## 2025-01-22 RX ADMIN — BENZONATATE 100 MG: 100 CAPSULE ORAL at 08:01

## 2025-01-22 RX ADMIN — OSELTAMIVIR PHOSPHATE 30 MG: 30 CAPSULE ORAL at 08:01

## 2025-01-22 RX ADMIN — METOPROLOL TARTRATE 25 MG: 25 TABLET, FILM COATED ORAL at 08:01

## 2025-01-22 RX ADMIN — AMLODIPINE BESYLATE 5 MG: 5 TABLET ORAL at 08:01

## 2025-01-22 RX ADMIN — GUAIFENESIN 600 MG: 600 TABLET, EXTENDED RELEASE ORAL at 09:01

## 2025-01-22 RX ADMIN — ATORVASTATIN CALCIUM 40 MG: 40 TABLET, FILM COATED ORAL at 09:01

## 2025-01-22 RX ADMIN — Medication 6 MG: at 10:01

## 2025-01-22 RX ADMIN — ENOXAPARIN SODIUM 30 MG: 30 INJECTION SUBCUTANEOUS at 04:01

## 2025-01-22 RX ADMIN — METOPROLOL TARTRATE 25 MG: 25 TABLET, FILM COATED ORAL at 09:01

## 2025-01-22 NOTE — PROGRESS NOTES
[facility] Hospital Medicine Progress Note        Chief Complaint: Inpatient Follow-up Influenza A    HPI per admitting team:   84-year-old woman who uses a walker at baseline for ambulation, past medical history of DM, HTN, HLD, history of TIA brought to ER by her family members after more lethargy, change in mental status, episode of fall and episodes of confusion.  Patient's family denies seizure activity, head injury.  Patient reportedly had similar symptoms in the past with UTI.  At presentation she was febrile 102.8, bradycardic, elevated blood pressure and was doing well on room air.  CT head, CT spine were unremarkable.  Chest x-ray showed no acute processes.  Lab work revealed chronic anemia, stable platelet count today she is mildly thrombocytopenic.  P.o. mild acidotic with elevated BUN and serum creatinine at admission.  She was tested positive for flu for which Tamiflu was added.  Troponin was within normal limits.  UA was unremarkable.  Blood cultures were sent.  Patient was admitted to hospital medicine service for further evaluation and management.    Patient continued on Tamiflu, supportive care with neb treatments, antitussives.    Interval Hx:   Patient is laying in bed, no major complaints, reports she feels much better  Reported she lives at home with her  and 2 sons  No family is at bedside  Case was discussed with patient's nurse on the floor      Objective/physical exam:  General: In no acute distress, afebrile, laying in bed comfortably  Chest:  Clear to auscultation anteriorly, on room air  Heart:  Rate and rhythm  Abdomen: Soft, nontender  MSK: Warm  Neurologic:  Awake alert and oriented, answered all my questions appropriately    VITAL SIGNS: 24 HRS MIN & MAX LAST   Temp  Min: 98 °F (36.7 °C)  Max: 99.1 °F (37.3 °C) 99.1 °F (37.3 °C)   BP  Min: 105/56  Max: 146/68 127/61   Pulse  Min: 69  Max: 79  74   Resp  Min: 18  Max: 20 18   SpO2  Min: 97 %  Max:  100 % 100 %     I have reviewed the following labs:  Recent Labs   Lab 01/18/25  0615 01/20/25  0659 01/21/25  0535   WBC 6.62 3.37* 3.98*   RBC 3.71* 3.37* 3.21*   HGB 11.5* 10.5* 9.9*   HCT 35.6* 32.1* 30.6*   MCV 96.0* 95.3* 95.3*   MCH 31.0 31.2* 30.8   MCHC 32.3* 32.7* 32.4*   RDW 12.3 12.2 12.3   * 103* 107*   MPV 12.6* 13.3* 13.1*     Recent Labs   Lab 01/17/25  1544 01/18/25  0615 01/20/25  0540 01/21/25  0535    142 134* 139   K 4.4 4.4 4.7 4.8   * 111* 107 110*   CO2 18* 20* 19* 20*   BUN 26.5* 28.9* 21.6* 21.6*   CREATININE 1.83* 1.80* 1.45* 1.39*   CALCIUM 9.2 8.9 8.1* 8.0*   MG  --  2.30  --   --    ALBUMIN 3.8 3.9  --   --    ALKPHOS 75 77  --   --    ALT 8 8  --   --    AST 15 23  --   --    BILITOT 0.6 0.7  --   --      Microbiology Results (last 7 days)       Procedure Component Value Units Date/Time    Blood culture #1 **CANNOT BE ORDERED STAT** [[id number]]  (Normal) Collected: 01/17/25 2116    Order Status: Completed Specimen: Blood Updated: 01/21/25 2300     Blood Culture No Growth At 96 Hours    Blood culture #2 **CANNOT BE ORDERED STAT** [[id number]]  (Normal) Collected: 01/17/25 2116    Order Status: Completed Specimen: Blood Updated: 01/21/25 2300     Blood Culture No Growth At 96 Hours             See below for Radiology    Intake/Output:  Intake/Output Summary (Last 24 hours) at 1/22/2025 1028  Last data filed at 1/21/2025 1835  Gross per 24 hour   Intake 740 ml   Output --   Net 740 ml         Assessment/Plan:  Acute metabolic encephalopathy on arrival - resolved   Influenza A   Generalized weakness, episode of fall  ? CKD   Mild macrocytic anemia, thrombocytopenia  HX: Obesity, HTN, HLD, history of TIA, T2 dm, chronic hep B, osteoarthritis       Continue Tamiflu 30mg daily - day 4 of 5  Continue Mucinex 600 mg b.i.d., benzonatate 100 mg t.i.d. p.r.n.  Encourage p.o. intake   Mentation is cleared  Cr down to 1.3  Blood cx neg, cont to monitor, off of antibiotics  Pt/ot-  recommended SNF   Case mngt on board , working on swing bed/ SNF  Morning labs stable     VTE prophylaxis: Lovenox 30mg     Pt's condition: stable     Anticipated discharge and Disposition:  [facility] once accepted.  Patient is medically cleared          Portions of this note dictated using EMR integrated voice recognition software, and may be subject to voice recognition errors not corrected at proofreading. Please contact writer for clarification if needed.   _____________________________________________________________________    Malnutrition Status:  Nutrition consulted. Most recent weight and BMI monitored-     Measurements:  Wt Readings from Last 1 Encounters:   01/18/25 84 kg (185 lb 3 oz)   Body mass index is 34.99 kg/m².    Patient has been screened and assessed by RD.    Malnutrition Type:  Context:    Level:      Malnutrition Characteristic Summary:       Interventions/Recommendations (treatment strategy):        Scheduled Med:   amLODIPine  5 mg Oral Daily    atorvastatin  40 mg Oral QHS    enoxparin  30 mg Subcutaneous Q24H (prophylaxis, 1700)    guaiFENesin  600 mg Oral BID    metoprolol tartrate  25 mg Oral BID    pantoprazole  40 mg Oral Daily      Continuous Infusions:     PRN Meds:    Current Facility-Administered Medications:     acetaminophen, 1,000 mg, Oral, Q6H PRN    albuterol, 1 puff, Inhalation, Q4H PRN    aluminum-magnesium hydroxide-simethicone, 30 mL, Oral, QID PRN    benzonatate, 100 mg, Oral, TID PRN    bisacodyL, 10 mg, Rectal, Daily PRN    dextrose 50%, 12.5 g, Intravenous, PRN    dextrose 50%, 12.5 g, Intravenous, PRN    dextrose 50%, 25 g, Intravenous, PRN    dextrose 50%, 25 g, Intravenous, PRN    glucagon (human recombinant), 1 mg, Intramuscular, PRN    glucagon (human recombinant), 1 mg, Intramuscular, PRN    glucose, 16 g, Oral, PRN    glucose, 16 g, Oral, PRN    glucose, 24 g, Oral, PRN    glucose, 24 g, Oral, PRN    insulin aspart U-100, 0-10 Units, Subcutaneous, QID  (AC + HS) PRN    melatonin, 6 mg, Oral, Nightly PRN    ondansetron, 4 mg, Intravenous, Q4H PRN    polyethylene glycol, 17 g, Oral, BID PRN    sodium chloride 0.9%, 10 mL, Intravenous, PRN     Radiology:  I have personally reviewed the following imaging and agree with the radiologist.     X-Ray Chest 1 View  Narrative: EXAMINATION:  XR CHEST 1 VIEW    CLINICAL HISTORY:  fever;    TECHNIQUE:  One view    COMPARISON:  October 6, 2022.    FINDINGS:  Cardiopericardial silhouette is within normal limits.  No acute dense focal or segmental consolidation, congestive process, pleural effusions or pneumothorax.  Impression: No acute cardiopulmonary process identified.    Electronically signed by: Terrance Acosta  Date:    01/17/2025  Time:    20:31  CT Cervical Spine Without Contrast  Narrative: EXAMINATION:  CT CERVICAL SPINE WITHOUT CONTRAST    CLINICAL HISTORY:  Polytrauma, blunt;    TECHNIQUE:  Helical acquisition through the cervical spine without IV contrast. Three plane reconstructions were made available for review.  mGycm. Automatic exposure control, adjustment of mA/kV or iterative reconstruction technique was used to reduce radiation.    COMPARISON:  None available.    FINDINGS:  No fractures identified. No subluxation. Craniocervical junction and C1-C2 relationship are normal. The odontoid is intact. There is limited evaluation of the soft tissues. No prevertebral soft tissue swelling. Ligamentous injury cannot be excluded with CT.  Moderate degenerative changes including prominent posterior osteophyte C4-C5.  Impression: No acute bony injury of the cervical spine.    Electronically signed by: Topher Nice  Date:    01/17/2025  Time:    18:30  CT Head Without Contrast  Narrative: EXAMINATION:  CT HEAD WITHOUT CONTRAST    CLINICAL HISTORY:  Mental status change, unknown cause;    TECHNIQUE:  CT imaging of the head performed from the skull base to the vertex without intravenous contrast.  DLP 1043 mGycm.  Automatic exposure control, adjustment of mA/kV or iterative reconstruction technique was used to reduce radiation.    COMPARISON:  25 November 2021    FINDINGS:  There is no acute cortical infarct, hemorrhage or mass lesion.  There is moderate patchy hypoattenuation in the cerebral white matter which is nonspecific but most commonly associated with chronic small vessel ischemic changes.  There is mild global atrophy.  There are dense vascular calcifications.    Visualized paranasal sinuses and mastoid air cells are clear. The calvarium is grossly intact  Impression: No acute intracranial findings.    Electronically signed by: Topher Nice  Date:    01/17/2025  Time:    18:27      Emery Kc MD  Department of Hospital Medicine   [facility]   01/22/2025

## 2025-01-22 NOTE — PLAN OF CARE
Problem: Adult Inpatient Plan of Care  Goal: Plan of Care Review  1/22/2025 1300 by Zahra Grant LPN  Outcome: Progressing  1/22/2025 1259 by Zahra Grant LPN  Outcome: Progressing  Goal: Patient-Specific Goal (Individualized)  1/22/2025 1300 by Zahra Grant LPN  Outcome: Progressing  1/22/2025 1259 by Zahra Grant LPN  Outcome: Progressing  Goal: Absence of Hospital-Acquired Illness or Injury  1/22/2025 1300 by Zahra Grant LPN  Outcome: Progressing  1/22/2025 1259 by Zahra Grant LPN  Outcome: Progressing  Goal: Optimal Comfort and Wellbeing  1/22/2025 1300 by Zahra Grant LPN  Outcome: Progressing  1/22/2025 1259 by Zahra Grant LPN  Outcome: Progressing  Goal: Readiness for Transition of Care  1/22/2025 1300 by Zahra Grant LPN  Outcome: Progressing  1/22/2025 1259 by Zahra Grant LPN  Outcome: Progressing     Problem: Skin Injury Risk Increased  Goal: Skin Health and Integrity  1/22/2025 1300 by Zahra Grant LPN  Outcome: Progressing  1/22/2025 1259 by Zahra Grant LPN  Outcome: Progressing     Problem: Diabetes Comorbidity  Goal: Blood Glucose Level Within Targeted Range  1/22/2025 1300 by Zahra Grant LPN  Outcome: Progressing  1/22/2025 1259 by Zahra Grant LPN  Outcome: Progressing     Problem: Infection  Goal: Absence of Infection Signs and Symptoms  1/22/2025 1300 by Zahra Grant LPN  Outcome: Progressing  1/22/2025 1259 by Zahra Grant LPN  Outcome: Progressing     Problem: Fall Injury Risk  Goal: Absence of Fall and Fall-Related Injury  1/22/2025 1300 by Zahra Grant LPN  Outcome: Progressing  1/22/2025 1259 by Zahra Grant LPN  Outcome: Progressing     Problem: Pain Acute  Goal: Optimal Pain Control and Function  1/22/2025 1300 by Zahra Grant LPN  Outcome: Progressing  1/22/2025 1259 by Zahra Grant LPN  Outcome: Progressing     Problem: Fatigue  Goal: Improved Activity Tolerance  1/22/2025 1300 by Zahra Grant LPN  Outcome:  Progressing  1/22/2025 1259 by Zahra Grant LPN  Outcome: Progressing

## 2025-01-23 VITALS
SYSTOLIC BLOOD PRESSURE: 113 MMHG | DIASTOLIC BLOOD PRESSURE: 65 MMHG | HEART RATE: 65 BPM | RESPIRATION RATE: 18 BRPM | HEIGHT: 61 IN | BODY MASS INDEX: 34.96 KG/M2 | WEIGHT: 185.19 LBS | TEMPERATURE: 98 F | OXYGEN SATURATION: 97 %

## 2025-01-23 PROBLEM — J10.1 INFLUENZA A: Status: ACTIVE | Noted: 2025-01-23

## 2025-01-23 LAB
POCT GLUCOSE: 101 MG/DL (ref 70–110)
POCT GLUCOSE: 136 MG/DL (ref 70–110)

## 2025-01-23 PROCEDURE — 97535 SELF CARE MNGMENT TRAINING: CPT

## 2025-01-23 PROCEDURE — 97116 GAIT TRAINING THERAPY: CPT

## 2025-01-23 PROCEDURE — 25000003 PHARM REV CODE 250: Performed by: INTERNAL MEDICINE

## 2025-01-23 RX ORDER — BENZONATATE 100 MG/1
100 CAPSULE ORAL 3 TIMES DAILY PRN
Qty: 20 CAPSULE | Refills: 0 | Status: SHIPPED | OUTPATIENT
Start: 2025-01-23

## 2025-01-23 RX ADMIN — PANTOPRAZOLE SODIUM 40 MG: 40 TABLET, DELAYED RELEASE ORAL at 08:01

## 2025-01-23 RX ADMIN — BENZONATATE 100 MG: 100 CAPSULE ORAL at 08:01

## 2025-01-23 RX ADMIN — METOPROLOL TARTRATE 25 MG: 25 TABLET, FILM COATED ORAL at 08:01

## 2025-01-23 RX ADMIN — AMLODIPINE BESYLATE 5 MG: 5 TABLET ORAL at 08:01

## 2025-01-23 RX ADMIN — GUAIFENESIN 600 MG: 600 TABLET, EXTENDED RELEASE ORAL at 08:01

## 2025-01-23 NOTE — PLAN OF CARE
Spoke to patient's daughter requesting wheelchair. Referral sent to [facility] gave follow up info for daughter to have delivered to house

## 2025-01-23 NOTE — PLAN OF CARE
Spoke to patient's daughter is ok with dc home with home health has used [facility] in the past and wants to use again. Will dc to daughter's house. Referral sent via [employer]

## 2025-01-23 NOTE — PT/OT/SLP PROGRESS
"Physical Therapy Treatment    Patient Name:  Tonie Tejada   MRN:  [id number]    Recommendations:     Discharge therapy intensity: Low Intensity Therapy   Discharge Equipment Recommendations: none  Barriers to discharge: Impaired mobility and Ongoing medical needs    Assessment:     Tonie Tejada is a 84 y.o. female admitted with a medical diagnosis of  acute encephalopathy, influenza A, weakness, falls.  She presents with the following impairments/functional limitations: weakness, impaired balance, decreased safety awareness, impaired endurance, impaired functional mobility, gait instability, decreased lower extremity function.    Today pt is SBA for bed mobility, CGA for transfers, and CGA for ambulating in room with RW with no instance of buckling, and [place] for ascending/descending 4" step. Daughter present and reports she will be taking the patient to her house which has 2 short steps to get from the living room. Pt will have 24/7 spv/assist at her dgtr's house. Now recommending low intensity therapy at d/c.    Rehab Prognosis: Good; patient would benefit from acute skilled PT services to address these deficits and reach maximum level of function.    Recent Surgery: * No surgery found *      Plan:     During this hospitalization, patient would benefit from acute PT services 5 x/week to address the identified rehab impairments via gait training, therapeutic activities, therapeutic exercises and progress toward the following goals:    Plan of Care Expires:  03/15/25    Subjective     Chief Complaint: none stated  Patient/Family Comments/goals: to return to PLOF  Pain/Comfort:  Pain Rating 1: 0/10      Objective:     Communicated with RNANAND prior to session.  Patient found HOB elevated with PureWick upon PT entry to room.     General Precautions: Standard, fall, droplet  Orthopedic Precautions: N/A  Braces: N/A  Respiratory Status: Room air  Blood Pressure: NT  Skin Integrity: Visible skin " "intact      Functional Mobility:  Bed Mobility:     Supine to Sit: stand by assistance  Sit to Supine: stand by assistance  Transfers:     Sit to Stand:  contact guard assistance with rolling walker  Gait: Pt amb 2 x 15' in room with RW and CGA, no instance of buckling  Balance: good  Stairs:  Pt ascended/descended 4" step with No Assistive Device with no handrails with Minimal Assistance and HHA.       Education:  Patient and daughter/s were provided with verbal education education regarding PT role/goals/POC, fall prevention, safety awareness, and discharge/DME recommendations.  Understanding was verbalized.     Patient left HOB elevated with all lines intact, call button in reach, and dgtr present    GOALS:   Multidisciplinary Problems       Physical Therapy Goals          Problem: Physical Therapy    Goal Priority Disciplines Outcome Interventions   Physical Therapy Goal     PT, PT/OT Progressing    Description: Goals to be met by: d/c     Patient will increase functional independence with mobility by performin. Supine to sit with Stand-by Assistance  2. Sit to supine with Stand-by Assistance  3. Sit to stand transfer with Stand-by Assistance  4. Bed to chair transfer with Stand-by Assistance using Rolling Walker  5. Gait  x 200 feet with Stand-by Assistance using Rolling Walker.                          Time Tracking:     PT Received On: 25  PT Start Time: 1306     PT Stop Time: 1330  PT Total Time (min): 24 min     Billable Minutes: Gait Training 24    Treatment Type: Treatment  PT/PTA: PT     Number of PTA visits since last PT visit: 2025  "

## 2025-01-23 NOTE — PLAN OF CARE
Problem: Adult Inpatient Plan of Care  Goal: Plan of Care Review  Outcome: Met  Goal: Patient-Specific Goal (Individualized)  Outcome: Met  Goal: Absence of Hospital-Acquired Illness or Injury  Outcome: Met  Goal: Optimal Comfort and Wellbeing  Outcome: Met  Goal: Readiness for Transition of Care  Outcome: Met     Problem: Skin Injury Risk Increased  Goal: Skin Health and Integrity  Outcome: Met     Problem: Diabetes Comorbidity  Goal: Blood Glucose Level Within Targeted Range  Outcome: Met     Problem: Infection  Goal: Absence of Infection Signs and Symptoms  Outcome: Met     Problem: Fall Injury Risk  Goal: Absence of Fall and Fall-Related Injury  Outcome: Met     Problem: Pain Acute  Goal: Optimal Pain Control and Function  Outcome: Met     Problem: Fatigue  Goal: Improved Activity Tolerance  Outcome: Met

## 2025-01-23 NOTE — PT/OT/SLP PROGRESS
Occupational Therapy   Treatment    Name: Tonie Tejada  MRN: [id number]    Recommendations:     Recommended therapy intensity at discharge: Low Intensity Therapy   Discharge Equipment Recommendations:  none  Barriers to discharge:  None    Assessment:     Tonie Tejada is a 84 y.o. female with a medical diagnosis of  influenza A, acute metabolic encephalopathy. Performance deficits affecting function are weakness, impaired endurance, impaired self care skills, impaired functional mobility, gait instability, impaired balance, decreased lower extremity function, decreased upper extremity function. She required SBA for bed mobility and CGA for functional mobility within room using rolling walker. Pt demonstrated improvement since initial evaluation; changing d/c rec to low intensity as pt will have 24-hour assist upon d/c.     Rehab Prognosis:  Good; patient would benefit from acute skilled OT services to address these deficits and reach maximum level of function.       Plan:     Patient to be seen 5 x/week to address the above listed problems via self-care/home management, therapeutic activities, therapeutic exercises  Plan of Care Expires: 02/20/25  Plan of Care Reviewed with: patient, daughter    Subjective     Pain/Comfort:  Pain Rating 1: 0/10    Objective:     Communicated with: RN prior to session.  Patient found HOB elevated with PureWick, peripheral IV upon OT entry to room.    General Precautions: Standard, droplet, fall    Orthopedic Precautions:N/A  Braces: N/A  Respiratory Status: Room air     Occupational Performance:     Bed Mobility:    Patient completed Scooting/Bridging with stand by assistance  Patient completed Supine to Sit with stand by assistance  Patient completed Sit to Supine with stand by assistance     Functional Mobility/Transfers:  Patient completed Sit <> Stand Transfer with contact guard assistance  with  rolling walker   Patient completed Toilet Transfer Step Transfer technique  with contact guard assistance with  rolling walker  Functional Mobility: pt ambulated within room with CGA using rolling walker; no LOB.     Activities of Daily Living:  Grooming: stand by assistance to brush teeth while standing at the sink.   Toileting: contact guard assistance to complete toilet t/f using RW.     Therapeutic Positioning    OT interventions performed during the course of today's session in an effort to prevent and/or reduce acquired pressure injuries:   Education was provided on benefits of and recommendations for therapeutic positioning    Skin assessment: all bony prominences were assessed    Findings:  Visible skin intact.     [facility] 6 Click ADL: 22    Patient Education:  Patient and daughter/s were provided with verbal education education regarding OT role/goals/POC, fall prevention, safety awareness, and Discharge/DME recommendations.  Understanding was verbalized.      Patient left HOB elevated with all lines intact, call button in reach, RN notified, and daughter present.    GOALS:   Multidisciplinary Problems       Occupational Therapy Goals          Problem: Occupational Therapy    Goal Priority Disciplines Outcome Interventions   Occupational Therapy Goal     OT, PT/OT Progressing    Description: LTG: Pt will perform basic ADLs and ADL transfers with Modified independence using LRAD by discharge.    STG: to be met by 2/20/25    Pt will complete grooming standing at sink with LRAD with SBA.  Pt will complete UB dressing with SBA.  Pt will complete LB dressing with SBA using LRAD.  Pt will complete toileting with SBA using LRAD.  Pt will complete functional mobility to/from toilet and toilet transfer with SBA using LRAD.                        Time Tracking:     OT Date of Treatment: 01/23/25  OT Start Time: 1306  OT Stop Time: 1330  OT Total Time (min): 24 min    Billable Minutes:Self Care/Home Management 24 minutes.     OT/PIERO: OT     Number of PIERO visits since last OT visit:  1    1/23/2025

## 2025-01-23 NOTE — DISCHARGE SUMMARY
[facility] Hospital Medicine Discharge Summary    Admit Date: 1/17/2025  Discharge Date and Time: 1/23/202512:37 PM  Admitting Physician: ABI Team  Discharging Physician: Jess Tate MD.  Primary Care Physician: Marcia Vaughan FNP  Consults: None    Discharge Diagnoses:  Acute metabolic encephalopathy on arrival - resolved   Influenza A   Generalized weakness, episode of fall  CKD   Mild macrocytic anemia, thrombocytopenia  HX: Obesity, HTN, HLD, history of TIA, T2 dm, chronic hep B, osteoarthritis    Hospital Course:   84-year-old woman who uses a walker at baseline for ambulation, past medical history of DM, HTN, HLD, history of TIA brought to ER by her family members after more lethargy, change in mental status, episode of fall and episodes of confusion.  Patient's family denies seizure activity, head injury.  Patient reportedly had similar symptoms in the past with UTI.  At presentation she was febrile 102.8, bradycardic, elevated blood pressure and was doing well on room air.  CT head, CT spine were unremarkable.  Chest x-ray showed no acute processes.  Lab work revealed chronic anemia, stable platelet count today she is mildly thrombocytopenic.  P.o. mild acidotic with elevated BUN and serum creatinine at admission.  She was tested positive for flu for which Tamiflu was added.  Troponin was within normal limits.  UA was unremarkable.  Blood cultures were sent.  Patient was admitted to hospital medicine service for further evaluation and management.      Patient continued on Tamiflu renal dose, completed the course, supportive care with neb treatments, antitussives.  She worked with physical therapy, initially recommended moderate intensity ,  consulted for placement then patient improved while awaiting to be placed.    Pt was seen and examined on the day of discharge, daughter at bedside, hemodynamically stable on room air, expressed her wish to go home.   Patient discharge to home with home health services.  Recommended follow up.    Vitals:  VITAL SIGNS: 24 HRS MIN & MAX LAST   Temp  Min: 98.1 °F (36.7 °C)  Max: 98.5 °F (36.9 °C) 98.2 °F (36.8 °C)   BP  Min: 113/65  Max: 144/83 113/65   Pulse  Min: 65  Max: 75  65   Resp  Min: 18  Max: 20 18   SpO2  Min: 96 %  Max: 98 % 97 %       Physical Exam:  General: In no acute distress, afebrile, laying in bed comfortably  Chest:  Unlabored breathing, on room air  Heart:  Normal rate  Abdomen: Soft, non tender  MSK: Warm  Neurologic:  Awake alert answering appropriately    Procedures Performed: No admission procedures for hospital encounter.     Significant Diagnostic Studies: See Full reports for all details    Recent Labs   Lab 01/18/25  0615 01/20/25  0659 01/21/25  0535   WBC 6.62 3.37* 3.98*   RBC 3.71* 3.37* 3.21*   HGB 11.5* 10.5* 9.9*   HCT 35.6* 32.1* 30.6*   MCV 96.0* 95.3* 95.3*   MCH 31.0 31.2* 30.8   MCHC 32.3* 32.7* 32.4*   RDW 12.3 12.2 12.3   * 103* 107*   MPV 12.6* 13.3* 13.1*       Recent Labs   Lab 01/17/25  1544 01/18/25  0615 01/20/25  0540 01/21/25  0535    142 134* 139   K 4.4 4.4 4.7 4.8   * 111* 107 110*   CO2 18* 20* 19* 20*   BUN 26.5* 28.9* 21.6* 21.6*   CREATININE 1.83* 1.80* 1.45* 1.39*   CALCIUM 9.2 8.9 8.1* 8.0*   MG  --  2.30  --   --    ALBUMIN 3.8 3.9  --   --    ALKPHOS 75 77  --   --    ALT 8 8  --   --    AST 15 23  --   --    BILITOT 0.6 0.7  --   --         Microbiology Results (last 7 days)       Procedure Component Value Units Date/Time    Blood culture #2 **CANNOT BE ORDERED STAT** [[id number]]  (Normal) Collected: 01/17/25 2116    Order Status: Completed Specimen: Blood Updated: 01/22/25 2300     Blood Culture No Growth at 5 days    Blood culture #1 **CANNOT BE ORDERED STAT** [[id number]]  (Normal) Collected: 01/17/25 2116    Order Status: Completed Specimen: Blood Updated: 01/22/25 2300     Blood Culture No Growth at 5 days             X-Ray Chest 1  View  Narrative: EXAMINATION:  XR CHEST 1 VIEW    CLINICAL HISTORY:  fever;    TECHNIQUE:  One view    COMPARISON:  October 6, 2022.    FINDINGS:  Cardiopericardial silhouette is within normal limits.  No acute dense focal or segmental consolidation, congestive process, pleural effusions or pneumothorax.  Impression: No acute cardiopulmonary process identified.    Electronically signed by: Terrance Acosta  Date:    01/17/2025  Time:    20:31  CT Cervical Spine Without Contrast  Narrative: EXAMINATION:  CT CERVICAL SPINE WITHOUT CONTRAST    CLINICAL HISTORY:  Polytrauma, blunt;    TECHNIQUE:  Helical acquisition through the cervical spine without IV contrast. Three plane reconstructions were made available for review.  mGycm. Automatic exposure control, adjustment of mA/kV or iterative reconstruction technique was used to reduce radiation.    COMPARISON:  None available.    FINDINGS:  No fractures identified. No subluxation. Craniocervical junction and C1-C2 relationship are normal. The odontoid is intact. There is limited evaluation of the soft tissues. No prevertebral soft tissue swelling. Ligamentous injury cannot be excluded with CT.  Moderate degenerative changes including prominent posterior osteophyte C4-C5.  Impression: No acute bony injury of the cervical spine.    Electronically signed by: Topher Nice  Date:    01/17/2025  Time:    18:30  CT Head Without Contrast  Narrative: EXAMINATION:  CT HEAD WITHOUT CONTRAST    CLINICAL HISTORY:  Mental status change, unknown cause;    TECHNIQUE:  CT imaging of the head performed from the skull base to the vertex without intravenous contrast.  DLP 1043 mGycm. Automatic exposure control, adjustment of mA/kV or iterative reconstruction technique was used to reduce radiation.    COMPARISON:  25 November 2021    FINDINGS:  There is no acute cortical infarct, hemorrhage or mass lesion.  There is moderate patchy hypoattenuation in the cerebral white matter which is  nonspecific but most commonly associated with chronic small vessel ischemic changes.  There is mild global atrophy.  There are dense vascular calcifications.    Visualized paranasal sinuses and mastoid air cells are clear. The calvarium is grossly intact  Impression: No acute intracranial findings.    Electronically signed by: Topher Nice  Date:    01/17/2025  Time:    18:27         Medication List        START taking these medications      benzonatate 100 MG capsule  Commonly known as: TESSALON  Take 1 capsule (100 mg total) by mouth 3 (three) times daily as needed for Cough.            CHANGE how you take these medications      amLODIPine 5 MG tablet  Commonly known as: NORVASC  What changed: Another medication with the same name was removed. Continue taking this medication, and follow the directions you see here.     metoprolol tartrate 25 MG tablet  Commonly known as: LOPRESSOR  What changed: Another medication with the same name was removed. Continue taking this medication, and follow the directions you see here.     pioglitazone 30 MG tablet  Commonly known as: ACTOS  What changed: Another medication with the same name was removed. Continue taking this medication, and follow the directions you see here.     simvastatin 20 MG tablet  Commonly known as: ZOCOR  What changed: Another medication with the same name was removed. Continue taking this medication, and follow the directions you see here.            CONTINUE taking these medications      albuterol 90 mcg/actuation inhaler  Commonly known as: PROVENTIL/VENTOLIN HFA     entecavir 0.5 MG Tab  Commonly known as: BARACLUDE  Take 1/2 (one-half) tablet by mouth once daily     hydrocortisone 2.5 % rectal cream  Commonly known as: ANUSOL-HC  Place rectally 2 (two) times daily. for 7 days     omeprazole 40 MG capsule  Commonly known as: PRILOSEC  Take 1 capsule (40 mg total) by mouth every morning.               Where to Get Your Medications        These  medications were sent to [id number] - [place] - [address]  [address] [address]      Phone: [phone]   benzonatate 100 MG capsule          Explained in detail to the patient about the discharge plan, medications, and follow-up visits. Pt understands and agrees with the treatment plan  Discharge Disposition: Home-Health Care [facility] [place]   Discharged Condition: stable  Diet-   Dietary Orders (From admission, onward)       Start     Ordered    01/18/25 0314  Diet diabetic Cardiac (Low Na/Chol); 2000 Calories (up to 75 gm per meal); Standard Tray  (Diet/Nutrition - [facility])  Diet effective now        Question Answer Comment   Diet Modifier: Cardiac (Low Na/Chol)    Total calories / carbs: 2000 Calories (up to 75 gm per meal)    Tray type: Standard Tray        01/18/25 0314                   Medications Per DC med rec  Activities as tolerated    For further questions contact hospitalist office    Discharge time 33 minutes    For worsening symptoms, chest pain, shortness of breath, increased abdominal pain, high grade fever, stroke or stroke like symptoms, immediately go to the nearest Emergency Room or call 911 as soon as possible.      Jess Card M.D on 1/23/2025. at 12:37 PM.

## 2025-01-24 LAB — HEMATOLOGIST REVIEW: NORMAL

## 2025-02-24 ENCOUNTER — OFFICE VISIT (OUTPATIENT)
Dept: INFECTIOUS DISEASES | Facility: CLINIC | Age: 85
End: 2025-02-24
Payer: MEDICARE

## 2025-02-24 VITALS
BODY MASS INDEX: 34.55 KG/M2 | HEIGHT: 61 IN | HEART RATE: 79 BPM | SYSTOLIC BLOOD PRESSURE: 156 MMHG | RESPIRATION RATE: 12 BRPM | WEIGHT: 183 LBS | TEMPERATURE: 98 F | DIASTOLIC BLOOD PRESSURE: 73 MMHG

## 2025-02-24 DIAGNOSIS — N18.32 CHRONIC KIDNEY DISEASE, STAGE 3B: ICD-10-CM

## 2025-02-24 DIAGNOSIS — B18.1 CHRONIC HEPATITIS B: Primary | ICD-10-CM

## 2025-02-24 LAB
25(OH)D3+25(OH)D2 SERPL-MCNC: 41 NG/ML (ref 30–80)
AFP-TM SERPL-MCNC: <2 NG/ML
ALBUMIN SERPL-MCNC: 3.8 G/DL (ref 3.4–4.8)
ALBUMIN/GLOB SERPL: 1 RATIO (ref 1.1–2)
ALP SERPL-CCNC: 72 UNIT/L (ref 40–150)
ALT SERPL-CCNC: 7 UNIT/L (ref 0–55)
ANION GAP SERPL CALC-SCNC: 10 MEQ/L
AST SERPL-CCNC: 13 UNIT/L (ref 5–34)
BASOPHILS # BLD AUTO: 0.04 X10(3)/MCL
BASOPHILS NFR BLD AUTO: 0.7 %
BILIRUB SERPL-MCNC: 0.7 MG/DL
BUN SERPL-MCNC: 19.6 MG/DL (ref 9.8–20.1)
CALCIUM SERPL-MCNC: 9.1 MG/DL (ref 8.4–10.2)
CHLORIDE SERPL-SCNC: 107 MMOL/L (ref 98–107)
CO2 SERPL-SCNC: 24 MMOL/L (ref 23–31)
CREAT SERPL-MCNC: 1.26 MG/DL (ref 0.55–1.02)
CREAT/UREA NIT SERPL: 16
EOSINOPHIL # BLD AUTO: 0.1 X10(3)/MCL (ref 0–0.9)
EOSINOPHIL NFR BLD AUTO: 1.7 %
ERYTHROCYTE [DISTWIDTH] IN BLOOD BY AUTOMATED COUNT: 13.2 % (ref 11.5–17)
GFR SERPLBLD CREATININE-BSD FMLA CKD-EPI: 42 ML/MIN/1.73/M2
GLOBULIN SER-MCNC: 3.9 GM/DL (ref 2.4–3.5)
GLUCOSE SERPL-MCNC: 101 MG/DL (ref 82–115)
HCT VFR BLD AUTO: 34.3 % (ref 37–47)
HGB BLD-MCNC: 11 G/DL (ref 12–16)
IMM GRANULOCYTES # BLD AUTO: 0.02 X10(3)/MCL (ref 0–0.04)
IMM GRANULOCYTES NFR BLD AUTO: 0.3 %
INR PPP: 1.1
LYMPHOCYTES # BLD AUTO: 1.37 X10(3)/MCL (ref 0.6–4.6)
LYMPHOCYTES NFR BLD AUTO: 23.7 %
MCH RBC QN AUTO: 31.3 PG (ref 27–31)
MCHC RBC AUTO-ENTMCNC: 32.1 G/DL (ref 33–36)
MCV RBC AUTO: 97.4 FL (ref 80–94)
MONOCYTES # BLD AUTO: 0.54 X10(3)/MCL (ref 0.1–1.3)
MONOCYTES NFR BLD AUTO: 9.3 %
NEUTROPHILS # BLD AUTO: 3.72 X10(3)/MCL (ref 2.1–9.2)
NEUTROPHILS NFR BLD AUTO: 64.3 %
NRBC BLD AUTO-RTO: 0 %
PLATELET # BLD AUTO: 149 X10(3)/MCL (ref 130–400)
PMV BLD AUTO: 13 FL (ref 7.4–10.4)
POTASSIUM SERPL-SCNC: 4.2 MMOL/L (ref 3.5–5.1)
PROT SERPL-MCNC: 7.7 GM/DL (ref 5.8–7.6)
PROTHROMBIN TIME: 14.1 SECONDS (ref 11.4–14)
RBC # BLD AUTO: 3.52 X10(6)/MCL (ref 4.2–5.4)
RHEUMATOID FACT SERPL-ACNC: <13 IU/ML
SODIUM SERPL-SCNC: 141 MMOL/L (ref 136–145)
WBC # BLD AUTO: 5.79 X10(3)/MCL (ref 4.5–11.5)

## 2025-02-24 PROCEDURE — 80053 COMPREHEN METABOLIC PANEL: CPT | Performed by: NURSE PRACTITIONER

## 2025-02-24 PROCEDURE — 1100F PTFALLS ASSESS-DOCD GE2>/YR: CPT | Mod: CPTII,,, | Performed by: NURSE PRACTITIONER

## 2025-02-24 PROCEDURE — 3078F DIAST BP <80 MM HG: CPT | Mod: CPTII,,, | Performed by: NURSE PRACTITIONER

## 2025-02-24 PROCEDURE — 99214 OFFICE O/P EST MOD 30 MIN: CPT | Mod: PBBFAC | Performed by: NURSE PRACTITIONER

## 2025-02-24 PROCEDURE — 85025 COMPLETE CBC W/AUTO DIFF WBC: CPT | Performed by: NURSE PRACTITIONER

## 2025-02-24 PROCEDURE — 86431 RHEUMATOID FACTOR QUANT: CPT | Mod: 59 | Performed by: NURSE PRACTITIONER

## 2025-02-24 PROCEDURE — 0003M LIVER DIS 10 ASSAYS W/NASH: CPT | Performed by: NURSE PRACTITIONER

## 2025-02-24 PROCEDURE — 1125F AMNT PAIN NOTED PAIN PRSNT: CPT | Mod: CPTII,,, | Performed by: NURSE PRACTITIONER

## 2025-02-24 PROCEDURE — 3077F SYST BP >= 140 MM HG: CPT | Mod: CPTII,,, | Performed by: NURSE PRACTITIONER

## 2025-02-24 PROCEDURE — 1160F RVW MEDS BY RX/DR IN RCRD: CPT | Mod: CPTII,,, | Performed by: NURSE PRACTITIONER

## 2025-02-24 PROCEDURE — 87517 HEPATITIS B DNA QUANT: CPT | Performed by: NURSE PRACTITIONER

## 2025-02-24 PROCEDURE — 86431 RHEUMATOID FACTOR QUANT: CPT | Performed by: NURSE PRACTITIONER

## 2025-02-24 PROCEDURE — 3288F FALL RISK ASSESSMENT DOCD: CPT | Mod: CPTII,,, | Performed by: NURSE PRACTITIONER

## 2025-02-24 PROCEDURE — 36415 COLL VENOUS BLD VENIPUNCTURE: CPT | Performed by: NURSE PRACTITIONER

## 2025-02-24 PROCEDURE — 82105 ALPHA-FETOPROTEIN SERUM: CPT | Performed by: NURSE PRACTITIONER

## 2025-02-24 PROCEDURE — 82306 VITAMIN D 25 HYDROXY: CPT | Performed by: NURSE PRACTITIONER

## 2025-02-24 PROCEDURE — 99215 OFFICE O/P EST HI 40 MIN: CPT | Mod: S$PBB,,, | Performed by: NURSE PRACTITIONER

## 2025-02-24 PROCEDURE — 1159F MED LIST DOCD IN RCRD: CPT | Mod: CPTII,,, | Performed by: NURSE PRACTITIONER

## 2025-02-24 PROCEDURE — 85610 PROTHROMBIN TIME: CPT | Performed by: NURSE PRACTITIONER

## 2025-02-24 RX ORDER — ENTECAVIR 0.5 MG/1
TABLET, FILM COATED ORAL
Qty: 45 TABLET | Refills: 1 | Status: SHIPPED | OUTPATIENT
Start: 2025-02-24

## 2025-02-24 RX ORDER — LANCETS 30 GAUGE
EACH MISCELLANEOUS
COMMUNITY
Start: 2024-10-07

## 2025-02-24 NOTE — PROGRESS NOTES
Abdominal U/S scheduled for 02/28/2025 at 11:30a.m. Arrive at 11:00a.m.,entrance #3, fast x 6 hours. Verbal and written instructions given.

## 2025-02-24 NOTE — PROGRESS NOTES
Patient ID: Tonie Tejada 84 y.o.     Chief Complaint:   Chief Complaint   Patient presents with    Followup Hep B     Denies problems        HPI:    2/24/25  Ms. Schilling is an 85 yo AAF here today for HBV f/u visit. She is taking entecavir 0.5mg 1/2 tab daily and has tolerated it well.  Has not collected labs since time of treatment revision, will collect today.  She denies jaundice, icterus, nausea, vomiting, dark urine, or jazz colored stools.  She was recently hospitalized with UTI, flu, & pneumonia Jan 2025. While in hospital, her  of 59 years passed away from complications of flu and pneumonia as well.  She and the family are still actively grieving. She is currently staying at [facility] [place], has not yet returned home since hospital stay. Prayers of comfort offered, voiced appreciation. Requests testing for RA, as well as vitamin D levels today. Orders placed. Renal function is stable. All questions answered & concerns addressed.    7/16/24  Ms. Schilling is an 82 yo AAF presenting today for HBV f/u visit.  She is taking Vemlidy daily and tolerates it well. However, she has experienced some renal decline over the past  year and treatment revision is most appropriate at this time. Labs 4/24 HBV UD, Creatinine 1.62, GFR 31.  Will repeat CMP today and revise treatment to entecavir 0.5mg tab, take 1/2 tab daily as her daughter is concerned that taking 1 tab every other day may lead to confusion & incorrect dosing. They will split pills with a pill splitter. RUQ abd u/s completed 5/16/24, mildly coarsened echotexture noted. She denies jaundice, icterus, nausea, vomiting, dark urine, or jazz colored stools. She is requesting refills on meds from PCP as office is closed and she is in need of refills today. Refills sent as requested. All questions answered & concerns addressed.     4/12/24  Ms. Schilling is an 82 yo AAF here today for HBV f/u visit, accompanied by her daughter Silvia. She tells me that she  takes Vemlidy daily, but Silvia states that she has been missing some doses of all of her meds due to sleeping too much. She stayed with Silvia for about a month or so after hospital discharge, has been home with spouse and son since . Labs  HBV ND, AFP <2.0,  Creatinine 1.66, GR 44, AST 14, ALT 8, INR 1.2.  Will update labs today and closely monitor renal function for any need to revise treatment plan. Last U/S , repeat ordered. Denies jaundice, icterus, nausea, vomiting, dark urine, or jazz colored stools.  All questions answered & concerns addressed.           Past Medical History:   Diagnosis Date    Chronic hepatitis B     DM (diabetes mellitus)     HLD (hyperlipidemia)     HTN (hypertension)     OA (osteoarthritis)     TIA (transient ischemic attack)         Past Surgical History:   Procedure Laterality Date     SECTION      COLONOSCOPY N/A 10/21/2023    Procedure: COLONOSCOPY;  Surgeon: Jasper Davis MD;  Location: [facility];  Service: Gastroenterology;  Laterality: N/A;    COLONOSCOPY, WITH POLYPECTOMY USING SNARE  10/21/2023    Procedure: COLONOSCOPY, WITH POLYPECTOMY USING SNARE;  Surgeon: Jasper Davis MD;  Location: [facility];  Service: Gastroenterology;;    DIGITAL RECTAL EXAMINATION UNDER ANESTHESIA N/A 2023    Procedure: EXAM UNDER ANESTHESIA, DIGITAL, RECTUM;  Surgeon: Jamie Garcia MD;  Location: [facility] OR;  Service: General;  Laterality: N/A;    ESOPHAGOGASTRODUODENOSCOPY N/A 10/20/2023    Procedure: EGD;  Surgeon: Cody Urbina MD;  Location: [facility] ENDOSCOPY;  Service: Gastroenterology;  Laterality: N/A;    EXAMINATION UNDER ANESTHESIA N/A 11/10/2023    Procedure: Exam under anesthesia;  Surgeon: Jamie Garcia MD;  Location: [facility];  Service: Colon and Rectal;  Laterality: N/A;  Prone jackknife    EXCISIONAL HEMORRHOIDECTOMY  11/10/2023    Procedure: HEMORRHOIDECTOMY;  Surgeon: Jamie Garcia MD;  Location: [facility];  Service:  Colon and Rectal;;    HYSTERECTOMY      INCISION OF PERIRECTAL ABSCESS  11/10/2023    Procedure: INCISION, ABSCESS, PERIRECTAL;  Surgeon: Jamie Garcia MD;  Location: [facility] OR;  Service: Colon and Rectal;;    JOINT REPLACEMENT          Social History     Socioeconomic History    Marital status:     Number of children: 5   Tobacco Use    Smoking status: Former    Smokeless tobacco: Never    Tobacco comments:     states a pack would last a week, quit 50 yrs ago   Substance and Sexual Activity    Alcohol use: Not Currently    Drug use: Never    Sexual activity: Not Currently     Partners: Male     Social Drivers of Health     Financial Resource Strain: Low Risk  (1/18/2025)    Overall Financial Resource Strain (CARDIA)     Difficulty of Paying Living Expenses: Not hard at all   Food Insecurity: No Food Insecurity (1/18/2025)    Hunger Vital Sign     Worried About Running Out of Food in the Last Year: Never true     Ran Out of Food in the Last Year: Never true   Transportation Needs: Patient Declined (1/18/2025)    TRANSPORTATION NEEDS     Transportation : Patient declined   Physical Activity: Inactive (11/17/2023)    Exercise Vital Sign     Days of Exercise per Week: 0 days     Minutes of Exercise per Session: 0 min   Stress: No Stress Concern Present (1/18/2025)    [employer] [place] [employer] - Occupational Stress Questionnaire     Feeling of Stress : Not at all   Housing Stability: Unknown (1/18/2025)    Housing Stability Vital Sign     Unable to Pay for Housing in the Last Year: No     Homeless in the Last Year: No        No family history on file.     Review of patient's allergies indicates:  No Known Allergies     Immunization History   Administered Date(s) Administered    COVID-19, MRNA, LN-S, PF (MODERNA FULL 0.5 ML DOSE) 07/23/2021, 12/14/2021, 12/14/2021    Hepatitis A, Adult 02/06/2017, 08/07/2017    Hepatitis B, Adult 10/16/2017    Influenza 11/10/2014    Influenza (FLUAD) -  "Quadrivalent - Adjuvanted - PF *Preferred* (65+) 01/31/2023    Influenza - Quadrivalent 03/19/2021    Influenza - Quadrivalent - High Dose - PF (65 years and older) 11/04/2021    Influenza - Quadrivalent - PF *Preferred* (6 months and older) 11/19/2004, 02/24/2010    Influenza - Trivalent - Afluria, Fluzone MDV 11/19/2004, 02/24/2010    Influenza - Trivalent - Fluarix, Flulaval, Fluzone, Afluria - PF 11/19/2004, 02/24/2010    Influenza - Trivalent - Fluzone High Dose - PF (65 years and older) 10/10/2016, 10/16/2017, 10/15/2018, 10/24/2019    Pneumococcal Conjugate - 13 Valent 02/12/2018    Pneumococcal Polysaccharide - 23 Valent 02/13/2019    Tdap 10/16/2017    Zoster Recombinant 01/31/2023, 09/06/2023        Review of Systems   Constitutional: Negative.    HENT: Negative.     Eyes: Negative.    Respiratory: Negative.     Cardiovascular: Negative.    Gastrointestinal: Negative.    Genitourinary: Negative.    Musculoskeletal:  Positive for joint pain.   Skin: Negative.    Neurological: Negative.    Endo/Heme/Allergies: Negative.    Psychiatric/Behavioral: Negative.     All other systems reviewed and are negative.         Objective:      BP (!) 156/73   Pulse 79   Temp 97.9 °F (36.6 °C) (Oral)   Resp 12   Ht 5' 1" (1.549 m)   Wt 83 kg (183 lb)   BMI 34.58 kg/m²      Physical Exam  Vitals reviewed.   Constitutional:       General: She is not in acute distress.     Appearance: Normal appearance. She is not toxic-appearing.   Eyes:      General: No scleral icterus.  Cardiovascular:      Rate and Rhythm: Normal rate and regular rhythm.      Heart sounds: Normal heart sounds.   Pulmonary:      Effort: Pulmonary effort is normal. No respiratory distress.      Breath sounds: Normal breath sounds.   Abdominal:      General: Bowel sounds are normal. There is no distension.      Palpations: Abdomen is soft. There is no mass.      Tenderness: There is no abdominal tenderness.   Musculoskeletal:      Right lower leg: Edema " present.      Left lower leg: Edema present.   Skin:     General: Skin is warm and dry.   Neurological:      Mental Status: She is alert and oriented to person, place, and time.          Labs:   Lab Results   Component Value Date    WBC 3.98 (L) 01/21/2025    HGB 9.9 (L) 01/21/2025    HCT 30.6 (L) 01/21/2025    MCV 95.3 (H) 01/21/2025     (L) 01/21/2025       CMP  Sodium   Date Value Ref Range Status   01/21/2025 139 136 - 145 mmol/L Final     Potassium   Date Value Ref Range Status   01/21/2025 4.8 3.5 - 5.1 mmol/L Final     Chloride   Date Value Ref Range Status   01/21/2025 110 (H) 98 - 107 mmol/L Final     CO2   Date Value Ref Range Status   01/21/2025 20 (L) 23 - 31 mmol/L Final     Blood Urea Nitrogen   Date Value Ref Range Status   01/21/2025 21.6 (H) 9.8 - 20.1 mg/dL Final     Creatinine   Date Value Ref Range Status   01/21/2025 1.39 (H) 0.55 - 1.02 mg/dL Final     Calcium   Date Value Ref Range Status   01/21/2025 8.0 (L) 8.4 - 10.2 mg/dL Final     Albumin   Date Value Ref Range Status   01/18/2025 3.9 3.4 - 4.8 g/dL Final     Bilirubin Total   Date Value Ref Range Status   01/18/2025 0.7 <=1.5 mg/dL Final     ALP   Date Value Ref Range Status   01/18/2025 77 40 - 150 unit/L Final     AST   Date Value Ref Range Status   01/18/2025 23 5 - 34 unit/L Final     ALT   Date Value Ref Range Status   01/18/2025 8 0 - 55 unit/L Final     eGFR   Date Value Ref Range Status   01/21/2025 37 mL/min/1.73/m2 Final     Lab Results   Component Value Date    TSH 0.801 01/17/2025     HIV   Date Value Ref Range Status   01/31/2023 Nonreactive Nonreactive Final     INR   Date Value Ref Range Status   04/12/2024 1.1 <=1.3 Final     Syphilis Antibody   Date Value Ref Range Status   01/31/2023 Reactive (A) Nonreactive, Equivocal Final     Hep C Ab Interp   Date Value Ref Range Status   01/31/2023 Nonreactive Nonreactive Final     Hep BsAg Conf   Date Value Ref Range Status   01/31/2023 Confirmed Positive  Final     Results  for orders placed or performed in visit on 01/31/23   Hepatitis B e Antigen   Result Value Ref Range    Hepatitis Be Ag, S Negative Negative     Results for orders placed or performed in visit on 01/31/23   Hepatitis B e antibody   Result Value Ref Range    HBe Antibody Positive (A) Negative     Results for orders placed or performed in visit on 04/12/24   Hepatitis B DNA, Quant   Result Value Ref Range    HBV DNA Detect/Quant Undetected Undetected IU/mL       Imaging: Reviewed most recent relevant imaging studies available, notable results highlighted in this note      Medications:     Current Outpatient Medications   Medication Instructions    albuterol (PROVENTIL/VENTOLIN HFA) 90 mcg/actuation inhaler 1 puff, Every 4 hours PRN    amLODIPine (NORVASC) 5 mg, Daily    benzonatate (TESSALON) 100 mg, Oral, 3 times daily PRN    entecavir (BARACLUDE) 0.5 MG Tab Take 1/2 (one-half) tablet by mouth once daily    hydrocortisone (ANUSOL-HC) 2.5 % rectal cream Rectal, 2 times daily    metoprolol tartrate (LOPRESSOR) 25 mg, 2 times daily    miscellaneous medical supply Kit Pill splitter    omeprazole (PRILOSEC) 40 mg, Oral, Every morning    ONETOUCH ULTRA2 METER Misc SMARTSIG:Via Meter Daily    pioglitazone (ACTOS) 30 mg, Daily    simvastatin (ZOCOR) 20 mg, Nightly       Assessment:       Problem List Items Addressed This Visit    None  Visit Diagnoses         Chronic hepatitis B    -  Primary    Relevant Medications    entecavir (BARACLUDE) 0.5 MG Tab    Other Relevant Orders    US Abdomen Limited    AFP Tumor Marker    Comprehensive Metabolic Panel    CBC Auto Differential    OLIVEIRA Fibrosure    HEPATITIS B VIRAL DNA, QUANTITATIVE    Protime-INR    Vitamin D    Rheumatoid Factor IgA    Rheumatoid Factor IgM    Rheumatoid Quantitative      Chronic kidney disease, stage 3b        Relevant Orders    Vitamin D               Plan:      Chronic hepatitis B  -     Cancel: AFP Tumor Marker; Future; Expected date: 02/24/2025  -      Cancel: Comprehensive Metabolic Panel; Future; Expected date: 02/24/2025  -     Cancel: CBC Auto Differential; Future; Expected date: 02/24/2025  -     Cancel: OLIVEIRA Fibrosure; Future; Expected date: 02/24/2025  -     Cancel: HEPATITIS B VIRAL DNA, QUANTITATIVE; Future; Expected date: 02/24/2025  -     US Abdomen Limited; Future; Expected date: 03/10/2025  -     Cancel: Protime-INR; Future; Expected date: 02/24/2025  -     entecavir (BARACLUDE) 0.5 MG Tab; Take 1/2 (one-half) tablet by mouth once daily  Dispense: 45 tablet; Refill: 1  -     Cancel: Vitamin D; Future; Expected date: 02/24/2025  -     Cancel: Rheumatoid Factor IgA; Future; Expected date: 02/24/2025  -     Cancel: Rheumatoid Factor IgM; Future; Expected date: 02/24/2025  -     Cancel: Rheumatoid Quantitative; Future; Expected date: 02/24/2025  -     AFP Tumor Marker; Future; Expected date: 02/24/2025  -     Comprehensive Metabolic Panel; Future; Expected date: 02/24/2025  -     CBC Auto Differential; Future; Expected date: 02/24/2025  -     OLIVEIRA Fibrosure; Future; Expected date: 02/24/2025  -     HEPATITIS B VIRAL DNA, QUANTITATIVE; Future; Expected date: 02/24/2025  -     Protime-INR; Future; Expected date: 02/24/2025  -     Vitamin D; Future; Expected date: 02/24/2025  -     Rheumatoid Factor IgA; Future; Expected date: 02/24/2025  -     Rheumatoid Factor IgM; Future; Expected date: 02/24/2025  -     Rheumatoid Quantitative; Future; Expected date: 02/24/2025  Blood precautions, do not share a razor, needle, toothbrush, or clippers with anyone.    Use condoms for sexual encounters.   Vaccination of all household members and close contacts strongly encouraged.  Avoid or minimize all alcohol intake.   Limit Tylenol use to 2 grams per day.   RUQ abdominal ultrasound every 6-12 months for liver cancer screening.   Lower fat diet, higher fiber diet.   Continue Entecavir 0.5 mg, take 1/2 tab daily. Use pill splitter for even split. Avoid treatment  interruptions which can lead to viral rebound and acute hepatitis infection.  Labs today as ordered.  FibroScan 11/21 S 2-3, F 0-1; 1/23 S1, F0-1.  RUQ abd u/s 5/16/24 Normal in size, measuring 12.2 cm. Questionable mildly coarse echotexture. No focal hepatic lesions. Normal hepatopetal portal venous flow.   Repeat U/S ordered.  RTC 6 months with Rhianna.    Chronic kidney disease, stage 3b  -     Cancel: Vitamin D; Future; Expected date: 02/24/2025  -     Vitamin D; Future; Expected date: 02/24/2025  Stable.   Continue Entecavir 0.5 mg 1/2 tab daily.     Other orders  -     miscellaneous medical supply Kit; Pill splitter  Dispense: 1 kit; Refill: 0               I spent a total of  46  minutes on the day of the visit.  This includes face to face time and non-face to face time preparing to see the patient (eg, review of tests), obtaining and/or reviewing separately obtained history, documenting clinical information in the electronic or other health record, independently interpreting results and communicating results to the patient/family/caregiver, or care coordinator.

## 2025-02-25 LAB
HBV DNA SERPL NAA+PROBE-ACNC: NOT DETECTED [IU]/ML
RHEUMATOID FACTOR IGA QUANTITATIVE (OLG): 3.1 IU/ML
RHEUMATOID FACTOR IGM QUANTITATIVE (OLG): 1.3 IU/ML

## 2025-02-27 LAB
A2 MACROGLOB SERPL-MCNC: 321 MG/DL (ref 100–280)
ALT SERPL W P-5'-P-CCNC: 10 U/L (ref 7–45)
ANNOTATION COMMENT IMP: ABNORMAL
APO A-I SERPL-MCNC: 109 MG/DL
AST SERPL W P-5'-P-CCNC: 17 U/L (ref 8–43)
BILIRUB SERPL-MCNC: 0.6 MG/DL (ref 0–1.2)
CHOLEST SERPL-MCNC: 172 MG/DL
FIBROSIS STAGE SERPL QL: ABNORMAL
GGT SERPL-CCNC: 14 U/L (ref 5–36)
GLUCOSE P FAST SERPL-MCNC: 106 MG/DL (ref 70–100)
HAPTOGLOB SERPL NEPH-MCNC: 207 MG/DL (ref 30–200)
LIVER FIBR SCORE SERPL CALC.FIBROSURE: 0.58
LIVER FIBROSIS INTERPRETATION SER-IMP: ABNORMAL
LIVER STEATOSIS GRADE SERPL QL: ABNORMAL
LIVER STEATOSIS INTERP SERPL-IMP: ABNORMAL
LIVER STEATOSIS SCORE SERPL: 0.48
NASH GRADE SERPL QL: ABNORMAL
NASH INTERPRETATION SERPL-IMP: ABNORMAL
NASH SCORE SERPL: 0.56
SERIAL #: ABNORMAL
TRIGL SERPL-MCNC: 127 MG/DL

## 2025-03-18 ENCOUNTER — HOSPITAL ENCOUNTER (OUTPATIENT)
Dept: RADIOLOGY | Facility: HOSPITAL | Age: 85
Discharge: HOME | End: 2025-03-18
Attending: NURSE PRACTITIONER
Payer: MEDICARE

## 2025-03-18 DIAGNOSIS — B18.1 CHRONIC HEPATITIS B: ICD-10-CM

## 2025-03-18 PROCEDURE — 76705 ECHO EXAM OF ABDOMEN: CPT | Mod: TC

## 2025-03-19 ENCOUNTER — RESULTS FOLLOW-UP (OUTPATIENT)
Dept: INFECTIOUS DISEASES | Facility: CLINIC | Age: 85
End: 2025-03-19

## 2025-03-19 NOTE — PROGRESS NOTES
Phoned patient's daughter, Silvia regarding Abd U/S results. No answer. Message left on voice mail to contact the clinic.

## 2025-03-20 NOTE — PROGRESS NOTES
Phoned patient's daughter, Silvia, as patient requests. Discussed RUQ abd u/s results reviewed. Stable, no new findings. Voiced understanding and appreciates call.

## 2025-03-20 NOTE — PROGRESS NOTES
[facility]inor[facility] Mc[facility] Infectious Disease Clinical Support Staff  Caller: Unspecified (Today,  9:07 AM)  Pt of Rhianna Girard's daughter Zahra called stating that she had a missed call from Mounika.      Zahra requesting a call back at [phone]    Phoned patient. No answer. Message left on voice mail to contact clinic.

## 2025-03-20 NOTE — PROGRESS NOTES
Phoned patient's daughter, Silvia regarding Abd U/S results. No answer. Message left on voice mail to contact the clinic.

## (undated) DEVICE — SOL IRRI STRL WATER 1000ML

## (undated) DEVICE — BLOCK BLOX BITE DENT RIM 54FR

## (undated) DEVICE — UNDERPAD DISPOSABLE 30X30IN

## (undated) DEVICE — PROTECTANT BENZOIN SPRAY ON 4.

## (undated) DEVICE — TIP SUCTION YANKAUER

## (undated) DEVICE — COLLECTION SPECIMEN NEPTUNE

## (undated) DEVICE — TAPE SILK 3IN

## (undated) DEVICE — Device

## (undated) DEVICE — ADAPTER DUAL NSL LUER M-M 7FT

## (undated) DEVICE — GLOVE PROTEXIS LTX MICRO  7.5

## (undated) DEVICE — NDL 18GA X1 1/2 REG BEVEL

## (undated) DEVICE — KIT CANIST SUCTION 1200CC

## (undated) DEVICE — SOL BETADINE 5%

## (undated) DEVICE — BLADE SURG STAINLESS STEEL #15

## (undated) DEVICE — SYR 10CC LUER LOCK

## (undated) DEVICE — TRAY SKIN SCRUB WET PREMIUM

## (undated) DEVICE — GAUZE SPONGE 4X4 12PLY

## (undated) DEVICE — KIT SURGICAL COLON .25 1.1OZ

## (undated) DEVICE — KIT SURGICAL TURNOVER

## (undated) DEVICE — TRAY CATH FOL SIL URIMTR 16FR

## (undated) DEVICE — TAPE ADH MEDIPORE 4 X 10YDS

## (undated) DEVICE — ELECTRODE PATIENT RETURN DISP

## (undated) DEVICE — SUT VICRYL CTD 2-0 GI 27 SH

## (undated) DEVICE — TUBING O2 FEMALE CONN 13FT

## (undated) DEVICE — NDL HYPO REG 25G X 1 1/2

## (undated) DEVICE — GLOVE PROTEXIS BLUE LATEX 8